# Patient Record
Sex: FEMALE | Race: WHITE | Employment: OTHER | URBAN - METROPOLITAN AREA
[De-identification: names, ages, dates, MRNs, and addresses within clinical notes are randomized per-mention and may not be internally consistent; named-entity substitution may affect disease eponyms.]

---

## 2017-02-15 RX ORDER — AMLODIPINE BESYLATE 5 MG/1
5 TABLET ORAL DAILY
Qty: 30 TABLET | Refills: 6 | Status: SHIPPED | OUTPATIENT
Start: 2017-02-15 | End: 2019-07-08

## 2017-03-14 ENCOUNTER — OFFICE VISIT (OUTPATIENT)
Dept: CARDIOLOGY CLINIC | Age: 77
End: 2017-03-14

## 2017-03-14 VITALS
BODY MASS INDEX: 40.3 KG/M2 | SYSTOLIC BLOOD PRESSURE: 134 MMHG | OXYGEN SATURATION: 96 % | DIASTOLIC BLOOD PRESSURE: 84 MMHG | HEART RATE: 73 BPM | HEIGHT: 62 IN | WEIGHT: 219 LBS

## 2017-03-14 DIAGNOSIS — E78.5 HYPERLIPIDEMIA, UNSPECIFIED HYPERLIPIDEMIA TYPE: ICD-10-CM

## 2017-03-14 DIAGNOSIS — I10 ESSENTIAL HYPERTENSION: ICD-10-CM

## 2017-03-14 PROCEDURE — 99214 OFFICE O/P EST MOD 30 MIN: CPT | Performed by: INTERNAL MEDICINE

## 2017-03-14 PROCEDURE — 3017F COLORECTAL CA SCREEN DOC REV: CPT | Performed by: INTERNAL MEDICINE

## 2017-03-14 PROCEDURE — 1036F TOBACCO NON-USER: CPT | Performed by: INTERNAL MEDICINE

## 2017-03-14 PROCEDURE — G8484 FLU IMMUNIZE NO ADMIN: HCPCS | Performed by: INTERNAL MEDICINE

## 2017-03-14 PROCEDURE — G8419 CALC BMI OUT NRM PARAM NOF/U: HCPCS | Performed by: INTERNAL MEDICINE

## 2017-03-14 PROCEDURE — 1123F ACP DISCUSS/DSCN MKR DOCD: CPT | Performed by: INTERNAL MEDICINE

## 2017-03-14 PROCEDURE — G8427 DOCREV CUR MEDS BY ELIG CLIN: HCPCS | Performed by: INTERNAL MEDICINE

## 2017-03-14 PROCEDURE — G8400 PT W/DXA NO RESULTS DOC: HCPCS | Performed by: INTERNAL MEDICINE

## 2017-03-14 PROCEDURE — 3014F SCREEN MAMMO DOC REV: CPT | Performed by: INTERNAL MEDICINE

## 2017-03-14 PROCEDURE — 1090F PRES/ABSN URINE INCON ASSESS: CPT | Performed by: INTERNAL MEDICINE

## 2017-03-14 PROCEDURE — 4040F PNEUMOC VAC/ADMIN/RCVD: CPT | Performed by: INTERNAL MEDICINE

## 2017-03-14 RX ORDER — ATORVASTATIN CALCIUM 10 MG/1
10 TABLET, FILM COATED ORAL DAILY
Qty: 30 TABLET | Refills: 6 | Status: SHIPPED | OUTPATIENT
Start: 2017-03-14 | End: 2019-11-20 | Stop reason: SDUPTHER

## 2017-04-03 ENCOUNTER — HOSPITAL ENCOUNTER (OUTPATIENT)
Dept: GENERAL RADIOLOGY | Age: 77
Discharge: HOME | End: 2017-04-03
Attending: INTERNAL MEDICINE | Admitting: INTERNAL MEDICINE

## 2017-04-03 LAB
CHOLESTEROL: 170 MG/DL
HDLC SERPL-MCNC: 69 MG/DL
LDL CHOLESTEROL CALCULATED: 76 MG/DL
TRIGL SERPL-MCNC: 127 MG/DL

## 2017-06-28 RX ORDER — METOPROLOL SUCCINATE 25 MG/1
25 TABLET, EXTENDED RELEASE ORAL DAILY
Qty: 90 TABLET | Refills: 3 | Status: SHIPPED | OUTPATIENT
Start: 2017-06-28 | End: 2019-07-08

## 2017-10-24 ENCOUNTER — OFFICE VISIT (OUTPATIENT)
Dept: CARDIOLOGY CLINIC | Age: 77
End: 2017-10-24

## 2017-10-24 VITALS
HEIGHT: 64 IN | BODY MASS INDEX: 36.95 KG/M2 | SYSTOLIC BLOOD PRESSURE: 152 MMHG | WEIGHT: 216.4 LBS | DIASTOLIC BLOOD PRESSURE: 96 MMHG | HEART RATE: 70 BPM

## 2017-10-24 DIAGNOSIS — Z82.49 FH: CAD (CORONARY ARTERY DISEASE): ICD-10-CM

## 2017-10-24 DIAGNOSIS — E78.49 OTHER HYPERLIPIDEMIA: ICD-10-CM

## 2017-10-24 DIAGNOSIS — I10 ESSENTIAL HYPERTENSION: Primary | ICD-10-CM

## 2017-10-24 PROCEDURE — 1090F PRES/ABSN URINE INCON ASSESS: CPT | Performed by: INTERNAL MEDICINE

## 2017-10-24 PROCEDURE — G8400 PT W/DXA NO RESULTS DOC: HCPCS | Performed by: INTERNAL MEDICINE

## 2017-10-24 PROCEDURE — G8484 FLU IMMUNIZE NO ADMIN: HCPCS | Performed by: INTERNAL MEDICINE

## 2017-10-24 PROCEDURE — 99213 OFFICE O/P EST LOW 20 MIN: CPT | Performed by: INTERNAL MEDICINE

## 2017-10-24 PROCEDURE — 1036F TOBACCO NON-USER: CPT | Performed by: INTERNAL MEDICINE

## 2017-10-24 PROCEDURE — 4040F PNEUMOC VAC/ADMIN/RCVD: CPT | Performed by: INTERNAL MEDICINE

## 2017-10-24 PROCEDURE — 1123F ACP DISCUSS/DSCN MKR DOCD: CPT | Performed by: INTERNAL MEDICINE

## 2017-10-24 PROCEDURE — 3017F COLORECTAL CA SCREEN DOC REV: CPT | Performed by: INTERNAL MEDICINE

## 2017-10-24 PROCEDURE — G8417 CALC BMI ABV UP PARAM F/U: HCPCS | Performed by: INTERNAL MEDICINE

## 2017-10-24 PROCEDURE — G8427 DOCREV CUR MEDS BY ELIG CLIN: HCPCS | Performed by: INTERNAL MEDICINE

## 2017-10-24 RX ORDER — HYDROCHLOROTHIAZIDE 25 MG/1
25 TABLET ORAL DAILY
Qty: 30 TABLET | Refills: 3 | Status: SHIPPED | OUTPATIENT
Start: 2017-10-24 | End: 2019-07-08

## 2017-10-24 NOTE — PROGRESS NOTES
Patient's medical history is documented as below. Patient confirms taking her medication as prescribed. Labs and recent testing results have been reviewed. She is here for 6 month follow-up  Patient has hypertension hyperlipidemia and strong family history of coronary artery disease. She has problems taking Norvasc and that has been discontinued. Her blood pressure is elevated today. Start the patient on hydrochlorothiazide 25 mg daily. Monitor blood pressure. Check labs in 2 weeks including complete metabolic panel and lipid panel. ROS    Constitutional:  Denies fever, chills, weight loss or weakness. HENT:  Denies sore throat or ear pain. Respiratory:  Denies cough or shortness of breath. Cardiovascular:  Denies chest pain, palpitations or swelling. GI:  Denies abdominal pain, nausea, vomiting, or diarrhea. Musculoskeletal:  Denies back pain. Skin:  Denies rash. Neurologic:  Denies headache, focal weakness or sensory changes. Endocrine:  Denies polyuria or polydipsia. Lymphatic:  Denies swollen glands. All other systems in a complete (14 organ system) ROS, except for pertinent positives and/or negatives as noted in the HPI (or elsewhere in my note), have been reviewed and are negative. PAST MEDICAL HISTORY    Past Medical History:   Diagnosis Date    Asthma     Bronchitis, asthmatic     Chest pain     Atypical    Fibromyalgia     H/O cardiac catheterization 08/02/1991    Patent coronary aterties with preserved left ventricular funciton.  H/O cardiovascular stress test     10/12 Normal EF70%,11/17/09 (Dominick) Normal pattern of perfusion in all regions. Post stress left venticle is normal in size. Normal Study. Normal perfusion in the distribution of all coronaries. Normal LV size and funciton. Rest EF is 70%. Global left ventricular systolic function is normal. Exercise capacity 7 METS.  Exercise capacity is normal. 6/98, 6/99, 8/07,     H/O complete electrocardiogram     9/1/05 NSR, occ. PVC. 3/14/05, 7/26/02, 7/2/97, 6/5/98, 3/16/98, 2/2/90    H/O echocardiogram     11/17/09 2D, M-mode, Doppler and color flow Doppler. Left ventricle is normal size. Normal left ventricular wall thickness. Left ventricular systolic function is normal.  EF =>55%. Transmitral spectral Doppler flow pattern is suggestive of impaired LV relaxation. Left ventricular wass motion is normal.  6/98, 5/19/05,    Hyperlipidemia     Hypertension     Macular degeneration        MEDICATIONS    Current Outpatient Rx   Medication Sig Dispense Refill    hydrochlorothiazide (HYDRODIURIL) 25 MG tablet Take 1 tablet by mouth daily 30 tablet 3    metoprolol succinate (TOPROL XL) 25 MG extended release tablet Take 1 tablet by mouth daily 90 tablet 3    atorvastatin (LIPITOR) 10 MG tablet Take 1 tablet by mouth daily Pt. Is taking every 3 days 30 tablet 6    ibuprofen (ADVIL;MOTRIN) 200 MG tablet Take 200 mg by mouth as needed for Pain      aspirin 81 MG tablet Take 81 mg by mouth as needed for Pain (Every other day)       Multiple Vitamins-Minerals (ICAPS) CAPS Take  by mouth.  Lutein 6 MG TABS Take  by mouth daily.  Calcium Carbonate-Vitamin D (CALTRATE 600+D PO) Take  by mouth daily.         amLODIPine (NORVASC) 5 MG tablet Take 1 tablet by mouth daily 30 tablet 6       ALLERGIES    Allergies   Allergen Reactions    Latex     Cozaar [Losartan] Swelling    Amlodipine Besylate Swelling     Muscle pains, swelling, cough    Codeine     Contrast [Iodides]     Iodine     Levaquin [Levofloxacin]     Losartan Potassium     Macrodantin [Nitrofurantoin Macrocrystal]     Nutritional Supplements     Penicillins     Wasp Venom     Zocor [Simvastatin - High Dose]        FAMILY HISTORY    Family History   Problem Relation Age of Onset    Cancer Mother     Coronary Art Dis Mother     Other Mother      CABG    Coronary Art Dis Father     Dementia Father     Other Father      CHF, CABG    Stroke Father     Cancer Brother        SURGICAL HISTORY    Past Surgical History:   Procedure Laterality Date    BLADDER REMOVAL      1973, 67    BREAST BIOPSY      1971, 72, 76, 76, 78 and 1997, benign    CARPAL TUNNEL RELEASE      Right    CATARACT REMOVAL  03/03/2017    Left Eye    FOOT SURGERY  8/09    Left    HYSTERECTOMY      KNEE SURGERY  12/10    Left    OTHER SURGICAL HISTORY      Bone Spurs 1990, 1993       PHYSICAL EXAM    Vital Signs:  BP (!) 152/96   Pulse 70   Ht 5' 4\" (1.626 m)   Wt 216 lb 6.4 oz (98.2 kg)   BMI 37.14 kg/m²   Constitutional:  Well developed, well nourished, no acute distress, non-toxic appearance. HENT:  Normocephalic, atraumatic, bilateral external ears normal, oropharynx moist, no oral exudates, Nose normal. Neck- normal range of motion, no tenderness, supple, no stridor. Eyes:  PERRL, EOMI, conjunctiva normal, no discharge. Respiratory:  Lungs are clear to auscultation  Cardiovascular:  Regular rhythm no murmur rub or gallop  GI:  Bowel sounds normal, Soft, no tenderness, no masses, no pulsatile masses. Integument:  Warm, dry, no erythema, no rash. Back:  No tenderness, no CVA tenderness. Musculoskeletal:  Intact distal pulses, no edema, no tenderness, no cyanosis, no clubbing. Good range of motion in all major joints. No tenderness to palpation or major deformities noted. Back- No tenderness. Neurologic:  Alert & oriented x 3, normal motor function, normal sensory function, no focal deficits noted. Psychiatric:  Affect normal, judgment normal, mood normal.     AssessmenT    1. Essential hypertension Not controlled   2. Other hyperlipidemia    3.  FH: CAD (coronary artery disease)        Plan  Start hydrochlorothiazide 25 mg daily  Monitor blood pressure  Check complete metabolic panel and lipid panel in 2 weeks  Office visit in 6 months or as needed    Electronically signed by: Natacha , 10/24/2017 10:39 AM

## 2017-11-15 ENCOUNTER — HOSPITAL ENCOUNTER (OUTPATIENT)
Dept: GENERAL RADIOLOGY | Age: 77
Discharge: HOME | End: 2017-11-15
Attending: INTERNAL MEDICINE | Admitting: INTERNAL MEDICINE

## 2017-11-15 LAB
ALBUMIN SERPL-MCNC: 4.1 GM/DL (ref 3.4–5)
ALP BLD-CCNC: 86 IU/L (ref 40–128)
ALT SERPL-CCNC: 20 U/L (ref 10–40)
ANION GAP SERPL CALCULATED.3IONS-SCNC: 10 MMOL/L (ref 4–16)
AST SERPL-CCNC: 17 IU/L (ref 15–37)
BILIRUB SERPL-MCNC: 0.4 MG/DL (ref 0–1)
BUN BLDV-MCNC: 20 MG/DL (ref 6–23)
CALCIUM SERPL-MCNC: 8.9 MG/DL (ref 8.3–10.6)
CHLORIDE BLD-SCNC: 103 MMOL/L (ref 99–110)
CHOLESTEROL: 171 MG/DL
CO2: 29 MMOL/L (ref 21–32)
CREAT SERPL-MCNC: 0.6 MG/DL (ref 0.6–1.1)
GFR AFRICAN AMERICAN: >60 ML/MIN/1.73M2
GFR NON-AFRICAN AMERICAN: >60 ML/MIN/1.73M2
GLUCOSE FASTING: 89 MG/DL (ref 70–99)
HCT VFR BLD CALC: 45.4 % (ref 37–47)
HDLC SERPL-MCNC: 66 MG/DL
HEMOGLOBIN: 14.6 GM/DL (ref 12.5–16)
LDL CHOLESTEROL DIRECT: 93 MG/DL
MCH RBC QN AUTO: 29.7 PG (ref 27–31)
MCHC RBC AUTO-ENTMCNC: 32.2 % (ref 32–36)
MCV RBC AUTO: 92.3 FL (ref 78–100)
PDW BLD-RTO: 13.6 % (ref 11.7–14.9)
PLATELET # BLD: 202 K/CU MM (ref 140–440)
PMV BLD AUTO: 12.7 FL (ref 7.5–11.1)
POTASSIUM SERPL-SCNC: 4.7 MMOL/L (ref 3.5–5.1)
RBC # BLD: 4.92 M/CU MM (ref 4.2–5.4)
SODIUM BLD-SCNC: 142 MMOL/L (ref 135–145)
TOTAL PROTEIN: 6.7 GM/DL (ref 6.4–8.2)
TRIGL SERPL-MCNC: 116 MG/DL
WBC # BLD: 7.1 K/CU MM (ref 4–10.5)

## 2017-12-18 ENCOUNTER — HOSPITAL ENCOUNTER (OUTPATIENT)
Dept: GENERAL RADIOLOGY | Age: 77
Discharge: HOME | End: 2017-12-18
Attending: OBSTETRICS & GYNECOLOGY | Admitting: OBSTETRICS & GYNECOLOGY

## 2017-12-19 LAB — CA 125: 16

## 2019-05-09 ENCOUNTER — HOSPITAL ENCOUNTER (OUTPATIENT)
Age: 79
Discharge: HOME | End: 2019-05-09
Payer: MEDICARE

## 2019-05-09 ENCOUNTER — HOSPITAL ENCOUNTER (OUTPATIENT)
Dept: GENERAL RADIOLOGY | Age: 79
Discharge: HOME | End: 2019-05-09
Payer: MEDICARE

## 2019-05-09 DIAGNOSIS — R05.9 COUGH: ICD-10-CM

## 2019-05-09 LAB
ALBUMIN SERPL-MCNC: 4.2 G/DL
ALP BLD-CCNC: 86 U/L
ALT SERPL-CCNC: 19 U/L
ANION GAP SERPL CALCULATED.3IONS-SCNC: NORMAL MMOL/L
AST SERPL-CCNC: 16 U/L
BASOPHILS ABSOLUTE: 0 /ΜL
BASOPHILS RELATIVE PERCENT: 0.3 %
BILIRUB SERPL-MCNC: 0.5 MG/DL (ref 0.1–1.4)
BUN BLDV-MCNC: 21 MG/DL
CALCIUM SERPL-MCNC: 9.9 MG/DL
CHLORIDE BLD-SCNC: 104 MMOL/L
CHOLESTEROL, TOTAL: 164 MG/DL
CHOLESTEROL/HDL RATIO: NORMAL
CO2: 27 MMOL/L
CREAT SERPL-MCNC: 0.8 MG/DL
EOSINOPHILS ABSOLUTE: 0.2 /ΜL
EOSINOPHILS RELATIVE PERCENT: 3.1 %
GFR CALCULATED: NORMAL
GLUCOSE BLD-MCNC: 108 MG/DL
HCT VFR BLD CALC: 47.1 % (ref 36–46)
HDLC SERPL-MCNC: 60 MG/DL (ref 35–70)
HEMOGLOBIN: 15.4 G/DL (ref 12–16)
LDL CHOLESTEROL CALCULATED: 84 MG/DL (ref 0–160)
LYMPHOCYTES ABSOLUTE: 1.2 /ΜL
LYMPHOCYTES RELATIVE PERCENT: 20.5 %
MCH RBC QN AUTO: 29.8 PG
MCHC RBC AUTO-ENTMCNC: 32.6 G/DL
MCV RBC AUTO: 91.4 FL
MONOCYTES ABSOLUTE: 0.5 /ΜL
MONOCYTES RELATIVE PERCENT: 9.1 %
NEUTROPHILS ABSOLUTE: 3.8 /ΜL
NEUTROPHILS RELATIVE PERCENT: 67 %
PLATELET # BLD: 191 K/ΜL
PMV BLD AUTO: ABNORMAL FL
POTASSIUM SERPL-SCNC: 5.1 MMOL/L
RBC # BLD: 5.15 10^6/ΜL
SODIUM BLD-SCNC: 143 MMOL/L
TOTAL PROTEIN: 6.6
TRIGL SERPL-MCNC: 100 MG/DL
TSH SERPL DL<=0.05 MIU/L-ACNC: 1.76 UIU/ML
VLDLC SERPL CALC-MCNC: 20 MG/DL
WBC # BLD: 5.7 10^3/ML

## 2019-05-09 PROCEDURE — 71046 X-RAY EXAM CHEST 2 VIEWS: CPT

## 2019-07-08 ENCOUNTER — OFFICE VISIT (OUTPATIENT)
Dept: CARDIOLOGY CLINIC | Age: 79
End: 2019-07-08
Payer: MEDICARE

## 2019-07-08 VITALS
DIASTOLIC BLOOD PRESSURE: 82 MMHG | HEART RATE: 88 BPM | WEIGHT: 218.8 LBS | BODY MASS INDEX: 37.36 KG/M2 | HEIGHT: 64 IN | SYSTOLIC BLOOD PRESSURE: 138 MMHG

## 2019-07-08 DIAGNOSIS — R06.02 SOB (SHORTNESS OF BREATH): Primary | ICD-10-CM

## 2019-07-08 PROCEDURE — 1036F TOBACCO NON-USER: CPT | Performed by: INTERNAL MEDICINE

## 2019-07-08 PROCEDURE — G8419 CALC BMI OUT NRM PARAM NOF/U: HCPCS | Performed by: INTERNAL MEDICINE

## 2019-07-08 PROCEDURE — 1123F ACP DISCUSS/DSCN MKR DOCD: CPT | Performed by: INTERNAL MEDICINE

## 2019-07-08 PROCEDURE — 99214 OFFICE O/P EST MOD 30 MIN: CPT | Performed by: INTERNAL MEDICINE

## 2019-07-08 PROCEDURE — 93000 ELECTROCARDIOGRAM COMPLETE: CPT | Performed by: INTERNAL MEDICINE

## 2019-07-08 PROCEDURE — G8400 PT W/DXA NO RESULTS DOC: HCPCS | Performed by: INTERNAL MEDICINE

## 2019-07-08 PROCEDURE — 1090F PRES/ABSN URINE INCON ASSESS: CPT | Performed by: INTERNAL MEDICINE

## 2019-07-08 PROCEDURE — G8427 DOCREV CUR MEDS BY ELIG CLIN: HCPCS | Performed by: INTERNAL MEDICINE

## 2019-07-08 PROCEDURE — 4040F PNEUMOC VAC/ADMIN/RCVD: CPT | Performed by: INTERNAL MEDICINE

## 2019-07-08 RX ORDER — TRIAMTERENE AND HYDROCHLOROTHIAZIDE 37.5; 25 MG/1; MG/1
1 TABLET ORAL DAILY
COMMUNITY
End: 2021-03-29

## 2019-07-08 RX ORDER — NEBIVOLOL 5 MG/1
5 TABLET ORAL 2 TIMES DAILY
COMMUNITY
End: 2019-07-08

## 2019-07-08 RX ORDER — LABETALOL 100 MG/1
100 TABLET, FILM COATED ORAL 2 TIMES DAILY
Qty: 60 TABLET | Refills: 3 | Status: SHIPPED | OUTPATIENT
Start: 2019-07-08 | End: 2019-09-09 | Stop reason: SDUPTHER

## 2019-07-08 NOTE — PROGRESS NOTES
Ursula Benjamin MD        OFFICE  FOLLOWUP NOTE    Chief complaints: patient is here for management of shortness of breath, HTN, DYSLPIDEMIA    Subjective: + shortness of breath, no dizziness, no palpitations    HPI Ralph Garcia is a 70 y. o.year old who  has a past medical history of Asthma, Bronchitis, asthmatic, Chest pain, Fibromyalgia, H/O cardiac catheterization, H/O cardiovascular stress test, H/O complete electrocardiogram, H/O echocardiogram, Hyperlipidemia, Hypertension, and Macular degeneration. and presents for management of shortness of breath, HTN, DYSLPIDEMIA which are well controlled  She has condo on [address] and climbing upto 3rd floor makes her shortness of breath, she thinks her blood pressure is labile also, her home blood pressure ranges from 117-149/77-89    Current Outpatient Medications   Medication Sig Dispense Refill    nebivolol (BYSTOLIC) 5 MG tablet Take 5 mg by mouth 2 times daily      triamterene-hydrochlorothiazide (MAXZIDE-25) 37.5-25 MG per tablet Take 1 tablet by mouth daily Indications: 1 every 3 days      atorvastatin (LIPITOR) 10 MG tablet Take 1 tablet by mouth daily Pt. Is taking every 3 days 30 tablet 6    ibuprofen (ADVIL;MOTRIN) 200 MG tablet Take 200 mg by mouth as needed for Pain      Multiple Vitamins-Minerals (ICAPS) CAPS Take  by mouth.  Calcium Carbonate-Vitamin D (CALTRATE 600+D PO) Take  by mouth daily. No current facility-administered medications for this visit. Allergies: Latex; Cozaar [losartan]; Amlodipine besylate; Codeine; Contrast [iodides]; Iodine; Levaquin [levofloxacin]; Losartan potassium; Macrodantin [nitrofurantoin macrocrystal]; Nutritional supplements; Penicillins;  Wasp venom; and Zocor [simvastatin - high dose]  Past Medical History:   Diagnosis Date    Asthma     Bronchitis, asthmatic     Chest pain     Atypical    Fibromyalgia     H/O cardiac catheterization 08/02/1991    Patent coronary aterties with preserved left ventricular funciton.  H/O cardiovascular stress test     10/12 Normal EF70%,11/17/09 (Dominick) Normal pattern of perfusion in all regions. Post stress left venticle is normal in size. Normal Study. Normal perfusion in the distribution of all coronaries. Normal LV size and funciton. Rest EF is 70%. Global left ventricular systolic function is normal. Exercise capacity 7 METS. Exercise capacity is normal. 6/98, 6/99, 8/07,     H/O complete electrocardiogram     9/1/05 NSR, occ. PVC. 3/14/05, 7/26/02, 7/2/97, 6/5/98, 3/16/98, 2/2/90    H/O echocardiogram     11/17/09 2D, M-mode, Doppler and color flow Doppler. Left ventricle is normal size. Normal left ventricular wall thickness. Left ventricular systolic function is normal.  EF =>55%. Transmitral spectral Doppler flow pattern is suggestive of impaired LV relaxation. Left ventricular wass motion is normal.  6/98, 5/19/05,    Hyperlipidemia     Hypertension     Macular degeneration      Past Surgical History:   Procedure Laterality Date    BLADDER REMOVAL      1973, 1974    BREAST BIOPSY      1971, 72, 76, 76, 78 and 1997, benign    CARPAL TUNNEL RELEASE      Right    CATARACT REMOVAL  03/03/2017    Left Eye    FOOT SURGERY  8/09    Left    HYSTERECTOMY      KNEE SURGERY  12/10    Left    OTHER SURGICAL HISTORY      Bone Spurs 1990, 1993     Family History   Problem Relation Age of Onset    Cancer Mother     Coronary Art Dis Mother     Other Mother         CABG    Coronary Art Dis Father     Dementia Father     Other Father         CHF, CABG    Stroke Father     Cancer Brother      Social History     Tobacco Use    Smoking status: Never Smoker    Smokeless tobacco: Never Used   Substance Use Topics    Alcohol use:  Yes     Alcohol/week: 0.6 oz     Types: 1 Glasses of wine per week     Comment: Rarely alcohol, 1 decaf coffee daily      [unfilled]  Review of Systems:   · Constitutional: No Fever or Weight Loss   · Eyes: No Decreased Vision  · ENT: No Headaches, Hearing Loss or Vertigo  · Cardiovascular: No chest pain,+ dyspnea on exertion, palpitations or loss of consciousness  · Respiratory: No cough or wheezing    · Gastrointestinal: No abdominal pain, appetite loss, blood in stools, constipation, diarrhea or heartburn  · Genitourinary: No dysuria, trouble voiding, or hematuria  · Musculoskeletal:  No gait disturbance, weakness or joint complaints  · Integumentary: No rash or pruritis  · Neurological: No TIA or stroke symptoms  · Psychiatric: No anxiety or depression  · Endocrine: No malaise, fatigue or temperature intolerance  · Hematologic/Lymphatic: No bleeding problems, blood clots or swollen lymph nodes  · Allergic/Immunologic: No nasal congestion or hives  All systems negative except as marked. Objective:  /82   Pulse 88   Ht 5' 4\" (1.626 m)   Wt 218 lb 12.8 oz (99.2 kg)   BMI 37.56 kg/m²   Wt Readings from Last 3 Encounters:   07/08/19 218 lb 12.8 oz (99.2 kg)   10/24/17 216 lb 6.4 oz (98.2 kg)   05/09/17 217 lb (98.4 kg)     Body mass index is 37.56 kg/m². GENERAL - Alert, oriented, pleasant, in no apparent distress,normal grooming  HEENT - pupils are reactive to light and accomodation, cornea intact, conjunctive normal color, ears are normal in exam,throat exam in normal, teeth, gum and palate are normal, oral mucosa is normal without any notation of pallor or cyanosis  Neck - Supple. No jugular venous distention noted. No carotid bruits, no apical -carotid delay  Respiratory:  Normal breath sounds, No respiratory distress, No wheezing, No chest tenderness. ,no use of accessory muscles, diaphragm movement is normal  Cardiovascular: (PMI) apex non displaced,no lifts no thrills, no s3,no s4, Normal heart rate, Normal rhythm, No murmurs, No rubs, No gallops.  Carotid arteries pulse and amplitude are normal no bruit, no abdominal bruit noted ( normal abdominal aorta ausculation), femoral arteries pulse and amplitude are

## 2019-07-11 ENCOUNTER — PROCEDURE VISIT (OUTPATIENT)
Dept: CARDIOLOGY CLINIC | Age: 79
End: 2019-07-11
Payer: MEDICARE

## 2019-07-11 DIAGNOSIS — M79.89 LEG SWELLING: Primary | ICD-10-CM

## 2019-07-11 DIAGNOSIS — R06.02 SOB (SHORTNESS OF BREATH): ICD-10-CM

## 2019-07-11 DIAGNOSIS — R06.02 SOB (SHORTNESS OF BREATH): Primary | ICD-10-CM

## 2019-07-11 LAB
LV EF: 48 %
LV EF: 72 %
LVEF MODALITY: NORMAL
LVEF MODALITY: NORMAL

## 2019-07-11 PROCEDURE — 93018 CV STRESS TEST I&R ONLY: CPT | Performed by: INTERNAL MEDICINE

## 2019-07-11 PROCEDURE — 93017 CV STRESS TEST TRACING ONLY: CPT | Performed by: INTERNAL MEDICINE

## 2019-07-11 PROCEDURE — 93016 CV STRESS TEST SUPVJ ONLY: CPT | Performed by: INTERNAL MEDICINE

## 2019-07-11 PROCEDURE — 78452 HT MUSCLE IMAGE SPECT MULT: CPT | Performed by: INTERNAL MEDICINE

## 2019-07-11 PROCEDURE — 93970 EXTREMITY STUDY: CPT | Performed by: INTERNAL MEDICINE

## 2019-07-11 PROCEDURE — 93306 TTE W/DOPPLER COMPLETE: CPT | Performed by: INTERNAL MEDICINE

## 2019-07-11 PROCEDURE — A9500 TC99M SESTAMIBI: HCPCS | Performed by: INTERNAL MEDICINE

## 2019-07-15 ENCOUNTER — TELEPHONE (OUTPATIENT)
Dept: CARDIOLOGY CLINIC | Age: 79
End: 2019-07-15

## 2019-07-15 NOTE — TELEPHONE ENCOUNTER
Notified pt of testing , she said Dr. Lowell Gibbons already told her to go to [facility] Pharmacy for Compression socks. Summary        No evidence of DVT or SVT in the bilateral common femoral vein, femoral    vein, popliteal vein, greater saphenous vein or small saphenous vein.    Significant reflux noted of the Right CFV.    No significant reflux noted in the veins of the left lower extremity.        Recommendations        compression socks and leg elevation                  Summary   Left ventricular systolic function is mildly depressed with an ejection   fraction of 45-50%.    Mild septal wall asymmetrical left ventricular hypertrophy.   The left atrium is mildly dilated.   Sclerotic, but non-stenotic aortic valve.   Doppler evaluation reveals moderate aortic and mitral regurgitation and mild   tricuspid regurgitation.   Right ventricular systolic pressure of 43 mmHg consistent with mild   pulmonary hypertension.   No evidence of pericardial effusion.       Summary    Supervising physician Dr. Mercy Mo .    Normal LV function.   Petr Huh is normal isotope uptake following exercise and at rest. There is no    evidence of exercise induced ischemia.    This is a normal study

## 2019-08-19 ENCOUNTER — OFFICE VISIT (OUTPATIENT)
Dept: CARDIOLOGY CLINIC | Age: 79
End: 2019-08-19
Payer: MEDICARE

## 2019-08-19 VITALS
SYSTOLIC BLOOD PRESSURE: 130 MMHG | BODY MASS INDEX: 37.36 KG/M2 | HEART RATE: 76 BPM | DIASTOLIC BLOOD PRESSURE: 82 MMHG | WEIGHT: 218.8 LBS | HEIGHT: 64 IN

## 2019-08-19 DIAGNOSIS — M79.89 LEG SWELLING: Primary | ICD-10-CM

## 2019-08-19 PROCEDURE — G8427 DOCREV CUR MEDS BY ELIG CLIN: HCPCS | Performed by: INTERNAL MEDICINE

## 2019-08-19 PROCEDURE — G8417 CALC BMI ABV UP PARAM F/U: HCPCS | Performed by: INTERNAL MEDICINE

## 2019-08-19 PROCEDURE — 1036F TOBACCO NON-USER: CPT | Performed by: INTERNAL MEDICINE

## 2019-08-19 PROCEDURE — 1090F PRES/ABSN URINE INCON ASSESS: CPT | Performed by: INTERNAL MEDICINE

## 2019-08-19 PROCEDURE — 4040F PNEUMOC VAC/ADMIN/RCVD: CPT | Performed by: INTERNAL MEDICINE

## 2019-08-19 PROCEDURE — G8400 PT W/DXA NO RESULTS DOC: HCPCS | Performed by: INTERNAL MEDICINE

## 2019-08-19 PROCEDURE — 1123F ACP DISCUSS/DSCN MKR DOCD: CPT | Performed by: INTERNAL MEDICINE

## 2019-08-19 PROCEDURE — 99214 OFFICE O/P EST MOD 30 MIN: CPT | Performed by: INTERNAL MEDICINE

## 2019-08-19 NOTE — PROGRESS NOTES
respiratory distress, No wheezing, No chest tenderness. ,no use of accessory muscles, diaphragm movement is normal  Cardiovascular: (PMI) apex non displaced,no lifts no thrills, no s3,no s4, Normal heart rate, Normal rhythm, No murmurs, No rubs, No gallops. Carotid arteries pulse and amplitude are normal no bruit, no abdominal bruit noted ( normal abdominal aorta ausculation), femoral arteries pulse and amplitude are normal no bruit, pedal pulses are normal  Femoral pulses have normal amplitude, no bruits   Extremities + significant edema, no varicose veins    Abdomen - No masses, tenderness, or organomegaly, no hepato-splenomegally, no bruits  Musculoskeletal - No significant edema, no kyphosis or scoliosis, no deformity in any extremity noted, muscle strength and tone are normal  Skin: no ulcer,no scar,no stasis dermatitis   Neurologic - alert oriented times 3,Cranial nerves II through XII are grossly intact. There were no gross focal neurologic abnormalities. All sensory and motor nerves examined and were normal  Psychiatric: normal mood and affect    No results found for: CKTOTAL, CKMB, CKMBINDEX, TROPONINI  BNP:  No results found for: BNP  PT/INR:  No results found for: PTINR  No results found for: LABA1C  Lab Results   Component Value Date    CHOL 171 11/15/2017    TRIG 116 11/15/2017    HDL 66 11/15/2017    LDLCALC 76 04/03/2017    LDLDIRECT 93 11/15/2017     Lab Results   Component Value Date    ALT 20 11/15/2017    AST 17 11/15/2017     TSH:  No results found for: TSH    Impression:  Abad Kaye is a 70 y. o.year old who  has a past medical history of Asthma, Bronchitis, asthmatic, Chest pain, Fibromyalgia, H/O cardiac catheterization, H/O cardiovascular stress test, H/O cardiovascular stress test, H/O complete electrocardiogram, H/O Doppler lower venous ultrasound, H/O echocardiogram, H/O echocardiogram, Hyperlipidemia, Hypertension, and Macular degeneration. and presents with     Plan:  1.  Shortness of breath:resolved since her blood pressure is controlled, on labetalol, has nicole stress test and echo   2. HTN: stable, home blood pressure reading noted, continue  labetalol   3. Leg swelling\" venous doppler showed severe rt CFV reflux, will recommend CT abdomen pelvis to check if there is any venous compression in the pelvis  1. Dyslipidemia:continue statins  2. Health maintenance: exerise and diet  All labs, medications and tests reviewed, continue all other medications of all above medical condition listed as is.     [unfilled]

## 2019-09-10 RX ORDER — LABETALOL 100 MG/1
100 TABLET, FILM COATED ORAL 2 TIMES DAILY
Qty: 180 TABLET | Refills: 3 | Status: SHIPPED | OUTPATIENT
Start: 2019-09-10 | End: 2020-05-22 | Stop reason: SDUPTHER

## 2019-11-20 ENCOUNTER — OFFICE VISIT (OUTPATIENT)
Dept: CARDIOLOGY CLINIC | Age: 79
End: 2019-11-20
Payer: MEDICARE

## 2019-11-20 VITALS
SYSTOLIC BLOOD PRESSURE: 136 MMHG | DIASTOLIC BLOOD PRESSURE: 84 MMHG | HEIGHT: 64 IN | WEIGHT: 216 LBS | HEART RATE: 82 BPM | BODY MASS INDEX: 36.88 KG/M2

## 2019-11-20 DIAGNOSIS — E78.2 MIXED HYPERLIPIDEMIA: Primary | ICD-10-CM

## 2019-11-20 PROCEDURE — G8417 CALC BMI ABV UP PARAM F/U: HCPCS | Performed by: INTERNAL MEDICINE

## 2019-11-20 PROCEDURE — G8400 PT W/DXA NO RESULTS DOC: HCPCS | Performed by: INTERNAL MEDICINE

## 2019-11-20 PROCEDURE — G8484 FLU IMMUNIZE NO ADMIN: HCPCS | Performed by: INTERNAL MEDICINE

## 2019-11-20 PROCEDURE — 99214 OFFICE O/P EST MOD 30 MIN: CPT | Performed by: INTERNAL MEDICINE

## 2019-11-20 PROCEDURE — 1123F ACP DISCUSS/DSCN MKR DOCD: CPT | Performed by: INTERNAL MEDICINE

## 2019-11-20 PROCEDURE — 1036F TOBACCO NON-USER: CPT | Performed by: INTERNAL MEDICINE

## 2019-11-20 PROCEDURE — 4040F PNEUMOC VAC/ADMIN/RCVD: CPT | Performed by: INTERNAL MEDICINE

## 2019-11-20 PROCEDURE — G8427 DOCREV CUR MEDS BY ELIG CLIN: HCPCS | Performed by: INTERNAL MEDICINE

## 2019-11-20 PROCEDURE — 1090F PRES/ABSN URINE INCON ASSESS: CPT | Performed by: INTERNAL MEDICINE

## 2019-11-20 RX ORDER — ATORVASTATIN CALCIUM 10 MG/1
10 TABLET, FILM COATED ORAL DAILY
Qty: 90 TABLET | Refills: 1 | Status: SHIPPED | OUTPATIENT
Start: 2019-11-20 | End: 2020-05-22 | Stop reason: SDUPTHER

## 2020-01-31 LAB
ALBUMIN SERPL-MCNC: NORMAL G/DL
ALP BLD-CCNC: 84 U/L
ALT SERPL-CCNC: 31 U/L
ANION GAP SERPL CALCULATED.3IONS-SCNC: 7 MMOL/L
AST SERPL-CCNC: 14 U/L
BASOPHILS ABSOLUTE: ABNORMAL
BASOPHILS RELATIVE PERCENT: 1.2 %
BILIRUB SERPL-MCNC: 0.4 MG/DL (ref 0.1–1.4)
BUN BLDV-MCNC: 21 MG/DL
CALCIUM SERPL-MCNC: 9.2 MG/DL
CHLORIDE BLD-SCNC: 107 MMOL/L
CO2: 27 MMOL/L
CREAT SERPL-MCNC: 0.83 MG/DL
EOSINOPHILS ABSOLUTE: ABNORMAL
EOSINOPHILS RELATIVE PERCENT: 1.6 %
GFR CALCULATED: 60
GLUCOSE BLD-MCNC: 84 MG/DL
HCT VFR BLD CALC: 47.4 % (ref 36–46)
HEMOGLOBIN: 15.6 G/DL (ref 12–16)
INR BLD: 0.91
LYMPHOCYTES ABSOLUTE: ABNORMAL
LYMPHOCYTES RELATIVE PERCENT: 21.4 %
MCH RBC QN AUTO: 30.2 PG
MCHC RBC AUTO-ENTMCNC: 33 G/DL
MCV RBC AUTO: 91.5 FL
MONOCYTES ABSOLUTE: ABNORMAL
MONOCYTES RELATIVE PERCENT: 8.6 %
NEUTROPHILS ABSOLUTE: ABNORMAL
NEUTROPHILS RELATIVE PERCENT: 67.2 %
PLATELET # BLD: 200 K/ΜL
PMV BLD AUTO: ABNORMAL FL
POTASSIUM SERPL-SCNC: 4.5 MMOL/L
PROTIME: 10.6 SECONDS
RBC # BLD: 5.17 10^6/ΜL
SODIUM BLD-SCNC: 141 MMOL/L
T4 FREE: 1.19
TOTAL PROTEIN: NORMAL
TROPONIN: 0.01
TSH SERPL DL<=0.05 MIU/L-ACNC: 1.88 UIU/ML
WBC # BLD: 7.3 10^3/ML

## 2020-02-11 ENCOUNTER — OFFICE VISIT (OUTPATIENT)
Dept: CARDIOLOGY CLINIC | Age: 80
End: 2020-02-11
Payer: MEDICARE

## 2020-02-11 ENCOUNTER — HOSPITAL ENCOUNTER (OUTPATIENT)
Age: 80
Discharge: HOME | End: 2020-02-11
Payer: MEDICARE

## 2020-02-11 VITALS
DIASTOLIC BLOOD PRESSURE: 88 MMHG | HEIGHT: 64 IN | HEART RATE: 84 BPM | SYSTOLIC BLOOD PRESSURE: 138 MMHG | WEIGHT: 211.4 LBS | BODY MASS INDEX: 36.09 KG/M2

## 2020-02-11 PROBLEM — I48.91 A-FIB (HCC): Status: ACTIVE | Noted: 2020-02-11

## 2020-02-11 LAB — TSH HIGH SENSITIVITY: 2.39 UIU/ML (ref 0.27–4.2)

## 2020-02-11 PROCEDURE — 1090F PRES/ABSN URINE INCON ASSESS: CPT | Performed by: INTERNAL MEDICINE

## 2020-02-11 PROCEDURE — 36415 COLL VENOUS BLD VENIPUNCTURE: CPT

## 2020-02-11 PROCEDURE — 1036F TOBACCO NON-USER: CPT | Performed by: INTERNAL MEDICINE

## 2020-02-11 PROCEDURE — 4040F PNEUMOC VAC/ADMIN/RCVD: CPT | Performed by: INTERNAL MEDICINE

## 2020-02-11 PROCEDURE — G8427 DOCREV CUR MEDS BY ELIG CLIN: HCPCS | Performed by: INTERNAL MEDICINE

## 2020-02-11 PROCEDURE — 1123F ACP DISCUSS/DSCN MKR DOCD: CPT | Performed by: INTERNAL MEDICINE

## 2020-02-11 PROCEDURE — G8400 PT W/DXA NO RESULTS DOC: HCPCS | Performed by: INTERNAL MEDICINE

## 2020-02-11 PROCEDURE — 93000 ELECTROCARDIOGRAM COMPLETE: CPT | Performed by: INTERNAL MEDICINE

## 2020-02-11 PROCEDURE — 84443 ASSAY THYROID STIM HORMONE: CPT

## 2020-02-11 PROCEDURE — G8417 CALC BMI ABV UP PARAM F/U: HCPCS | Performed by: INTERNAL MEDICINE

## 2020-02-11 PROCEDURE — 99214 OFFICE O/P EST MOD 30 MIN: CPT | Performed by: INTERNAL MEDICINE

## 2020-02-11 PROCEDURE — G8484 FLU IMMUNIZE NO ADMIN: HCPCS | Performed by: INTERNAL MEDICINE

## 2020-02-11 RX ORDER — ASPIRIN 325 MG
325 TABLET, DELAYED RELEASE (ENTERIC COATED) ORAL EVERY 6 HOURS PRN
Qty: 30 TABLET | Refills: 3 | Status: SHIPPED | OUTPATIENT
Start: 2020-02-11 | End: 2020-02-27

## 2020-02-11 NOTE — PROGRESS NOTES
Metoprolol; Nutritional supplements; Penicillins; Wasp venom; and Zocor [simvastatin - high dose]    Patient History:  Past Medical History:   Diagnosis Date    Asthma     Bronchitis, asthmatic     Chest pain     Atypical    Fibromyalgia     H/O cardiac catheterization 08/02/1991    Patent coronary aterties with preserved left ventricular funciton.  H/O cardiovascular stress test     10/12 Normal EF70%,11/17/09 (Dominick) Normal pattern of perfusion in all regions. Post stress left venticle is normal in size. Normal Study. Normal perfusion in the distribution of all coronaries. Normal LV size and funciton. Rest EF is 70%. Global left ventricular systolic function is normal. Exercise capacity 7 METS. Exercise capacity is normal. 6/98, 6/99, 8/07,     H/O cardiovascular stress test 07/11/2019    Normal study.  H/O complete electrocardiogram     9/1/05 NSR, occ. PVC. 3/14/05, 7/26/02, 7/2/97, 6/5/98, 3/16/98, 2/2/90    H/O Doppler lower venous ultrasound 07/11/2019     Significant reflux noted of the Right CFV. No DVT.  H/O echocardiogram     11/17/09 2D, M-mode, Doppler and color flow Doppler. Left ventricle is normal size. Normal left ventricular wall thickness. Left ventricular systolic function is normal.  EF =>55%. Transmitral spectral Doppler flow pattern is suggestive of impaired LV relaxation. Left ventricular wass motion is normal.  6/98, 5/19/05,    H/O echocardiogram 07/11/2019    EF 45-50%, Mild AR, TR, MR & PHTN.     Hyperlipidemia     Hypertension     Macular degeneration      Past Surgical History:   Procedure Laterality Date    BLADDER REMOVAL      1973, 1974    BREAST BIOPSY      1971, 72, 76, 76, 78 and 1997, benign    CARPAL TUNNEL RELEASE      Right    CATARACT REMOVAL  03/03/2017    Left Eye    FOOT SURGERY  8/09    Left    HYSTERECTOMY      KNEE SURGERY  12/10    Left    OTHER SURGICAL HISTORY      Bone Spurs 1990, 1993     Family History   Problem Relation Age of Onset    Conjunctiva normal, No discharge. Respiratory:  Normal breath sounds, No respiratory distress, No wheezing, No chest tenderness. ,no use of accessory muscles,   Cardiovascular: (PMI) apex non displaced,no lifts no thrills,S1 and S2 audible, No added heart sounds, No signs of ankle edema, or volume overload, No evidence of JVD  GI:  Bowel sounds normal, Soft, No tenderness, No masses, No gross visceromegaly   :  No costovertebral angle tenderness   Musculoskeletal:  No edema, no tenderness, no deformities. Back- no tenderness  Integument:  Well hydrated, no rash   Lymphatic:  No lymphadenopathy noted   Neurologic:  Alert & oriented x 3, CN 2-12 normal, normal motor function, normal sensory function, no focal deficits noted   Psychiatric:  Speech and behavior appropriate       Medical decision making and Data review:  DATA:    Lab Results   Component Value Date    CHOL 164 05/09/2019    TRIG 100 05/09/2019    HDL 60 05/09/2019    LDLCALC 84 05/09/2019    LDLDIRECT 93 11/15/2017     Lab Results   Component Value Date    ALT 19 05/09/2019    AST 16 05/09/2019     Stress test  7/11/19  Summary    Supervising physician Dr. Rajani Beck .    Normal LV function.   Kristin Beto is normal isotope uptake following exercise and at rest. There is no    evidence of exercise induced ischemia.    This is a normal study.         Echo 7/11/19  Summary   Left ventricular systolic function is mildly depressed with an ejection   fraction of 45-50%. Mild septal wall asymmetrical left ventricular hypertrophy. The left atrium is mildly dilated. Sclerotic, but non-stenotic aortic valve. Doppler evaluation reveals moderate aortic and mitral regurgitation and mild   tricuspid regurgitation. Right ventricular systolic pressure of 43 mmHg consistent with mild   pulmonary hypertension. No evidence of pericardial effusion.              All labs, medications and tests reviewed by myself including data and history from outside source , patient and available family . Assessment & Plan:    1. Atrial fibrillation, unspecified type ([address]    2. FH: CAD (coronary artery disease)    3. Essential hypertension    4. Other hyperlipidemia    5. Paroxysmal atrial fibrillation (HCC)         No problem-specific Assessment & Plan notes found for this encounter. Atrial fibrillation:  He is back in sinus rhythm today she is intolerant of multiple medications she has 15 allergy lists for now we will hold off on anticoagulation but she should be evaluated by gastroenterology due to concerns for GI bleeding she may have hemorrhoids though  Will refer to gastroenterology. Will hold off on adding antiarrhythmics right now she should be on aspirin full dose 33 to 5 mg daily once we are sure that she does not have GI bleeding we can consider anticoagulation or even refer her for watchman's device    HTN (hypertension)  Blood pressure very well controlled at home. At home it is 135 / 60's . Tolerating meds well. She has noted some left leg swelling after she goes to [employer]. No swelling now. She is not short of breath . EF is 40 to 45% stress showed no infarct or ischemia     Hyperlipidemia  She takes lipitor every three days     Dyslipidemia :  Lorena Meléndez had lab work recently,  Lipid profile was reviewed with patient. Counseled extensively and medication compliance urged. We discussed that for the  prevention of ASCVD our  goal is aggressive risk modification. Patient is encouraged to exercise even a brisk walk for 30 minutes  at least 3 to 4 times a week   Various goals were discussed and questions answered. Continue current medications. Appropriate prescriptions are addressed and refills ordered. Questions answered and patient verbalizes understanding. Call for any problems, questions, or concerns. Continue all other medications of all above medical condition listed as is. Return in about 3 months (around 5/11/2020).

## 2020-02-11 NOTE — LETTER
CLINICAL STAFF DOCUMENTATION    Jefjackie Preston  1940  [id number]    Have you had any Chest Pain - No    Have you had any Shortness of Breath - Yes on exertion     Have you had any dizziness - No    Have you had any palpitations - No    Do you have any edema -   Check Venous \"LEG PROBLEM Questionnaire\"    Do you have a surgery or procedure scheduled in the near future - No  If Yes- DO EKG  BE SURE TO GIVE THIS INFORMATION to [facility]    Ask patient if they [facility] if [facility] are not already [facility]    Check to see if we have an E-MAIL on file for the patient    Check medication list thoroughly!!!  BE SURE TO ASK PATIENT IF THEY NEED MEDICATION REFILLS

## 2020-02-27 ENCOUNTER — NURSE ONLY (OUTPATIENT)
Dept: CARDIOLOGY CLINIC | Age: 80
End: 2020-02-27
Payer: MEDICARE

## 2020-02-27 ENCOUNTER — NURSE ONLY (OUTPATIENT)
Dept: CARDIOLOGY CLINIC | Age: 80
End: 2020-02-27

## 2020-02-27 ENCOUNTER — TELEPHONE (OUTPATIENT)
Dept: CARDIOLOGY CLINIC | Age: 80
End: 2020-02-27

## 2020-02-27 VITALS
WEIGHT: 214.8 LBS | HEIGHT: 64 IN | BODY MASS INDEX: 36.67 KG/M2 | HEART RATE: 103 BPM | SYSTOLIC BLOOD PRESSURE: 124 MMHG | DIASTOLIC BLOOD PRESSURE: 86 MMHG

## 2020-02-27 PROCEDURE — 93225 XTRNL ECG REC<48 HRS REC: CPT | Performed by: INTERNAL MEDICINE

## 2020-02-27 NOTE — PROGRESS NOTES
48 hour holter monitor applied 2/27/2020 @ 12:30pm  for Atrial Fibrillation & Tachycardia Serial # [id number] . Instructed patient on monitor and proper use. Instructed on diary. When to remove and bring it back. Must leave the holter monitor on  without removing for the duration of time ordered. Answered all questions the patient had. Instructed patient to call [facility] at [phone] with any questions or concerns with the monitor.

## 2020-02-27 NOTE — PROGRESS NOTES
Per katie NP Pt is here for a EKG only for Afib. Katie would like to put pt on 48 hour Holter monitor to see what to do in near future depending on results. Follow up with Merle in a week. Pt is understanding 7 verbalizes.

## 2020-02-27 NOTE — LETTER
CLINICAL STAFF DOCUMENTATION    [facility]  1940  [id number]    Have you had any Chest Pain - No    Have you had any Shortness of Breath - No    Have you had any dizziness - No    Have you had any palpitations - No    Do you have any edema - No    Check Venous \"LEG PROBLEM Questionnaire\"    Do you have a surgery or procedure scheduled in the near future - No  If Yes- DO EKG  If Yes - Who is the surgery with?    Phone number of physician   Fax number of physician   Type of surgery   BE SURE TO GIVE THIS INFORMATION to Diana Maldonado    Ask patient if they want to sign up for MyChart if they are not already signed up    Check to see if we have an E-MAIL on file for the patient    Check medication list thoroughly!!!  BE SURE TO ASK PATIENT IF THEY NEED MEDICATION REFILLS

## 2020-02-27 NOTE — PROGRESS NOTES
[id number] Score for Atrial Fibrillation Stroke Risk   Risk   Factors  Component Value   C CHF No 0   H HTN Yes 1   A2 Age >= 76 Yes,  (81 y.o.) 2   D DM No 0   S2 Prior Stroke/TIA No 0   V Vascular Disease No 0   A Age 74-69 No,  (81 y.o.) 0   Sc Sex female 1    [id number]  Score  4   Score last updated 9/89/49 56:34 AM    Click here for a link to the UpToDate guideline \"Atrial Fibrillation: Anticoagulation therapy to prevent embolization    Disclaimer: Risk Score calculation is dependent on accuracy of patient problem list and past encounter diagnosis.

## 2020-02-27 NOTE — TELEPHONE ENCOUNTER
Dr Barbara Loera office called patient was   In there office with afib per [facility] nurse visit for ekg

## 2020-03-05 ENCOUNTER — OFFICE VISIT (OUTPATIENT)
Dept: CARDIOLOGY CLINIC | Age: 80
End: 2020-03-05
Payer: MEDICARE

## 2020-03-05 VITALS
WEIGHT: 214.8 LBS | HEIGHT: 64 IN | BODY MASS INDEX: 36.67 KG/M2 | HEART RATE: 60 BPM | DIASTOLIC BLOOD PRESSURE: 80 MMHG | SYSTOLIC BLOOD PRESSURE: 130 MMHG

## 2020-03-05 PROCEDURE — 1036F TOBACCO NON-USER: CPT | Performed by: INTERNAL MEDICINE

## 2020-03-05 PROCEDURE — 1123F ACP DISCUSS/DSCN MKR DOCD: CPT | Performed by: INTERNAL MEDICINE

## 2020-03-05 PROCEDURE — G8417 CALC BMI ABV UP PARAM F/U: HCPCS | Performed by: INTERNAL MEDICINE

## 2020-03-05 PROCEDURE — 1090F PRES/ABSN URINE INCON ASSESS: CPT | Performed by: INTERNAL MEDICINE

## 2020-03-05 PROCEDURE — G8400 PT W/DXA NO RESULTS DOC: HCPCS | Performed by: INTERNAL MEDICINE

## 2020-03-05 PROCEDURE — G8427 DOCREV CUR MEDS BY ELIG CLIN: HCPCS | Performed by: INTERNAL MEDICINE

## 2020-03-05 PROCEDURE — 99214 OFFICE O/P EST MOD 30 MIN: CPT | Performed by: INTERNAL MEDICINE

## 2020-03-05 PROCEDURE — 93227 XTRNL ECG REC<48 HR R&I: CPT | Performed by: INTERNAL MEDICINE

## 2020-03-05 PROCEDURE — 4040F PNEUMOC VAC/ADMIN/RCVD: CPT | Performed by: INTERNAL MEDICINE

## 2020-03-05 PROCEDURE — G8484 FLU IMMUNIZE NO ADMIN: HCPCS | Performed by: INTERNAL MEDICINE

## 2020-03-05 NOTE — LETTER
[facility]  [address], [address]  [facility], [address]  Phone: [phone]    Fax [phone]                  Denise Whitehead MD, Sterling Cantu MD, Rosa Andino MD, MD Minal Díaz MD Houston Gilding, MD Donald Erichsen, APRN-CNP   Kera Hernandez, APRN-CNP  Maribell Calvillo, APRN-CNP    Carlos Das, APRN-CNP    Cardiac Risk Assessment   3/6/2020        Surgery Date: 4/16/20  Surgery: Colonoscopy  Anesthesia Type: Propofol  Fax Number: [phone]    To: Dr. Todd Olmos  From : Be Bee MD, [facility]    Patient Name: Madan Christianson     YOB: 1940  MRN: [id number]    Madan Christianson was evaluated from a cardiac standpoint for her surgery or procedure and based on her history, diagnosis, recent cardiac testing, she is considered a low risk candidate for any winnie-operative cardiac complications. Antiplatelet/anticoagulant therapy can be held prior to the surgery or procedure at the discretion of the surgeon to be resumed as soon as possible if held. Please call with any further questions.     Respectfully,          Dillon Martinez MD, MA/christis

## 2020-03-05 NOTE — PROGRESS NOTES
Allergies: Latex; Amlodipine; Gentamicin; Cozaar [losartan]; Amlodipine besylate; Codeine; Contrast [iodides]; Iodine; Levaquin [levofloxacin]; Losartan potassium; Macrodantin [nitrofurantoin macrocrystal]; Metoprolol; Nutritional supplements; Penicillins; Wasp venom; and Zocor [simvastatin - high dose]  Past Medical History:   Diagnosis Date    Asthma     Atrial fibrillation ([facility] [address] 2020    Bronchitis, asthmatic     Chest pain     Atypical    Fibromyalgia     H/O cardiac catheterization 08/02/1991    Patent coronary aterties with preserved left ventricular funciton.  H/O cardiovascular stress test     10/12 Normal EF70%,11/17/09 (Dominick) Normal pattern of perfusion in all regions. Post stress left venticle is normal in size. Normal Study. Normal perfusion in the distribution of all coronaries. Normal LV size and funciton. Rest EF is 70%. Global left ventricular systolic function is normal. Exercise capacity 7 METS. Exercise capacity is normal. 6/98, 6/99, 8/07,     H/O cardiovascular stress test 07/11/2019    Normal study.  H/O complete electrocardiogram     9/1/05 NSR, occ. PVC. 3/14/05, 7/26/02, 7/2/97, 6/5/98, 3/16/98, 2/2/90    H/O Doppler lower venous ultrasound 07/11/2019     Significant reflux noted of the Right CFV. No DVT.  H/O echocardiogram     11/17/09 2D, M-mode, Doppler and color flow Doppler. Left ventricle is normal size. Normal left ventricular wall thickness. Left ventricular systolic function is normal.  EF =>55%. Transmitral spectral Doppler flow pattern is suggestive of impaired LV relaxation. Left ventricular wass motion is normal.  6/98, 5/19/05,    H/O echocardiogram 07/11/2019    EF 45-50%, Mild AR, TR, MR & PHTN.     Hyperlipidemia     Hypertension     Macular degeneration      Past Surgical History:   Procedure Laterality Date    BLADDER REMOVAL      1973, 1974    BREAST BIOPSY      1971, 72, 76, 76, 80 and 1997, benign    CARPAL TUNNEL RELEASE      Right    CATARACT REMOVAL  03/03/2017    Left Eye    FOOT SURGERY  8/09    Left    HYSTERECTOMY      KNEE SURGERY  12/10    Left    OTHER SURGICAL HISTORY      Bone Spurs 1990, 1993     Family History   Problem Relation Age of Onset    Cancer Mother     Coronary Art Dis Mother     Other Mother         CABG    Coronary Art Dis Father     Dementia Father     Other Father         CHF, CABG    Stroke Father     Cancer Brother      Social History     Tobacco Use    Smoking status: Never Smoker    Smokeless tobacco: Never Used   Substance Use Topics    Alcohol use: Yes     Alcohol/week: 1.0 standard drinks     Types: 1 Glasses of wine per week     Comment: Rarely alcohol, 1 decaf coffee daily      [unfilled]  Review of Systems:   · Constitutional: No Fever or Weight Loss   · Eyes: No Decreased Vision  · ENT: No Headaches, Hearing Loss or Vertigo  · Cardiovascular: No chest pain, dyspnea on exertion, palpitations or loss of consciousness  · Respiratory: No cough or wheezing    · Gastrointestinal: No abdominal pain, appetite loss, blood in stools, constipation, diarrhea or heartburn  · Genitourinary: No dysuria, trouble voiding, or hematuria  · Musculoskeletal:  No gait disturbance, weakness or joint complaints  · Integumentary: No rash or pruritis  · Neurological: No TIA or stroke symptoms  · Psychiatric: No anxiety or depression  · Endocrine: No malaise, fatigue or temperature intolerance  · Hematologic/Lymphatic: No bleeding problems, blood clots or swollen lymph nodes  · Allergic/Immunologic: No nasal congestion or hives  All systems negative except as marked. Objective:  /80   Pulse 60   Ht 5' 4\" (1.626 m)   Wt 214 lb 12.8 oz (97.4 kg)   BMI 36.87 kg/m²   Wt Readings from Last 3 Encounters:   03/05/20 214 lb 12.8 oz (97.4 kg)   02/27/20 214 lb 12.8 oz (97.4 kg)   02/11/20 211 lb 6.4 oz (95.9 kg)     Body mass index is 36.87 kg/m².   GENERAL - Alert, oriented, pleasant, in no apparent distress,normal grooming  HEENT - pupils are reactive to light and accomodation, cornea intact, conjunctive normal color, ears are normal in exam,throat exam in normal, teeth, gum and palate are normal, oral mucosa is normal without any notation of pallor or cyanosis  Neck - Supple. No jugular venous distention noted. No carotid bruits, no apical -carotid delay  Respiratory:  Normal breath sounds, No respiratory distress, No wheezing, No chest tenderness. ,no use of accessory muscles, diaphragm movement is normal  Cardiovascular: (PMI) apex non displaced,no lifts no thrills, no s3,no s4, Normal heart rate, Normal rhythm, No murmurs, No rubs, No gallops. Carotid arteries pulse and amplitude are normal no bruit, no abdominal bruit noted ( normal abdominal aorta ausculation), femoral arteries pulse and amplitude are normal no bruit, pedal pulses are normal  Femoral pulses have normal amplitude, no bruits   Extremities - No cyanosis, clubbing, or significant edema, no varicose veins    Abdomen - No masses, tenderness, or organomegaly, no hepato-splenomegally, no bruits  Musculoskeletal - No significant edema, no kyphosis or scoliosis, no deformity in any extremity noted, muscle strength and tone are normal  Skin: no ulcer,no scar,no stasis dermatitis   Neurologic - alert oriented times 3,Cranial nerves II through XII are grossly intact. There were no gross focal neurologic abnormalities.  All sensory and motor nerves examined and were normal  Psychiatric: normal mood and affect    Lab Results   Component Value Date    TROPONINI 0.015 01/31/2020     BNP:  No results found for: BNP  PT/INR:  No results found for: PTINR  No results found for: LABA1C  Lab Results   Component Value Date    CHOL 164 05/09/2019    TRIG 100 05/09/2019    HDL 60 05/09/2019    LDLCALC 84 05/09/2019    LDLDIRECT 93 11/15/2017     Lab Results   Component Value Date    ALT 31 01/31/2020    AST 14 01/31/2020     TSH:    Lab Results   Component Value Date    TSH 1.88

## 2020-03-05 NOTE — LETTER
CLINICAL STAFF DOCUMENTATION    Mouna Brittany  1940  [id number]    Have you had any Chest Pain - No    Have you had any Shortness of Breath - Yes  If Yes - When on exertion    Have you had any dizziness - No      Have you had any palpitations - No      Do you have any edema -  No  Do you have a surgery or procedure scheduled in the near future - No      Ask patient if they want to sign up for MyChart if they are not already signed up    Check to see if we have an E-MAIL on file for the patient    Check medication list thoroughly!!!  BE SURE TO ASK PATIENT IF THEY NEED MEDICATION REFILLS***

## 2020-03-11 ENCOUNTER — TELEPHONE (OUTPATIENT)
Dept: CARDIOLOGY CLINIC | Age: 80
End: 2020-03-11

## 2020-04-17 ENCOUNTER — INITIAL CONSULT (OUTPATIENT)
Dept: CARDIOLOGY CLINIC | Age: 80
End: 2020-04-17
Payer: MEDICARE

## 2020-04-17 VITALS
SYSTOLIC BLOOD PRESSURE: 130 MMHG | BODY MASS INDEX: 36.09 KG/M2 | RESPIRATION RATE: 14 BRPM | HEART RATE: 93 BPM | WEIGHT: 211.4 LBS | DIASTOLIC BLOOD PRESSURE: 86 MMHG | OXYGEN SATURATION: 98 % | HEIGHT: 64 IN

## 2020-04-17 PROCEDURE — G8427 DOCREV CUR MEDS BY ELIG CLIN: HCPCS | Performed by: INTERNAL MEDICINE

## 2020-04-17 PROCEDURE — 99204 OFFICE O/P NEW MOD 45 MIN: CPT | Performed by: INTERNAL MEDICINE

## 2020-04-17 PROCEDURE — 1123F ACP DISCUSS/DSCN MKR DOCD: CPT | Performed by: INTERNAL MEDICINE

## 2020-04-17 PROCEDURE — 4040F PNEUMOC VAC/ADMIN/RCVD: CPT | Performed by: INTERNAL MEDICINE

## 2020-04-17 PROCEDURE — G8400 PT W/DXA NO RESULTS DOC: HCPCS | Performed by: INTERNAL MEDICINE

## 2020-04-17 PROCEDURE — 1090F PRES/ABSN URINE INCON ASSESS: CPT | Performed by: INTERNAL MEDICINE

## 2020-04-17 PROCEDURE — 93000 ELECTROCARDIOGRAM COMPLETE: CPT | Performed by: INTERNAL MEDICINE

## 2020-04-17 RX ORDER — CLOPIDOGREL BISULFATE 75 MG/1
75 TABLET ORAL DAILY
Qty: 30 TABLET | Refills: 3 | Status: SHIPPED | OUTPATIENT
Start: 2020-04-17 | End: 2020-05-12 | Stop reason: ALTCHOICE

## 2020-04-17 NOTE — PROGRESS NOTES
Electrophysiology Consult Note      Reason for consultation:  Atrial fibrillation    Chief complaint : Tiredness and no energy    Referring physician: Dr. French [facility]      Primary care physician: Mauricio Ingram MD      History of Present Illness:     Chief Complaint   Patient presents with    Hypertension     Pt here for A-fib. Pt states she gets very anxious and tired all of the time, Pt states she doesn't have energy. Pt denies Chest Pain, Palpitations. SOB, Dizziness, Edema.  Atrial Fibrillation     Patient reports she initially started noted having A. fib while she was in [facility] last year. She was having palpitations at that time and she was also feeling very tired and weak so she went to the hospital there and she was started on Xarelto and heart rate medication. Patient reported that she had bleeding couple of days and she had to go to the hospital again and she was stopped on xarelto and she was told that she may not be a candidate for it. She did not have any work-up for bleeding. She has awaiting GI work-up. Her main complaint is she does not have any energy at all  Feels tired and weak all the time. Palpitations have subsided with medication    She was getting GI work-up when she was noted to be in atrial fibrillation and she was told that she needs cardiac assessment prior to that. Claudette Darner saw the patient for cardiac assessment and referred here for atrial fibrillation management. Pastmedical history:   Past Medical History:   Diagnosis Date    Asthma     Atrial fibrillation ([address] 2020    Bronchitis, asthmatic     Chest pain     Atypical    Fibromyalgia     H/O cardiac catheterization 08/02/1991    Patent coronary aterties with preserved left ventricular funciton.  H/O cardiovascular stress test     10/12 Normal EF70%,11/17/09 (Dominick) Normal pattern of perfusion in all regions. Post stress left venticle is normal in size. CBC:   Lab Results   Component Value Date    WBC 7.3 01/31/2020    HGB 15.6 01/31/2020    HCT 47.4 01/31/2020     01/31/2020     Lipids:  Lab Results   Component Value Date    CHOL 164 05/09/2019    TRIG 100 05/09/2019    HDL 60 05/09/2019    LDLCALC 84 05/09/2019    LDLDIRECT 93 11/15/2017     PT/INR:   Lab Results   Component Value Date    INR 0.91 01/31/2020        BMP:    Lab Results   Component Value Date     01/31/2020    K 4.5 01/31/2020     01/31/2020    CO2 27 01/31/2020    BUN 21 01/31/2020     CMP:   Lab Results   Component Value Date    AST 14 01/31/2020    PROT 6.7 11/15/2017    BILITOT 0.4 01/31/2020    ALKPHOS 84 01/31/2020     TSH:    Lab Results   Component Value Date    TSH 1.88 01/31/2020       EKGINTERPRETATION - EKG Interpretation:  Fine atrial fibrillation with controlled rate      IMPRESSION / RECOMMENDATIONS:     Persistent atrial fibrillation  Cardiac assessment prior to GI work up  HLD  HTN  Obesity BMI 36        Afib noted while she was in [employer]  Patient was on xarelto and she bled  She went to hospital and Willma Clear was stopped  Patient now in persistent atrial fibrillation  CHADS-VAS - 4 atleast  Detail conversation about pathophysiology of atrial fibrillation discussed with patient    I am worried that she will be high risk for stroke if she is not on anticoagulation  At least till she gets her endoscopy and colonoscopy done we should probably consider patient being on aspirin and Plavix to decrease the risk of stroke  As aspirin and Plavix together is a class II a recommendation for people not be on anticoagulation  Rate is reasonably controlled at this time so continue with present medications    Once patient is able to take anticoagulation I would recommend to have cardioversion with antiarrhythmic  discussed in detail with patient about the risk of being in atrial fibrillation    From my standpoint she can proceed with endoscopy and colonoscopy    Will follow her after GI work up. Thanks again for allowing me to participate in care of this patient. Please call me if you have any questions. With best regards. Mindy Kathleen MD, 4/17/2020 9:49 AM        I spent 45-50 minutes face-to-face with the patient today with more than 50% time for counseling regarding atrial fibrillation, stroke risk and bleeding risk. Please note this report has been partially produced using speech recognition software and may contain errors related to that system including errors in grammar, punctuation, and spelling, as well as words and phrases that may be inappropriate. If there are any questions or concerns please feel free to contact the dictating provider for clarification.

## 2020-05-05 ENCOUNTER — TELEPHONE (OUTPATIENT)
Dept: CARDIOLOGY CLINIC | Age: 80
End: 2020-05-05

## 2020-05-08 ENCOUNTER — TELEPHONE (OUTPATIENT)
Dept: CARDIOLOGY CLINIC | Age: 80
End: 2020-05-08

## 2020-05-08 NOTE — TELEPHONE ENCOUNTER
Patient was instructed to call back after   She had her colonoscopy done   And we would set her up for cardioversion  Second call please call back

## 2020-05-10 ASSESSMENT — ENCOUNTER SYMPTOMS
COUGH: 0
VOMITING: 0
WHEEZING: 0
EYE PAIN: 0
NAUSEA: 0
CONSTIPATION: 0
PHOTOPHOBIA: 0
BACK PAIN: 0
CHEST TIGHTNESS: 0
DIARRHEA: 0
SHORTNESS OF BREATH: 0
BLOOD IN STOOL: 0
ABDOMINAL PAIN: 0
COLOR CHANGE: 0

## 2020-05-11 NOTE — TELEPHONE ENCOUNTER
Spoke with patient advised her that we are waiting on Dr. Phuong Arndt office to call us back. Dr Asha Pérez wants to start patient on Eliquis 5 mg BID and stop Plavix but we need the ok from them first. As soon as they call I will call her back to advise and get her scheduled for procedure. Patient is to be scheduled for Ancelmo/Cardioversion with Sotalol or Tikosyn. Left message for [facility] at Dr Phuong Arndt office. Waiting on call back.

## 2020-05-12 ENCOUNTER — TELEPHONE (OUTPATIENT)
Dept: CARDIOLOGY CLINIC | Age: 80
End: 2020-05-12

## 2020-05-12 NOTE — TELEPHONE ENCOUNTER
Tried to call Dr. Nate Hoyos office again to ask about Eliquis. No answer left message asking Jonatan to please return my call.

## 2020-05-12 NOTE — TELEPHONE ENCOUNTER
[address] from Dr. Esteban Dos Santos office, patient is cleared to start taking Eliquis 5 mg BID.

## 2020-05-12 NOTE — LETTER
called an IV. But you may get medicines to take by mouth. You may feel a quick sting or pinch when the IV starts. The procedure usually takes about 30 to 60 minutes for procedure. Transesophageal Echocardiogram  What is a transesophageal echocardiogram?  A transesophageal echocardiogram is a test to help your doctor look at the inside of your heart. A small device called a transducer directs sound waves toward your heart. The sound waves make a picture of the heart's valves and chambers. Your doctor may do this test to look for certain types of heart disease. Or it may be done to see how disease is affecting your heart. You will be given medicine to make you sleepy and comfortable during the test. The doctor puts a small, flexible tube into your throat and guides it to the esophagus. This is the tube that connects your mouth to your stomach. The doctor will ask you to swallow as the tube goes down. The transducer is at the tip of the tube. It gets close to your heart to make clear pictures. The doctor will look at the ultrasound pictures on a screen. You will not be able to eat or drink until the numbness from the throat spray wears off. Your throat may be sore for a few days after the test.  Follow-up care is a key part of your treatment and safety. Be sure to make and go to all appointments, and call your doctor if you are having problems. It's also a good idea to know your test results and keep a list of the medicines you take. [address]/CARDIOLOGY        Patient Name: Shannan Tbubs   : 1940   MRN# [id number]    Transesophageal Echocardiogram with Cardioversion With Sotalol Loading    Day of Procedure Cath Lab Holding Area Preop Orders:    ? YOU HAVE MY PERMISSION TO TALK TO THE PATIENT-PLEASE    ? Anesthesia    ? KHARI with Anesthesia    ? IV peripheral saline lock  (preferred left arm).     ? STAT LABS ON ARRIVAL: BMP, MAG, PHOS, CBC, PT INR, PTT

## 2020-05-12 NOTE — TELEPHONE ENCOUNTER
Omaira Rinaldi called and spoke with Pharmacist at [address] [facility] will cost patient $60 a month, she wants to go with Sotalol.

## 2020-05-22 ENCOUNTER — VIRTUAL VISIT (OUTPATIENT)
Dept: CARDIOLOGY CLINIC | Age: 80
End: 2020-05-22

## 2020-05-22 VITALS
DIASTOLIC BLOOD PRESSURE: 80 MMHG | BODY MASS INDEX: 36.02 KG/M2 | HEART RATE: 103 BPM | SYSTOLIC BLOOD PRESSURE: 131 MMHG | WEIGHT: 211 LBS | HEIGHT: 64 IN

## 2020-05-22 RX ORDER — LABETALOL 100 MG/1
100 TABLET, FILM COATED ORAL 2 TIMES DAILY
Qty: 180 TABLET | Refills: 3 | Status: ON HOLD | OUTPATIENT
Start: 2020-05-22 | End: 2020-06-08 | Stop reason: HOSPADM

## 2020-05-22 RX ORDER — ATORVASTATIN CALCIUM 10 MG/1
10 TABLET, FILM COATED ORAL DAILY
Qty: 90 TABLET | Refills: 1 | Status: SHIPPED | OUTPATIENT
Start: 2020-05-22 | End: 2021-01-04 | Stop reason: SDUPTHER

## 2020-05-22 NOTE — PROGRESS NOTES
Shelly Abarca is a 70 y.o. female evaluated via telephone on 5/22/2020. Consent:  She and/or health care decision maker is aware that that she may receive a bill for this telephone service, depending on her insurance coverage, and has provided verbal consent to proceed: Yes      Documentation:  I communicated with the patient   Details of this discussion including any medical advice provided:       I affirm this is a Patient Initiated Episode with a Patient who has not had a related appointment within my department in the past 7 days or scheduled within the next 24 hours. Patient identification was verified at the start of the visit: Yes    Total Time: 13    Note: not billable if this call serves to triage the patient into an appointment for the relevant concern      Pasquale Isaacs MD    TELEHEALTH EVALUATION -- Audio (During EVHRQ-35 public health emergency)      Chief complaints: patient is here for management of shortness of breath, HTN, DYSLPIDEMIA, leg swelling, afib, H/O GI Bleeding  Subjective: patient feels better, no chest pain, no shortness of breath, no dizziness, no palpitations    HPI Angie Hopkins is a 70 y. o.year old who  has a past medical history of Asthma, Atrial fibrillation ([address] Bronchitis, asthmatic, Chest pain, Fibromyalgia, H/O cardiac catheterization, H/O cardiovascular stress test, H/O cardiovascular stress test, H/O complete electrocardiogram, H/O Doppler lower venous ultrasound, H/O echocardiogram, H/O echocardiogram, Hyperlipidemia, Hypertension, and Macular degeneration. and presents for management of shortness of breath, HTN, DYSLPIDEMIA, leg swelling, afibwhich are well controlled, she had colonocopsy and had 2 polypectomy and found diverticulosis, she also had cystoscopy, she will bladder and kidney ultrasound.  She started on eliquis and will get dc cardioversion on June 8th as per EP      Current Outpatient Medications   Medication Sig Dispense Refill    apixaban (ELIQUIS) 5 MG TABS tablet Take 1 tablet by mouth 2 times daily 60 tablet 1    dilTIAZem (CARDIZEM) 30 MG tablet Take 30 mg by mouth 2 times daily       Acetaminophen (TYLENOL EXTRA STRENGTH PO) Take by mouth 4 times daily as needed      Carboxymethylcellulose Sodium (EYE DROPS OP) Apply to eye AREDS for eyes      atorvastatin (LIPITOR) 10 MG tablet Take 1 tablet by mouth daily 90 tablet 1    labetalol (NORMODYNE) 100 MG tablet Take 1 tablet by mouth 2 times daily 180 tablet 3    Probiotic Product (PROBIOTIC DAILY PO) Take by mouth      triamterene-hydrochlorothiazide (MAXZIDE-25) 37.5-25 MG per tablet Take 1 tablet by mouth daily Indications: 1 every 3 days      Calcium Carbonate-Vitamin D (CALTRATE 600+D PO) Take  by mouth daily. No current facility-administered medications for this visit. Allergies: Latex; Amlodipine; Gentamicin; Cozaar [losartan]; Amlodipine besylate; Codeine; Contrast [iodides]; Iodine; Levaquin [levofloxacin]; Losartan potassium; Macrodantin [nitrofurantoin macrocrystal]; Metoprolol; Nutritional supplements; Penicillins; Wasp venom; and Zocor [simvastatin - high dose]  Past Medical History:   Diagnosis Date    Asthma     Atrial fibrillation ([address] 2020    Bronchitis, asthmatic     Chest pain     Atypical    Fibromyalgia     H/O cardiac catheterization 08/02/1991    Patent coronary aterties with preserved left ventricular funciton.  H/O cardiovascular stress test     10/12 Normal EF70%,11/17/09 (Dominick) Normal pattern of perfusion in all regions. Post stress left venticle is normal in size. Normal Study. Normal perfusion in the distribution of all coronaries. Normal LV size and funciton. Rest EF is 70%. Global left ventricular systolic function is normal. Exercise capacity 7 METS. Exercise capacity is normal. 6/98, 6/99, 8/07,     H/O cardiovascular stress test 07/11/2019    Normal study.     H/O complete electrocardiogram     9/1/05 KIDNEY ULTRASOUND  4. Shortness of breath:resolved since her blood pressure is controlled, on labetalol, has nicole stress test and echo   5. HTN: stable, home blood pressure reading noted, continue  labetalol   6. Leg swelling\" venous doppler showed severe rt CFV reflux, she is better with compression socks, will continue it  7. Dyslipidemia:continue statinsHealth maintenance: exerise and dietAll labs, medications and tests reviewed, continue all other medications of all above medical condition listed as is. Moni Lerma is a 70 y.o. female being evaluated by a Virtual Visit (audio visit) encounter to address concerns as mentioned above. A caregiver was present when appropriate. Due to this being a TeleHealth encounter (During [facility] public health emergency), evaluation of the following organ systems was limited: Vitals/Constitutional/EENT/Resp/CV/GI//MS/Neuro/Skin/Heme-Lymph-Imm. Pursuant to the emergency declaration under the [address] [place] [address] and the [employer] Act, this Virtual Visit was conducted with patient's (and/or legal guardian's) consent, to reduce the patient's risk of exposure to COVID-19 and provide necessary medical care. The patient (and/or legal guardian) has also been advised to contact this office for worsening conditions or problems, and seek emergency medical treatment and/or call 911 if deemed necessary. Services were provided through a audio synchronous discussion virtually to substitute for in-person clinic visit. Patient and provider were located at their individual homes. 1.   I confirm that this visit was completed in a telehealth setting ,using synchronous audiovisual technology for real time patient interaction . The patient identity with name and date of birth was confirmed .  This evaluation of patient was done by telehealth in the setting of COVID-19 Public health emergency , which precluded assurance of safe in person visit at the time of service. The patient consented to and accepts potential risks associated with telemedical evaluation and care was taken to assess glenna presence of any medical issues that would be more  appropriate for expedited in -person care. Pursuant to the emergency declaration under the [address] [place] [address] waiver authority and the [employer] and [employer] Act, this Virtual  Visit was conducted, with patient's consent, to reduce the patient's risk of exposure to COVID-19 and provide continuity of care for an established patient. Services were provided through a video synchronous discussion virtually to substitute for in-person clinic visit. 2. I Affirm this is a Patient Initiated Episode with an Established Patient who has not had a related appointment within my department in the past 7 days or scheduled within the next 24 hours. --Aline Shelton MD on 5/22/2020 at 10:05 AM    An electronic signature was used to authenticate this note.

## 2020-05-27 ENCOUNTER — TELEPHONE (OUTPATIENT)
Dept: CARDIOLOGY CLINIC | Age: 80
End: 2020-05-27

## 2020-05-27 NOTE — TELEPHONE ENCOUNTER
Patient here in office and educated on Ancelmo/Cardioversion with Sotalol Loading, schedule for 6/8/20 @ 1100, with arrival @ 0900, @ [facility]; risk explained; and consents signed. Also copy of orders given for COVID-19 Test due 6/3/20 at [facility]. Instruction given to patient to :  NPO after midnight the night before procedure; call hospital at [phone] to pre-register. May take rest of morning meds of procedure. Patient voiced understanding. Copies of consent & info scanned in chart. Patient complain of symptoms wants to proceed with cardiac procedure. If not patient he may need to come to Emergency Department and be admitted through ED. His concern is COVID too if comes from ED    Below Questions were asks. The patient was counseled at length about the risks of griffin Covid-19 in the winnie-operative and post-operative states including the recovery window of their procedure. The patient was made aware that griffin Covid-19 after a surgical procedure may worsen their prognosis for recovering from the virus   and lend to a higher morbidity and or mortality risk. The patient was given the options of postponing their procedure. All of the risks, benefits, and alternatives were discussed. The patient does wish to proceed with the procedure.     Patient understands the risk and wants to proceed with procedure  We are scheduling he procedure as this being a time sensitive procedure as described by the physician based on patient complaints

## 2020-06-03 ENCOUNTER — HOSPITAL ENCOUNTER (OUTPATIENT)
Age: 80
Setting detail: SPECIMEN
Discharge: HOME | End: 2020-06-03
Payer: MEDICARE

## 2020-06-03 PROCEDURE — U0002 COVID-19 LAB TEST NON-CDC: HCPCS

## 2020-06-05 ENCOUNTER — ANESTHESIA EVENT (OUTPATIENT)
Dept: CARDIAC CATH/INVASIVE PROCEDURES | Age: 80
End: 2020-06-05
Payer: MEDICARE

## 2020-06-05 LAB
SARS-COV-2: NOT DETECTED
SOURCE: NORMAL

## 2020-06-05 NOTE — ANESTHESIA PRE PROCEDURE
Department of Anesthesiology  Preprocedure Note       Name:  Duc Marcos   Age:  70 y.o.  :  1940                                          MRN:  [id number]         Date:  2020      Surgeon: * Surgery not found *    Procedure:     Medications prior to admission:   Prior to Admission medications    Medication Sig Start Date End Date Taking? Authorizing Provider   dilTIAZem (CARDIZEM) 30 MG tablet Take 1 tablet by mouth 2 times daily 20   Scooby Garnica MD   atorvastatin (LIPITOR) 10 MG tablet Take 1 tablet by mouth daily 20   Scooby Garnica MD   labetalol (NORMODYNE) 100 MG tablet Take 1 tablet by mouth 2 times daily 20   Scooby Garnica MD   apixaban (ELIQUIS) 5 MG TABS tablet Take 1 tablet by mouth 2 times daily 20  Rakan Haro MD   Acetaminophen (TYLENOL EXTRA STRENGTH PO) Take by mouth 4 times daily as needed    Historical Provider, MD   Carboxymethylcellulose Sodium (EYE DROPS OP) Apply to eye AREDS for eyes    Historical Provider, MD   Probiotic Product (PROBIOTIC DAILY PO) Take by mouth    Historical Provider, MD   triamterene-hydrochlorothiazide (MAXZIDE-25) 37.5-25 MG per tablet Take 1 tablet by mouth daily Indications: 1 every 3 days    Historical Provider, MD   Calcium Carbonate-Vitamin D (CALTRATE 600+D PO) Take  by mouth daily.       Historical Provider, MD       Current medications:    Current Outpatient Medications   Medication Sig Dispense Refill    dilTIAZem (CARDIZEM) 30 MG tablet Take 1 tablet by mouth 2 times daily 180 tablet 3    atorvastatin (LIPITOR) 10 MG tablet Take 1 tablet by mouth daily 90 tablet 1    labetalol (NORMODYNE) 100 MG tablet Take 1 tablet by mouth 2 times daily 180 tablet 3    apixaban (ELIQUIS) 5 MG TABS tablet Take 1 tablet by mouth 2 times daily 60 tablet 1    Acetaminophen (TYLENOL EXTRA STRENGTH PO) Take by mouth 4 times daily as needed      Carboxymethylcellulose Sodium (EYE DROPS OP) Apply to eye AREDS for eyes      Probiotic Product (PROBIOTIC DAILY PO) Take by mouth      triamterene-hydrochlorothiazide (MAXZIDE-25) 37.5-25 MG per tablet Take 1 tablet by mouth daily Indications: 1 every 3 days      Calcium Carbonate-Vitamin D (CALTRATE 600+D PO) Take  by mouth daily. No current facility-administered medications for this encounter. Allergies: Allergies   Allergen Reactions    Latex     Amlodipine Nausea Only    Gentamicin     Cozaar [Losartan] Swelling    Amlodipine Besylate Swelling     Muscle pains, swelling, cough    Codeine     Contrast [Iodides]     Iodine     Levaquin [Levofloxacin]     Losartan Potassium     Macrodantin [Nitrofurantoin Macrocrystal]     Metoprolol     Nutritional Supplements     Penicillins     Wasp Venom     Zocor [Simvastatin - High Dose]        Problem List:    Patient Active Problem List   Diagnosis Code    Hyperlipidemia E78.5    Chest pain R07.9    HTN (hypertension) I5    FH: CAD (coronary artery disease) Z82.49    Obesity E66.9    Fibrocystic disease of both breasts N60.12, N60.11    A-fib ([facility] [address] I48.91       Past Medical History:        Diagnosis Date    Asthma     Atrial fibrillation ([facility] [address] 2020    Bronchitis, asthmatic     Chest pain     Atypical    Fibromyalgia     H/O cardiac catheterization 08/02/1991    Patent coronary aterties with preserved left ventricular funciton.  H/O cardiovascular stress test     10/12 Normal EF70%,11/17/09 (Dominick) Normal pattern of perfusion in all regions. Post stress left venticle is normal in size. Normal Study. Normal perfusion in the distribution of all coronaries. Normal LV size and funciton. Rest EF is 70%. Global left ventricular systolic function is normal. Exercise capacity 7 METS. Exercise capacity is normal. 6/98, 6/99, 8/07,     H/O cardiovascular stress test 07/11/2019    Normal study.  H/O complete electrocardiogram     9/1/05 NSR, occ.  PVC. 3/14/05, 7/26/02, 7/2/97,

## 2020-06-08 ENCOUNTER — ANESTHESIA (OUTPATIENT)
Dept: CARDIAC CATH/INVASIVE PROCEDURES | Age: 80
End: 2020-06-08
Payer: MEDICARE

## 2020-06-08 ENCOUNTER — HOSPITAL ENCOUNTER (OUTPATIENT)
Dept: CARDIAC CATH/INVASIVE PROCEDURES | Age: 80
Discharge: HOME | End: 2020-06-08
Attending: INTERNAL MEDICINE | Admitting: INTERNAL MEDICINE
Payer: MEDICARE

## 2020-06-08 VITALS
HEART RATE: 86 BPM | DIASTOLIC BLOOD PRESSURE: 74 MMHG | SYSTOLIC BLOOD PRESSURE: 140 MMHG | WEIGHT: 211 LBS | OXYGEN SATURATION: 96 % | TEMPERATURE: 98 F | RESPIRATION RATE: 14 BRPM | HEIGHT: 64 IN | BODY MASS INDEX: 36.02 KG/M2

## 2020-06-08 VITALS
DIASTOLIC BLOOD PRESSURE: 67 MMHG | RESPIRATION RATE: 20 BRPM | OXYGEN SATURATION: 94 % | SYSTOLIC BLOOD PRESSURE: 137 MMHG

## 2020-06-08 PROBLEM — I48.19 ATRIAL FIBRILLATION, PERSISTENT (HCC): Status: ACTIVE | Noted: 2020-02-11

## 2020-06-08 LAB
ANION GAP SERPL CALCULATED.3IONS-SCNC: 11 MMOL/L (ref 4–16)
APTT: 32.6 SECONDS (ref 25.1–37.1)
BUN BLDV-MCNC: 18 MG/DL (ref 6–23)
CALCIUM SERPL-MCNC: 8.7 MG/DL (ref 8.3–10.6)
CHLORIDE BLD-SCNC: 104 MMOL/L (ref 99–110)
CO2: 25 MMOL/L (ref 21–32)
CREAT SERPL-MCNC: 0.8 MG/DL (ref 0.6–1.1)
EKG ATRIAL RATE: 101 BPM
EKG ATRIAL RATE: 89 BPM
EKG DIAGNOSIS: NORMAL
EKG P AXIS: 54 DEGREES
EKG P AXIS: 58 DEGREES
EKG P-R INTERVAL: 182 MS
EKG P-R INTERVAL: 210 MS
EKG Q-T INTERVAL: 360 MS
EKG Q-T INTERVAL: 368 MS
EKG Q-T INTERVAL: 378 MS
EKG QRS DURATION: 72 MS
EKG QRS DURATION: 74 MS
EKG QRS DURATION: 78 MS
EKG QTC CALCULATION (BAZETT): 445 MS
EKG QTC CALCULATION (BAZETT): 459 MS
EKG QTC CALCULATION (BAZETT): 477 MS
EKG R AXIS: 13 DEGREES
EKG R AXIS: 34 DEGREES
EKG R AXIS: 43 DEGREES
EKG T AXIS: 10 DEGREES
EKG T AXIS: 22 DEGREES
EKG T AXIS: 27 DEGREES
EKG VENTRICULAR RATE: 101 BPM
EKG VENTRICULAR RATE: 89 BPM
EKG VENTRICULAR RATE: 92 BPM
GFR AFRICAN AMERICAN: >60 ML/MIN/1.73M2
GFR NON-AFRICAN AMERICAN: >60 ML/MIN/1.73M2
GLUCOSE BLD-MCNC: 109 MG/DL (ref 70–99)
HCT VFR BLD CALC: 44.8 % (ref 37–47)
HEMOGLOBIN: 14.6 GM/DL (ref 12.5–16)
INR BLD: 1.2 INDEX
MAGNESIUM: 2.1 MG/DL (ref 1.8–2.4)
MCH RBC QN AUTO: 30.5 PG (ref 27–31)
MCHC RBC AUTO-ENTMCNC: 32.6 % (ref 32–36)
MCV RBC AUTO: 93.7 FL (ref 78–100)
PDW BLD-RTO: 13.9 % (ref 11.7–14.9)
PHOSPHORUS: 3.4 MG/DL (ref 2.5–4.9)
PLATELET # BLD: 199 K/CU MM (ref 140–440)
PMV BLD AUTO: 11.5 FL (ref 7.5–11.1)
POTASSIUM SERPL-SCNC: 4.6 MMOL/L (ref 3.5–5.1)
PROTHROMBIN TIME: 14.6 SECONDS (ref 11.7–14.5)
RBC # BLD: 4.78 M/CU MM (ref 4.2–5.4)
SODIUM BLD-SCNC: 140 MMOL/L (ref 135–145)
WBC # BLD: 7.2 K/CU MM (ref 4–10.5)

## 2020-06-08 PROCEDURE — 85027 COMPLETE CBC AUTOMATED: CPT

## 2020-06-08 PROCEDURE — 92960 CARDIOVERSION ELECTRIC EXT: CPT | Performed by: INTERNAL MEDICINE

## 2020-06-08 PROCEDURE — 85610 PROTHROMBIN TIME: CPT

## 2020-06-08 PROCEDURE — 93325 DOPPLER ECHO COLOR FLOW MAPG: CPT | Performed by: INTERNAL MEDICINE

## 2020-06-08 PROCEDURE — 93005 ELECTROCARDIOGRAM TRACING: CPT | Performed by: INTERNAL MEDICINE

## 2020-06-08 PROCEDURE — 93010 ELECTROCARDIOGRAM REPORT: CPT | Performed by: INTERNAL MEDICINE

## 2020-06-08 PROCEDURE — 2580000003 HC RX 258: Performed by: NURSE ANESTHETIST, CERTIFIED REGISTERED

## 2020-06-08 PROCEDURE — 2500000003 HC RX 250 WO HCPCS: Performed by: NURSE ANESTHETIST, CERTIFIED REGISTERED

## 2020-06-08 PROCEDURE — 3700000001 HC ADD 15 MINUTES (ANESTHESIA)

## 2020-06-08 PROCEDURE — 6360000002 HC RX W HCPCS

## 2020-06-08 PROCEDURE — 85730 THROMBOPLASTIN TIME PARTIAL: CPT

## 2020-06-08 PROCEDURE — 93312 ECHO TRANSESOPHAGEAL: CPT

## 2020-06-08 PROCEDURE — 83735 ASSAY OF MAGNESIUM: CPT

## 2020-06-08 PROCEDURE — 6360000002 HC RX W HCPCS: Performed by: NURSE ANESTHETIST, CERTIFIED REGISTERED

## 2020-06-08 PROCEDURE — 93312 ECHO TRANSESOPHAGEAL: CPT | Performed by: INTERNAL MEDICINE

## 2020-06-08 PROCEDURE — 92960 CARDIOVERSION ELECTRIC EXT: CPT

## 2020-06-08 PROCEDURE — 3700000000 HC ANESTHESIA ATTENDED CARE

## 2020-06-08 PROCEDURE — 6370000000 HC RX 637 (ALT 250 FOR IP): Performed by: INTERNAL MEDICINE

## 2020-06-08 PROCEDURE — 2709999900 HC NON-CHARGEABLE SUPPLY

## 2020-06-08 PROCEDURE — 84100 ASSAY OF PHOSPHORUS: CPT

## 2020-06-08 PROCEDURE — 80048 BASIC METABOLIC PNL TOTAL CA: CPT

## 2020-06-08 RX ORDER — SODIUM CHLORIDE 9 MG/ML
INJECTION, SOLUTION INTRAVENOUS CONTINUOUS PRN
Status: DISCONTINUED | OUTPATIENT
Start: 2020-06-08 | End: 2020-06-08 | Stop reason: SDUPTHER

## 2020-06-08 RX ORDER — DILTIAZEM HYDROCHLORIDE 360 MG/1
360 CAPSULE, EXTENDED RELEASE ORAL DAILY
Qty: 60 CAPSULE | Refills: 2 | Status: SHIPPED | OUTPATIENT
Start: 2020-06-08 | End: 2020-12-02 | Stop reason: ALTCHOICE

## 2020-06-08 RX ORDER — LIDOCAINE HYDROCHLORIDE 20 MG/ML
INJECTION, SOLUTION INFILTRATION; PERINEURAL PRN
Status: DISCONTINUED | OUTPATIENT
Start: 2020-06-08 | End: 2020-06-08 | Stop reason: SDUPTHER

## 2020-06-08 RX ORDER — PROPOFOL 10 MG/ML
INJECTION, EMULSION INTRAVENOUS PRN
Status: DISCONTINUED | OUTPATIENT
Start: 2020-06-08 | End: 2020-06-08 | Stop reason: SDUPTHER

## 2020-06-08 RX ORDER — DILTIAZEM HYDROCHLORIDE 180 MG/1
360 CAPSULE, COATED, EXTENDED RELEASE ORAL ONCE
Status: COMPLETED | OUTPATIENT
Start: 2020-06-08 | End: 2020-06-08

## 2020-06-08 RX ORDER — AMIODARONE HYDROCHLORIDE 200 MG/1
200 TABLET ORAL DAILY
Status: DISCONTINUED | OUTPATIENT
Start: 2020-06-08 | End: 2020-06-08

## 2020-06-08 RX ADMIN — PROPOFOL 60 MG: 10 INJECTION, EMULSION INTRAVENOUS at 13:27

## 2020-06-08 RX ADMIN — DILTIAZEM HYDROCHLORIDE 360 MG: 180 CAPSULE, COATED, EXTENDED RELEASE ORAL at 15:10

## 2020-06-08 RX ADMIN — PROPOFOL 40 MG: 10 INJECTION, EMULSION INTRAVENOUS at 13:29

## 2020-06-08 RX ADMIN — PROPOFOL 20 MG: 10 INJECTION, EMULSION INTRAVENOUS at 13:31

## 2020-06-08 RX ADMIN — PROPOFOL 20 MG: 10 INJECTION, EMULSION INTRAVENOUS at 13:28

## 2020-06-08 RX ADMIN — SODIUM CHLORIDE: 900 INJECTION INTRAVENOUS at 13:20

## 2020-06-08 RX ADMIN — LIDOCAINE HYDROCHLORIDE 100 MG: 20 INJECTION, SOLUTION INFILTRATION; PERINEURAL at 13:27

## 2020-06-08 ASSESSMENT — PULMONARY FUNCTION TESTS
PIF_VALUE: 0
PIF_VALUE: 1
PIF_VALUE: 0
PIF_VALUE: 1
PIF_VALUE: 0
PIF_VALUE: 0
PIF_VALUE: 1
PIF_VALUE: 0
PIF_VALUE: 1

## 2020-06-08 ASSESSMENT — LIFESTYLE VARIABLES: SMOKING_STATUS: 0

## 2020-06-08 NOTE — PROGRESS NOTES
Discharge instructions reviewed. Questions answered. Belongings gathered and checked with admission list. PIV removed. Site WNL. Patient discharged to home with .

## 2020-06-11 ENCOUNTER — NURSE ONLY (OUTPATIENT)
Dept: CARDIOLOGY CLINIC | Age: 80
End: 2020-06-11
Payer: MEDICARE

## 2020-06-11 ENCOUNTER — TELEPHONE (OUTPATIENT)
Dept: CARDIOLOGY CLINIC | Age: 80
End: 2020-06-11

## 2020-06-11 VITALS
WEIGHT: 216.2 LBS | HEART RATE: 77 BPM | BODY MASS INDEX: 36.91 KG/M2 | SYSTOLIC BLOOD PRESSURE: 130 MMHG | HEIGHT: 64 IN | DIASTOLIC BLOOD PRESSURE: 88 MMHG

## 2020-06-11 PROCEDURE — 93000 ELECTROCARDIOGRAM COMPLETE: CPT | Performed by: NURSE PRACTITIONER

## 2020-06-11 PROCEDURE — 93228 REMOTE 30 DAY ECG REV/REPORT: CPT | Performed by: INTERNAL MEDICINE

## 2020-06-11 PROCEDURE — 99213 OFFICE O/P EST LOW 20 MIN: CPT | Performed by: NURSE PRACTITIONER

## 2020-06-11 ASSESSMENT — ENCOUNTER SYMPTOMS
PHOTOPHOBIA: 0
NAUSEA: 0
SHORTNESS OF BREATH: 1
ABDOMINAL PAIN: 0
CHEST TIGHTNESS: 0
COUGH: 0
EYE PAIN: 0
BLOOD IN STOOL: 0
WHEEZING: 0
VOMITING: 0
COLOR CHANGE: 0
BACK PAIN: 0
DIARRHEA: 0
CONSTIPATION: 0

## 2020-06-11 NOTE — PROGRESS NOTES
[id number] Score for Atrial Fibrillation Stroke Risk   Risk   Factors  Component Value   C CHF No 0   H HTN Yes 1   A2 Age >= 76 Yes,  (81 y.o.) 2   D DM No 0   S2 Prior Stroke/TIA No 0   V Vascular Disease No 0   A Age 74-69 No,  (81 y.o.) 0   Sc Sex female 1    [id number]  Score  4   Score last updated 6/11/20 10:06 AM EDT

## 2020-06-11 NOTE — PROGRESS NOTES
the patient for cardiac assessment and referred here for atrial fibrillation management. Pastmedical history:   Past Medical History:   Diagnosis Date    Asthma     Atrial fibrillation ([facility] [address] 2020    Bronchitis, asthmatic     Chest pain     Atypical    Fibromyalgia     H/O cardiac catheterization 08/02/1991    Patent coronary aterties with preserved left ventricular funciton.  H/O cardiovascular stress test     10/12 Normal EF70%,11/17/09 (Dominick) Normal pattern of perfusion in all regions. Post stress left venticle is normal in size. Normal Study. Normal perfusion in the distribution of all coronaries. Normal LV size and funciton. Rest EF is 70%. Global left ventricular systolic function is normal. Exercise capacity 7 METS. Exercise capacity is normal. 6/98, 6/99, 8/07,     H/O cardiovascular stress test 07/11/2019    Normal study.  H/O complete electrocardiogram     9/1/05 NSR, occ. PVC. 3/14/05, 7/26/02, 7/2/97, 6/5/98, 3/16/98, 2/2/90    H/O Doppler lower venous ultrasound 07/11/2019     Significant reflux noted of the Right CFV. No DVT.  H/O echocardiogram     11/17/09 2D, M-mode, Doppler and color flow Doppler. Left ventricle is normal size. Normal left ventricular wall thickness. Left ventricular systolic function is normal.  EF =>55%. Transmitral spectral Doppler flow pattern is suggestive of impaired LV relaxation. Left ventricular wass motion is normal.  6/98, 5/19/05,    H/O echocardiogram 07/11/2019    EF 45-50%, Mild AR, TR, MR & PHTN.     Hyperlipidemia     Hypertension     Macular degeneration        Surgical history :   Past Surgical History:   Procedure Laterality Date    BLADDER REMOVAL      1973, 1974    BREAST BIOPSY      1971, 72, 76, 76, 78 and 1997, benign    CARDIOVERSION  06/08/2020    KHARI / Cardioversion    CARPAL TUNNEL RELEASE      Right    CATARACT REMOVAL  03/03/2017    Left Eye    FOOT SURGERY  8/09    Left    HYSTERECTOMY      KNEE SURGERY  12/10    Left  OTHER SURGICAL HISTORY      Bone Spurs 1990, 1993       Family history:   Family History   Problem Relation Age of Onset    Cancer Mother     Coronary Art Dis Mother     Other Mother         CABG    Coronary Art Dis Father     Dementia Father     Other Father         CHF, CABG    Stroke Father     Cancer Brother        Social history :  reports that she has never smoked. She has never used smokeless tobacco. She reports current alcohol use of about 1.0 standard drinks of alcohol per week. Allergies   Allergen Reactions    Latex Hives    Amlodipine Nausea Only    Gentamicin     Cozaar [Losartan] Swelling    Amlodipine Besylate Swelling     Muscle pains, swelling, cough    Codeine      Migraines     Contrast [Iodides]     Iodine     Levaquin [Levofloxacin]      Tender Pain     Losartan Potassium     Macrodantin [Nitrofurantoin Macrocrystal]     Metoprolol      Weakness     Nutritional Supplements     Penicillins Hives     Throat Closure     Wasp Venom     Xarelto [Rivaroxaban] Other (See Comments)     States becomes hypertensive; heavy rectal bleeding    Zocor [Simvastatin - High Dose]      Migraines, Muscle Pain        Current Outpatient Medications on File Prior to Visit   Medication Sig Dispense Refill    dilTIAZem (CARDIZEM CD) 360 MG extended release capsule Take 1 capsule by mouth daily 60 capsule 2    atorvastatin (LIPITOR) 10 MG tablet Take 1 tablet by mouth daily 90 tablet 1    apixaban (ELIQUIS) 5 MG TABS tablet Take 1 tablet by mouth 2 times daily 60 tablet 1    Acetaminophen (TYLENOL EXTRA STRENGTH PO) Take by mouth 4 times daily as needed      Probiotic Product (PROBIOTIC DAILY PO) Take by mouth      triamterene-hydrochlorothiazide (MAXZIDE-25) 37.5-25 MG per tablet Take 1 tablet by mouth daily Indications: 1 every 3 days      Calcium Carbonate-Vitamin D (CALTRATE 600+D PO) Take  by mouth daily.          No current facility-administered medications on file prior to

## 2020-06-26 ENCOUNTER — VIRTUAL VISIT (OUTPATIENT)
Dept: CARDIOLOGY CLINIC | Age: 80
End: 2020-06-26
Payer: MEDICARE

## 2020-06-26 PROCEDURE — 99442 PR PHYS/QHP TELEPHONE EVALUATION 11-20 MIN: CPT | Performed by: INTERNAL MEDICINE

## 2020-06-26 ASSESSMENT — ENCOUNTER SYMPTOMS
CHEST TIGHTNESS: 0
COLOR CHANGE: 0
PHOTOPHOBIA: 0
BLOOD IN STOOL: 0
EYE PAIN: 0
BACK PAIN: 0
CONSTIPATION: 0
COUGH: 0
NAUSEA: 0
ABDOMINAL PAIN: 0
VOMITING: 0
WHEEZING: 0
DIARRHEA: 0

## 2020-06-26 NOTE — PROGRESS NOTES
Electrophysiology FU Note        Due to the current efforts to prevent transmission of COVID-19 and also the need to preserve PPE for other caregivers, a face-to-face encounter with the patient was not performed. We performed telephone visit which was initiated by the patient. Physical examination was not performed as this is telephone encounter      Jaclyn Germain is a 70 y.o. female evaluated via telephone on 6/26/2020. Consent:  She and/or health care decision maker is aware that that she may receive a bill for this telephone service, depending on her insurance coverage, and has provided verbal consent to proceed: Yes        Reason for consultation:  Atrial fibrillation    Chief complaint : Tiredness and no energy    Referring physician: Dr. Mark Leon      Primary care physician: Fina Hernandez MD      History of Present Illness: This visit (6/26/2020)      Chief Complaint   Patient presents with    Atrial Fibrillation     Patient was wearing a monitor. States the battery was burning her. She tried to change the battery, but the back up was not working. She spoke to the monitor company and they are sending two new batteries so the patient can resume the monitor. Patient denies chest pain. Patient denies palpitations like before but has at times. Patient is not currently experiencing shortness of breath, but has been. Shortness of breath usually with moderate exertion or with palpitations. Patient has this symptom for some time now. Patient though better since cardioversion but still present. No associated chest pain. Patient reports taking medications as presribed. tiredness is better than before but still present. She feels she is slowly getting better. Patient denies dizziness and swelling. Patient drinks wine, 3 glasses a week at most. Patinet denies caffeine.            Previous visit:    Chief Complaint   Patient presents with    Hypertension Pt here for A-fib. Pt states she gets very anxious and tired all of the time, Pt states she doesn't have energy. Pt denies Chest Pain, Palpitations. SOB, Dizziness, Edema.  Atrial Fibrillation     Patient reports she initially started noted having A. fib while she was in [facility] last year. She was having palpitations at that time and she was also feeling very tired and weak so she went to the hospital there and she was started on Xarelto and heart rate medication. Patient reported that she had bleeding couple of days and she had to go to the hospital again and she was stopped on xarelto and she was told that she may not be a candidate for it. She did not have any work-up for bleeding. She has awaiting GI work-up. Her main complaint is she does not have any energy at all  Feels tired and weak all the time. Palpitations have subsided with medication    She was getting GI work-up when she was noted to be in atrial fibrillation and she was told that she needs cardiac assessment prior to that. Ritu Acsota saw the patient for cardiac assessment and referred here for atrial fibrillation management. Pastmedical history:   Past Medical History:   Diagnosis Date    Asthma     Atrial fibrillation ([facility] [address] 2020    Bronchitis, asthmatic     Chest pain     Atypical    Fibromyalgia     H/O cardiac catheterization 08/02/1991    Patent coronary aterties with preserved left ventricular funciton.  H/O cardiovascular stress test     10/12 Normal EF70%,11/17/09 (Dominick) Normal pattern of perfusion in all regions. Post stress left venticle is normal in size. Normal Study. Normal perfusion in the distribution of all coronaries. Normal LV size and funciton. Rest EF is 70%. Global left ventricular systolic function is normal. Exercise capacity 7 METS. Exercise capacity is normal. 6/98, 6/99, 8/07,     H/O cardiovascular stress test 07/11/2019    Normal study.  H/O complete electrocardiogram     9/1/05 NSR, occ. Metoprolol      Weakness     Nutritional Supplements     Penicillins Hives     Throat Closure     Wasp Venom     Xarelto [Rivaroxaban] Other (See Comments)     States becomes hypertensive; heavy rectal bleeding    Zocor [Simvastatin - High Dose]      Migraines, Muscle Pain        Current Outpatient Medications on File Prior to Visit   Medication Sig Dispense Refill    dilTIAZem (CARDIZEM CD) 360 MG extended release capsule Take 1 capsule by mouth daily 60 capsule 2    atorvastatin (LIPITOR) 10 MG tablet Take 1 tablet by mouth daily (Patient taking differently: Take 10 mg by mouth daily Patient states hurts muscles only take q 3 days) 90 tablet 1    apixaban (ELIQUIS) 5 MG TABS tablet Take 1 tablet by mouth 2 times daily 60 tablet 1    Acetaminophen (TYLENOL EXTRA STRENGTH PO) Take by mouth 4 times daily as needed      Probiotic Product (PROBIOTIC DAILY PO) Take by mouth      triamterene-hydrochlorothiazide (MAXZIDE-25) 37.5-25 MG per tablet Take 1 tablet by mouth daily Indications: 1 every 3 days      Calcium Carbonate-Vitamin D (CALTRATE 600+D PO) Take  by mouth daily. No current facility-administered medications on file prior to visit. Review of Systems:   Review of Systems   Constitutional: Positive for fatigue. Negative for activity change, chills and fever. HENT: Negative for congestion, ear pain and tinnitus. Eyes: Negative for photophobia, pain and visual disturbance. Respiratory: Positive for shortness of breath. Negative for cough, chest tightness and wheezing. Cardiovascular: Positive for palpitations. Negative for chest pain and leg swelling. Gastrointestinal: Negative for abdominal pain, blood in stool, constipation, diarrhea, nausea and vomiting. Endocrine: Negative for cold intolerance and heat intolerance. Genitourinary: Negative for dysuria, flank pain and hematuria. Musculoskeletal: Positive for arthralgias.  Negative for back pain, myalgias and neck stiffness. Skin: Negative for color change and rash. Allergic/Immunologic: Negative for food allergies. Neurological: Negative for dizziness, light-headedness, numbness and headaches. Hematological: Does not bruise/bleed easily. Psychiatric/Behavioral: Negative for agitation, behavioral problems and confusion. Examination:      Patient-Reported Vitals 6/26/2020   Patient-Reported Weight 211   Patient-Reported Height 5' 4\"   Patient-Reported Systolic 799   Patient-Reported Diastolic 76   Patient-Reported Pulse 91   Patient-Reported Temperature -        Physical examination was not performed as this is patient initiated telephone visit. Previous clinic examination as mentioned below    There were no vitals filed for this visit. There is no height or weight on file to calculate BMI. Physical Exam  Constitutional:       Appearance: She is well-developed. HENT:      Head: Normocephalic and atraumatic. Nose: No congestion. Mouth/Throat:      Mouth: Mucous membranes are moist.   Eyes:      Conjunctiva/sclera: Conjunctivae normal.      Pupils: Pupils are equal, round, and reactive to light. Neck:      Musculoskeletal: Normal range of motion. Thyroid: No thyromegaly. Vascular: No JVD. Cardiovascular:      Rate and Rhythm: Normal rate. Rhythm irregular. Heart sounds: Murmur (grade 2/6 systolic and diastolic murmur) present. No friction rub. Pulmonary:      Effort: Pulmonary effort is normal. No respiratory distress. Breath sounds: Normal breath sounds. No stridor. No wheezing. Abdominal:      General: Bowel sounds are normal. There is no distension. Palpations: Abdomen is soft. Tenderness: There is no abdominal tenderness. Musculoskeletal: Normal range of motion. General: No tenderness. Skin:     General: Skin is warm and dry. Findings: No erythema. Neurological:      General: No focal deficit present.       Mental Status: She is alert and oriented to person, place, and time. Cranial Nerves: No cranial nerve deficit. Psychiatric:         Behavior: Behavior normal.               CBC:   Lab Results   Component Value Date    WBC 7.2 06/08/2020    HGB 14.6 06/08/2020    HCT 44.8 06/08/2020     06/08/2020     Lipids:  Lab Results   Component Value Date    CHOL 164 05/09/2019    TRIG 100 05/09/2019    HDL 60 05/09/2019    LDLCALC 84 05/09/2019    LDLDIRECT 93 11/15/2017     PT/INR:   Lab Results   Component Value Date    INR 1.20 06/08/2020        BMP:    Lab Results   Component Value Date     06/08/2020    K 4.6 06/08/2020     06/08/2020    CO2 25 06/08/2020    BUN 18 06/08/2020     CMP:   Lab Results   Component Value Date    AST 14 01/31/2020    PROT 6.7 11/15/2017    BILITOT 0.4 01/31/2020    ALKPHOS 84 01/31/2020     TSH:    Lab Results   Component Value Date    TSH 1.88 01/31/2020       EKGINTERPRETATION - EKG Interpretation:  Fine atrial fibrillation with controlled rate      IMPRESSION / RECOMMENDATIONS:     Persistent atrial fibrillation sp cardioversion  HLD  HTN  Obesity BMI 36    Patient here for follow-up post cardioversion  Qtc was not amenable for antiarrhythmic post cardioversion  On event monitor patient is having PAF episodes  Discussed about ablation  And wants to proceed with ablation  Discussed risk with the procedure in detail and success rate of the ablation      Documentation:  I communicated with the patient and/or health care decision maker about atrial fibrillation management. Details of this discussion including any medical advice provided: atrial fibrillation management      I affirm this is a Patient Initiated Episode with a Patient who has not had a related appointment within my department in the past 7 days or scheduled within the next 24 hours.     Patient identification was verified at the start of the visit: Yes    Total Time: minutes: 11-20 minutes    Note: not billable if this call serves to triage the patient into an appointment for the relevant concern      Mahendra Lopes          Thanks again for allowing me to participate in care of this patient. Please call me if you have any questions. With best regards. Collin Vu MD, 6/26/2020 8:47 AM         Please note this report has been partially produced using speech recognition software and may contain errors related to that system including errors in grammar, punctuation, and spelling, as well as words and phrases that may be inappropriate. If there are any questions or concerns please feel free to contact the dictating provider for clarification.

## 2020-07-20 ASSESSMENT — ENCOUNTER SYMPTOMS: SHORTNESS OF BREATH: 1

## 2020-07-21 ENCOUNTER — TELEPHONE (OUTPATIENT)
Dept: CARDIOLOGY CLINIC | Age: 80
End: 2020-07-21

## 2020-07-24 ENCOUNTER — HOSPITAL ENCOUNTER (OUTPATIENT)
Age: 80
Discharge: HOME | End: 2020-07-24
Payer: MEDICARE

## 2020-07-24 ENCOUNTER — TELEPHONE (OUTPATIENT)
Dept: CARDIOLOGY CLINIC | Age: 80
End: 2020-07-24

## 2020-07-24 ENCOUNTER — HOSPITAL ENCOUNTER (OUTPATIENT)
Dept: LAB | Age: 80
Discharge: HOME | End: 2020-07-24
Payer: MEDICARE

## 2020-07-24 ENCOUNTER — HOSPITAL ENCOUNTER (OUTPATIENT)
Dept: GENERAL RADIOLOGY | Age: 80
Discharge: HOME | End: 2020-07-24
Payer: MEDICARE

## 2020-07-24 LAB
ANION GAP SERPL CALCULATED.3IONS-SCNC: 11 MMOL/L (ref 4–16)
APTT: 31.9 SECONDS (ref 25.1–37.1)
BUN BLDV-MCNC: 18 MG/DL (ref 6–23)
CALCIUM SERPL-MCNC: 9.3 MG/DL (ref 8.3–10.6)
CHLORIDE BLD-SCNC: 102 MMOL/L (ref 99–110)
CO2: 28 MMOL/L (ref 21–32)
CREAT SERPL-MCNC: 0.9 MG/DL (ref 0.6–1.1)
GFR AFRICAN AMERICAN: >60 ML/MIN/1.73M2
GFR NON-AFRICAN AMERICAN: >60 ML/MIN/1.73M2
GLUCOSE BLD-MCNC: 85 MG/DL (ref 70–99)
HCT VFR BLD CALC: 49.4 % (ref 37–47)
HEMOGLOBIN: 16 GM/DL (ref 12.5–16)
INR BLD: 1.1 INDEX
MAGNESIUM: 2.4 MG/DL (ref 1.8–2.4)
MCH RBC QN AUTO: 30.8 PG (ref 27–31)
MCHC RBC AUTO-ENTMCNC: 32.4 % (ref 32–36)
MCV RBC AUTO: 95.2 FL (ref 78–100)
PDW BLD-RTO: 13.5 % (ref 11.7–14.9)
PHOSPHORUS: 3.4 MG/DL (ref 2.5–4.9)
PLATELET # BLD: 242 K/CU MM (ref 140–440)
PMV BLD AUTO: 12 FL (ref 7.5–11.1)
POTASSIUM SERPL-SCNC: 4.4 MMOL/L (ref 3.5–5.1)
PROTHROMBIN TIME: 13.3 SECONDS (ref 11.7–14.5)
RBC # BLD: 5.19 M/CU MM (ref 4.2–5.4)
SODIUM BLD-SCNC: 141 MMOL/L (ref 135–145)
WBC # BLD: 8.3 K/CU MM (ref 4–10.5)

## 2020-07-24 PROCEDURE — 83735 ASSAY OF MAGNESIUM: CPT

## 2020-07-24 PROCEDURE — U0002 COVID-19 LAB TEST NON-CDC: HCPCS

## 2020-07-24 PROCEDURE — 71046 X-RAY EXAM CHEST 2 VIEWS: CPT

## 2020-07-24 PROCEDURE — 85027 COMPLETE CBC AUTOMATED: CPT

## 2020-07-24 PROCEDURE — 85610 PROTHROMBIN TIME: CPT

## 2020-07-24 PROCEDURE — 84100 ASSAY OF PHOSPHORUS: CPT

## 2020-07-24 PROCEDURE — 36415 COLL VENOUS BLD VENIPUNCTURE: CPT

## 2020-07-24 PROCEDURE — 80048 BASIC METABOLIC PNL TOTAL CA: CPT

## 2020-07-24 PROCEDURE — 85730 THROMBOPLASTIN TIME PARTIAL: CPT

## 2020-07-24 NOTE — TELEPHONE ENCOUNTER
Called patient to inform her of new COVID test for today at 420. Spoke to patients  and explain no protocol for COVID test. He voiced understanding and will have her get all pre testing done today as well.

## 2020-07-26 LAB
SARS-COV-2: NOT DETECTED
SOURCE: NORMAL

## 2020-07-29 ENCOUNTER — ANESTHESIA EVENT (OUTPATIENT)
Dept: CARDIAC CATH/INVASIVE PROCEDURES | Age: 80
End: 2020-07-29
Payer: MEDICARE

## 2020-07-29 NOTE — ANESTHESIA PRE PROCEDURE
electrocardiogram     9/1/05 NSR, occ. PVC. 3/14/05, 7/26/02, 7/2/97, 6/5/98, 3/16/98, 2/2/90    H/O Doppler lower venous ultrasound 07/11/2019     Significant reflux noted of the Right CFV. No DVT.  H/O echocardiogram     11/17/09 2D, M-mode, Doppler and color flow Doppler. Left ventricle is normal size. Normal left ventricular wall thickness. Left ventricular systolic function is normal.  EF =>55%. Transmitral spectral Doppler flow pattern is suggestive of impaired LV relaxation. Left ventricular wass motion is normal.  6/98, 5/19/05,    H/O echocardiogram 07/11/2019    EF 45-50%, Mild AR, TR, MR & PHTN.  History of cardiac monitoring 06/12/2020    30 day monitor: [address], [address] HR 84, Max  bpm, A-fib with intermittent mild RVR, Dizziness, SOB, tired & weakness correlated w/A-fib. Scheduled for A fib ablation 7/30    Hyperlipidemia     Hypertension     Macular degeneration        Past Surgical History:        Procedure Laterality Date    BLADDER REMOVAL      1973, 1974    BREAST BIOPSY      1971, 72, 76, 76, 78 and 1997, benign    CARDIOVERSION  06/08/2020    KHARI / Cardioversion    CARPAL TUNNEL RELEASE      Right    CATARACT REMOVAL  03/03/2017    Left Eye    FOOT SURGERY  8/09    Left    HYSTERECTOMY      KNEE SURGERY  12/10    Left    OTHER SURGICAL HISTORY      Bone Spurs 1990, 1993       Social History:    Social History     Tobacco Use    Smoking status: Never Smoker    Smokeless tobacco: Never Used   Substance Use Topics    Alcohol use: Yes     Alcohol/week: 1.0 standard drinks     Types: 1 Glasses of wine per week     Comment: Rarely alcohol, 1 decaf coffee daily                                Counseling given: Not Answered      Vital Signs (Current): There were no vitals filed for this visit.                                            BP Readings from Last 3 Encounters:   06/11/20 130/88   06/08/20 137/67   06/08/20 (!) 140/74       NPO Status: Neuro/Psych:   (+) neuromuscular disease:,             GI/Hepatic/Renal:             Endo/Other:                     Abdominal:   (+) obese,         Vascular:                                      Anesthesia Plan      general     ASA 3       Induction: intravenous. arterial line  MIPS: Postoperative opioids intended. Anesthetic plan and risks discussed with patient. Use of blood products discussed with patient whom consented to blood products. Plan discussed with CRNA.     Attending anesthesiologist reviewed and agrees with Pre Eval content            LIZ Neely - OCTAVIO   7/29/2020

## 2020-07-30 ENCOUNTER — ANESTHESIA (OUTPATIENT)
Dept: CARDIAC CATH/INVASIVE PROCEDURES | Age: 80
End: 2020-07-30
Payer: MEDICARE

## 2020-07-30 ENCOUNTER — HOSPITAL ENCOUNTER (OUTPATIENT)
Dept: CARDIAC CATH/INVASIVE PROCEDURES | Age: 80
Discharge: HOME | End: 2020-07-31
Attending: INTERNAL MEDICINE | Admitting: INTERNAL MEDICINE
Payer: MEDICARE

## 2020-07-30 ENCOUNTER — APPOINTMENT (OUTPATIENT)
Dept: GENERAL RADIOLOGY | Age: 80
End: 2020-07-30
Attending: INTERNAL MEDICINE
Payer: MEDICARE

## 2020-07-30 VITALS
RESPIRATION RATE: 6 BRPM | TEMPERATURE: 97.4 F | OXYGEN SATURATION: 97 % | DIASTOLIC BLOOD PRESSURE: 60 MMHG | SYSTOLIC BLOOD PRESSURE: 116 MMHG

## 2020-07-30 PROBLEM — Z86.79 S/P ABLATION OF ATRIAL FIBRILLATION: Status: ACTIVE | Noted: 2020-07-30

## 2020-07-30 PROBLEM — Z98.890 S/P ABLATION OF ATRIAL FIBRILLATION: Status: ACTIVE | Noted: 2020-07-30

## 2020-07-30 LAB
ABO/RH: NORMAL
ACTIVATED CLOTTING TIME, LOW RANGE: 142 SEC
ACTIVATED CLOTTING TIME, LOW RANGE: 150 SEC
ACTIVATED CLOTTING TIME, LOW RANGE: 264 SEC
ACTIVATED CLOTTING TIME, LOW RANGE: 300 SEC
ACTIVATED CLOTTING TIME, LOW RANGE: 333 SEC
ACTIVATED CLOTTING TIME, LOW RANGE: 388 SEC
ACTIVATED CLOTTING TIME, LOW RANGE: 393 SEC
ACTIVATED CLOTTING TIME, LOW RANGE: 397 SEC
ANTIBODY SCREEN: NEGATIVE
BASE EXCESS MIXED: 2.1 (ref 0–2.3)
BASE EXCESS: ABNORMAL (ref 0–2.4)
CO2: 29 MMOL/L (ref 21–32)
GLUCOSE BLD-MCNC: 125 MG/DL (ref 70–99)
HCO3 ARTERIAL: 27.3 MMOL/L (ref 18–23)
HCT VFR BLD CALC: 44 % (ref 37–47)
HEMOGLOBIN: 15 GM/DL (ref 12.5–16)
O2 SATURATION: 100 % (ref 96–97)
PCO2 ARTERIAL: 43.6 MMHG (ref 32–45)
PH BLOOD: 7.41 (ref 7.34–7.45)
PO2 ARTERIAL: 494.2 MMHG (ref 75–100)
POC CALCIUM: 1.14 MMOL/L (ref 1.12–1.32)
POC CHLORIDE: 109 MMOL/L (ref 98–109)
POC CREATININE: 0.3 MG/DL (ref 0.6–1.1)
POTASSIUM SERPL-SCNC: 3.8 MMOL/L (ref 3.5–4.5)
SODIUM BLD-SCNC: 145 MMOL/L (ref 138–146)
SOURCE, BLOOD GAS: ABNORMAL

## 2020-07-30 PROCEDURE — 93312 ECHO TRANSESOPHAGEAL: CPT

## 2020-07-30 PROCEDURE — 93623 PRGRMD STIMJ&PACG IV RX NFS: CPT | Performed by: INTERNAL MEDICINE

## 2020-07-30 PROCEDURE — 2580000003 HC RX 258: Performed by: NURSE ANESTHETIST, CERTIFIED REGISTERED

## 2020-07-30 PROCEDURE — 93655 ICAR CATH ABLTJ DSCRT ARRHYT: CPT | Performed by: INTERNAL MEDICINE

## 2020-07-30 PROCEDURE — 2720000010 HC SURG SUPPLY STERILE

## 2020-07-30 PROCEDURE — 93657 TX L/R ATRIAL FIB ADDL: CPT

## 2020-07-30 PROCEDURE — 71045 X-RAY EXAM CHEST 1 VIEW: CPT

## 2020-07-30 PROCEDURE — 6370000000 HC RX 637 (ALT 250 FOR IP): Performed by: INTERNAL MEDICINE

## 2020-07-30 PROCEDURE — C1733 CATH, EP, OTHR THAN COOL-TIP: HCPCS

## 2020-07-30 PROCEDURE — C1894 INTRO/SHEATH, NON-LASER: HCPCS

## 2020-07-30 PROCEDURE — 93662 INTRACARDIAC ECG (ICE): CPT | Performed by: INTERNAL MEDICINE

## 2020-07-30 PROCEDURE — 93656 COMPRE EP EVAL ABLTJ ATR FIB: CPT

## 2020-07-30 PROCEDURE — 93325 DOPPLER ECHO COLOR FLOW MAPG: CPT | Performed by: INTERNAL MEDICINE

## 2020-07-30 PROCEDURE — 2500000003 HC RX 250 WO HCPCS

## 2020-07-30 PROCEDURE — 93613 INTRACARDIAC EPHYS 3D MAPG: CPT | Performed by: INTERNAL MEDICINE

## 2020-07-30 PROCEDURE — 2709999900 HC NON-CHARGEABLE SUPPLY

## 2020-07-30 PROCEDURE — 6360000002 HC RX W HCPCS

## 2020-07-30 PROCEDURE — 2580000003 HC RX 258

## 2020-07-30 PROCEDURE — 93657 TX L/R ATRIAL FIB ADDL: CPT | Performed by: INTERNAL MEDICINE

## 2020-07-30 PROCEDURE — 85347 COAGULATION TIME ACTIVATED: CPT

## 2020-07-30 PROCEDURE — 93656 COMPRE EP EVAL ABLTJ ATR FIB: CPT | Performed by: INTERNAL MEDICINE

## 2020-07-30 PROCEDURE — 93312 ECHO TRANSESOPHAGEAL: CPT | Performed by: INTERNAL MEDICINE

## 2020-07-30 PROCEDURE — 2500000003 HC RX 250 WO HCPCS: Performed by: NURSE ANESTHETIST, CERTIFIED REGISTERED

## 2020-07-30 PROCEDURE — 6360000002 HC RX W HCPCS: Performed by: NURSE ANESTHETIST, CERTIFIED REGISTERED

## 2020-07-30 PROCEDURE — 93613 INTRACARDIAC EPHYS 3D MAPG: CPT

## 2020-07-30 PROCEDURE — 86850 RBC ANTIBODY SCREEN: CPT

## 2020-07-30 PROCEDURE — C1753 CATH, INTRAVAS ULTRASOUND: HCPCS

## 2020-07-30 PROCEDURE — 2500000003 HC RX 250 WO HCPCS: Performed by: INTERNAL MEDICINE

## 2020-07-30 PROCEDURE — 86900 BLOOD TYPING SEROLOGIC ABO: CPT

## 2020-07-30 PROCEDURE — 2700000000 HC OXYGEN THERAPY PER DAY

## 2020-07-30 PROCEDURE — 2580000003 HC RX 258: Performed by: INTERNAL MEDICINE

## 2020-07-30 PROCEDURE — 7100000000 HC PACU RECOVERY - FIRST 15 MIN

## 2020-07-30 PROCEDURE — 93655 ICAR CATH ABLTJ DSCRT ARRHYT: CPT

## 2020-07-30 PROCEDURE — 93662 INTRACARDIAC ECG (ICE): CPT

## 2020-07-30 PROCEDURE — 3700000000 HC ANESTHESIA ATTENDED CARE

## 2020-07-30 PROCEDURE — 93005 ELECTROCARDIOGRAM TRACING: CPT | Performed by: INTERNAL MEDICINE

## 2020-07-30 PROCEDURE — 94761 N-INVAS EAR/PLS OXIMETRY MLT: CPT

## 2020-07-30 PROCEDURE — 3700000001 HC ADD 15 MINUTES (ANESTHESIA)

## 2020-07-30 PROCEDURE — 86901 BLOOD TYPING SEROLOGIC RH(D): CPT

## 2020-07-30 PROCEDURE — 7100000001 HC PACU RECOVERY - ADDTL 15 MIN

## 2020-07-30 PROCEDURE — 93308 TTE F-UP OR LMTD: CPT

## 2020-07-30 PROCEDURE — C1732 CATH, EP, DIAG/ABL, 3D/VECT: HCPCS

## 2020-07-30 RX ORDER — PROTAMINE SULFATE 10 MG/ML
INJECTION, SOLUTION INTRAVENOUS PRN
Status: DISCONTINUED | OUTPATIENT
Start: 2020-07-30 | End: 2020-07-30 | Stop reason: SDUPTHER

## 2020-07-30 RX ORDER — EPHEDRINE SULFATE 50 MG/ML
INJECTION INTRAVENOUS PRN
Status: DISCONTINUED | OUTPATIENT
Start: 2020-07-30 | End: 2020-07-30 | Stop reason: SDUPTHER

## 2020-07-30 RX ORDER — ONDANSETRON 2 MG/ML
4 INJECTION INTRAMUSCULAR; INTRAVENOUS
Status: DISCONTINUED | OUTPATIENT
Start: 2020-07-30 | End: 2020-07-30

## 2020-07-30 RX ORDER — SODIUM CHLORIDE 9 MG/ML
INJECTION, SOLUTION INTRAVENOUS CONTINUOUS PRN
Status: DISCONTINUED | OUTPATIENT
Start: 2020-07-30 | End: 2020-07-30 | Stop reason: SDUPTHER

## 2020-07-30 RX ORDER — DILTIAZEM HYDROCHLORIDE 180 MG/1
360 CAPSULE, COATED, EXTENDED RELEASE ORAL DAILY
Status: DISCONTINUED | OUTPATIENT
Start: 2020-07-30 | End: 2020-07-31 | Stop reason: HOSPADM

## 2020-07-30 RX ORDER — FUROSEMIDE 10 MG/ML
INJECTION INTRAMUSCULAR; INTRAVENOUS PRN
Status: DISCONTINUED | OUTPATIENT
Start: 2020-07-30 | End: 2020-07-30 | Stop reason: SDUPTHER

## 2020-07-30 RX ORDER — LABETALOL HYDROCHLORIDE 5 MG/ML
5 INJECTION, SOLUTION INTRAVENOUS EVERY 10 MIN PRN
Status: DISCONTINUED | OUTPATIENT
Start: 2020-07-30 | End: 2020-07-30

## 2020-07-30 RX ORDER — SODIUM CHLORIDE 0.9 % (FLUSH) 0.9 %
10 SYRINGE (ML) INJECTION EVERY 12 HOURS SCHEDULED
Status: DISCONTINUED | OUTPATIENT
Start: 2020-07-30 | End: 2020-07-31 | Stop reason: HOSPADM

## 2020-07-30 RX ORDER — DEXAMETHASONE SODIUM PHOSPHATE 4 MG/ML
INJECTION, SOLUTION INTRA-ARTICULAR; INTRALESIONAL; INTRAMUSCULAR; INTRAVENOUS; SOFT TISSUE PRN
Status: DISCONTINUED | OUTPATIENT
Start: 2020-07-30 | End: 2020-07-30 | Stop reason: SDUPTHER

## 2020-07-30 RX ORDER — MAGNESIUM SULFATE IN WATER 40 MG/ML
2 INJECTION, SOLUTION INTRAVENOUS PRN
Status: DISCONTINUED | OUTPATIENT
Start: 2020-07-30 | End: 2020-07-31 | Stop reason: HOSPADM

## 2020-07-30 RX ORDER — ACETAMINOPHEN 325 MG/1
650 TABLET ORAL EVERY 6 HOURS PRN
Status: DISCONTINUED | OUTPATIENT
Start: 2020-07-30 | End: 2020-07-31 | Stop reason: HOSPADM

## 2020-07-30 RX ORDER — FENTANYL CITRATE 50 UG/ML
25 INJECTION, SOLUTION INTRAMUSCULAR; INTRAVENOUS EVERY 5 MIN PRN
Status: DISCONTINUED | OUTPATIENT
Start: 2020-07-30 | End: 2020-07-30

## 2020-07-30 RX ORDER — FENTANYL CITRATE 50 UG/ML
50 INJECTION, SOLUTION INTRAMUSCULAR; INTRAVENOUS EVERY 5 MIN PRN
Status: DISCONTINUED | OUTPATIENT
Start: 2020-07-30 | End: 2020-07-30

## 2020-07-30 RX ORDER — ROCURONIUM BROMIDE 10 MG/ML
INJECTION, SOLUTION INTRAVENOUS PRN
Status: DISCONTINUED | OUTPATIENT
Start: 2020-07-30 | End: 2020-07-30 | Stop reason: SDUPTHER

## 2020-07-30 RX ORDER — PANTOPRAZOLE SODIUM 40 MG/1
40 TABLET, DELAYED RELEASE ORAL
Status: DISCONTINUED | OUTPATIENT
Start: 2020-07-30 | End: 2020-07-31 | Stop reason: HOSPADM

## 2020-07-30 RX ORDER — HYDRALAZINE HYDROCHLORIDE 20 MG/ML
5 INJECTION INTRAMUSCULAR; INTRAVENOUS EVERY 10 MIN PRN
Status: DISCONTINUED | OUTPATIENT
Start: 2020-07-30 | End: 2020-07-30

## 2020-07-30 RX ORDER — FENTANYL CITRATE 50 UG/ML
INJECTION, SOLUTION INTRAMUSCULAR; INTRAVENOUS PRN
Status: DISCONTINUED | OUTPATIENT
Start: 2020-07-30 | End: 2020-07-30 | Stop reason: SDUPTHER

## 2020-07-30 RX ORDER — ATORVASTATIN CALCIUM 10 MG/1
10 TABLET, FILM COATED ORAL NIGHTLY
Status: DISCONTINUED | OUTPATIENT
Start: 2020-07-30 | End: 2020-07-31 | Stop reason: HOSPADM

## 2020-07-30 RX ORDER — LIDOCAINE HYDROCHLORIDE 20 MG/ML
INJECTION, SOLUTION EPIDURAL; INFILTRATION; INTRACAUDAL; PERINEURAL PRN
Status: DISCONTINUED | OUTPATIENT
Start: 2020-07-30 | End: 2020-07-30 | Stop reason: SDUPTHER

## 2020-07-30 RX ORDER — TRIAMTERENE AND HYDROCHLOROTHIAZIDE 37.5; 25 MG/1; MG/1
1 TABLET ORAL DAILY
Status: DISCONTINUED | OUTPATIENT
Start: 2020-07-30 | End: 2020-07-31 | Stop reason: HOSPADM

## 2020-07-30 RX ORDER — SODIUM CHLORIDE 0.9 % (FLUSH) 0.9 %
10 SYRINGE (ML) INJECTION PRN
Status: DISCONTINUED | OUTPATIENT
Start: 2020-07-30 | End: 2020-07-31 | Stop reason: HOSPADM

## 2020-07-30 RX ORDER — ONDANSETRON 2 MG/ML
INJECTION INTRAMUSCULAR; INTRAVENOUS PRN
Status: DISCONTINUED | OUTPATIENT
Start: 2020-07-30 | End: 2020-07-30 | Stop reason: SDUPTHER

## 2020-07-30 RX ORDER — PROPOFOL 10 MG/ML
INJECTION, EMULSION INTRAVENOUS PRN
Status: DISCONTINUED | OUTPATIENT
Start: 2020-07-30 | End: 2020-07-30 | Stop reason: SDUPTHER

## 2020-07-30 RX ADMIN — LIDOCAINE HYDROCHLORIDE 80 MG: 20 INJECTION, SOLUTION EPIDURAL; INFILTRATION; INTRACAUDAL; PERINEURAL at 07:40

## 2020-07-30 RX ADMIN — APIXABAN 5 MG: 5 TABLET, FILM COATED ORAL at 18:40

## 2020-07-30 RX ADMIN — DILTIAZEM HYDROCHLORIDE 360 MG: 180 CAPSULE, COATED, EXTENDED RELEASE ORAL at 18:35

## 2020-07-30 RX ADMIN — SUGAMMADEX 200 MG: 100 INJECTION, SOLUTION INTRAVENOUS at 11:19

## 2020-07-30 RX ADMIN — PROTAMINE SULFATE 30 MG: 10 INJECTION, SOLUTION INTRAVENOUS at 11:12

## 2020-07-30 RX ADMIN — PHENYLEPHRINE HYDROCHLORIDE 20 MCG/MIN: 10 INJECTION INTRAVENOUS at 08:08

## 2020-07-30 RX ADMIN — PANTOPRAZOLE SODIUM 40 MG: 40 TABLET, DELAYED RELEASE ORAL at 18:35

## 2020-07-30 RX ADMIN — SODIUM CHLORIDE, PRESERVATIVE FREE 10 ML: 5 INJECTION INTRAVENOUS at 21:36

## 2020-07-30 RX ADMIN — TRIAMTERENE AND HYDROCHLOROTHIAZIDE 1 TABLET: 37.5; 25 TABLET ORAL at 18:35

## 2020-07-30 RX ADMIN — SILVER SULFADIAZINE: 10 CREAM TOPICAL at 21:36

## 2020-07-30 RX ADMIN — SILVER SULFADIAZINE: 10 CREAM TOPICAL at 13:16

## 2020-07-30 RX ADMIN — ATORVASTATIN CALCIUM 10 MG: 10 TABLET, FILM COATED ORAL at 21:36

## 2020-07-30 RX ADMIN — ONDANSETRON 4 MG: 2 INJECTION INTRAMUSCULAR; INTRAVENOUS at 11:14

## 2020-07-30 RX ADMIN — PROPOFOL 150 MG: 10 INJECTION, EMULSION INTRAVENOUS at 07:40

## 2020-07-30 RX ADMIN — FUROSEMIDE 10 MG: 10 INJECTION, SOLUTION INTRAVENOUS at 11:17

## 2020-07-30 RX ADMIN — SODIUM CHLORIDE: 900 INJECTION INTRAVENOUS at 07:21

## 2020-07-30 RX ADMIN — ROCURONIUM BROMIDE 50 MG: 10 INJECTION INTRAVENOUS at 07:41

## 2020-07-30 RX ADMIN — DEXAMETHASONE SODIUM PHOSPHATE 4 MG: 4 INJECTION, SOLUTION INTRAMUSCULAR; INTRAVENOUS at 08:08

## 2020-07-30 RX ADMIN — FENTANYL CITRATE 100 MCG: 50 INJECTION INTRAMUSCULAR; INTRAVENOUS at 08:45

## 2020-07-30 RX ADMIN — FENTANYL CITRATE 100 MCG: 50 INJECTION INTRAMUSCULAR; INTRAVENOUS at 07:21

## 2020-07-30 RX ADMIN — EPHEDRINE SULFATE 10 MG: 50 INJECTION, SOLUTION INTRAVENOUS at 08:35

## 2020-07-30 RX ADMIN — ROCURONIUM BROMIDE 10 MG: 10 INJECTION INTRAVENOUS at 09:03

## 2020-07-30 ASSESSMENT — PULMONARY FUNCTION TESTS
PIF_VALUE: 18
PIF_VALUE: 20
PIF_VALUE: 20
PIF_VALUE: 19
PIF_VALUE: 24
PIF_VALUE: 19
PIF_VALUE: 20
PIF_VALUE: 18
PIF_VALUE: 21
PIF_VALUE: 20
PIF_VALUE: 20
PIF_VALUE: 18
PIF_VALUE: 0
PIF_VALUE: 20
PIF_VALUE: 20
PIF_VALUE: 18
PIF_VALUE: 0
PIF_VALUE: 19
PIF_VALUE: 20
PIF_VALUE: 0
PIF_VALUE: 19
PIF_VALUE: 1
PIF_VALUE: 20
PIF_VALUE: 2
PIF_VALUE: 20
PIF_VALUE: 19
PIF_VALUE: 20
PIF_VALUE: 23
PIF_VALUE: 19
PIF_VALUE: 19
PIF_VALUE: 0
PIF_VALUE: 21
PIF_VALUE: 21
PIF_VALUE: 20
PIF_VALUE: 19
PIF_VALUE: 2
PIF_VALUE: 18
PIF_VALUE: 20
PIF_VALUE: 19
PIF_VALUE: 20
PIF_VALUE: 20
PIF_VALUE: 19
PIF_VALUE: 20
PIF_VALUE: 20
PIF_VALUE: 0
PIF_VALUE: 20
PIF_VALUE: 19
PIF_VALUE: 19
PIF_VALUE: 1
PIF_VALUE: 20
PIF_VALUE: 18
PIF_VALUE: 19
PIF_VALUE: 20
PIF_VALUE: 18
PIF_VALUE: 0
PIF_VALUE: 1
PIF_VALUE: 20
PIF_VALUE: 19
PIF_VALUE: 20
PIF_VALUE: 19
PIF_VALUE: 19
PIF_VALUE: 0
PIF_VALUE: 20
PIF_VALUE: 19
PIF_VALUE: 1
PIF_VALUE: 20
PIF_VALUE: 19
PIF_VALUE: 21
PIF_VALUE: 19
PIF_VALUE: 20
PIF_VALUE: 19
PIF_VALUE: 0
PIF_VALUE: 20
PIF_VALUE: 19
PIF_VALUE: 20
PIF_VALUE: 19
PIF_VALUE: 0
PIF_VALUE: 37
PIF_VALUE: 20
PIF_VALUE: 0
PIF_VALUE: 19
PIF_VALUE: 1
PIF_VALUE: 20
PIF_VALUE: 20
PIF_VALUE: 19
PIF_VALUE: 20
PIF_VALUE: 20
PIF_VALUE: 19
PIF_VALUE: 20
PIF_VALUE: 0
PIF_VALUE: 19
PIF_VALUE: 20
PIF_VALUE: 21
PIF_VALUE: 20
PIF_VALUE: 21
PIF_VALUE: 19
PIF_VALUE: 21
PIF_VALUE: 20
PIF_VALUE: 0
PIF_VALUE: 21
PIF_VALUE: 20
PIF_VALUE: 18
PIF_VALUE: 20
PIF_VALUE: 19
PIF_VALUE: 20
PIF_VALUE: 21
PIF_VALUE: 21
PIF_VALUE: 20
PIF_VALUE: 21
PIF_VALUE: 20
PIF_VALUE: 19
PIF_VALUE: 19
PIF_VALUE: 20
PIF_VALUE: 19
PIF_VALUE: 20
PIF_VALUE: 20
PIF_VALUE: 19
PIF_VALUE: 1
PIF_VALUE: 20
PIF_VALUE: 0
PIF_VALUE: 19
PIF_VALUE: 0
PIF_VALUE: 20
PIF_VALUE: 0
PIF_VALUE: 19
PIF_VALUE: 21
PIF_VALUE: 1
PIF_VALUE: 20
PIF_VALUE: 21
PIF_VALUE: 18
PIF_VALUE: 19
PIF_VALUE: 19
PIF_VALUE: 20
PIF_VALUE: 19
PIF_VALUE: 19
PIF_VALUE: 20
PIF_VALUE: 20
PIF_VALUE: 18
PIF_VALUE: 21
PIF_VALUE: 20
PIF_VALUE: 19
PIF_VALUE: 1
PIF_VALUE: 20
PIF_VALUE: 19
PIF_VALUE: 1
PIF_VALUE: 22
PIF_VALUE: 19
PIF_VALUE: 20
PIF_VALUE: 20
PIF_VALUE: 19
PIF_VALUE: 20
PIF_VALUE: 0
PIF_VALUE: 20
PIF_VALUE: 21
PIF_VALUE: 19
PIF_VALUE: 20
PIF_VALUE: 19
PIF_VALUE: 0
PIF_VALUE: 20
PIF_VALUE: 0
PIF_VALUE: 19
PIF_VALUE: 20
PIF_VALUE: 21
PIF_VALUE: 1
PIF_VALUE: 20
PIF_VALUE: 18
PIF_VALUE: 20
PIF_VALUE: 20
PIF_VALUE: 19
PIF_VALUE: 20
PIF_VALUE: 19
PIF_VALUE: 20
PIF_VALUE: 19
PIF_VALUE: 20
PIF_VALUE: 19
PIF_VALUE: 19
PIF_VALUE: 20
PIF_VALUE: 19
PIF_VALUE: 0
PIF_VALUE: 20

## 2020-07-30 ASSESSMENT — ENCOUNTER SYMPTOMS
VOMITING: 0
SHORTNESS OF BREATH: 1
NAUSEA: 0
CONSTIPATION: 0
ABDOMINAL PAIN: 0
PHOTOPHOBIA: 0
DIARRHEA: 0
BACK PAIN: 0
CHEST TIGHTNESS: 0
COLOR CHANGE: 0
BLOOD IN STOOL: 0
EYE PAIN: 0
WHEEZING: 0
COUGH: 0

## 2020-07-30 ASSESSMENT — PAIN SCALES - GENERAL
PAINLEVEL_OUTOF10: 0
PAINLEVEL_OUTOF10: 0

## 2020-07-30 NOTE — H&P
Patient reported that she had bleeding couple of days and she had to go to the hospital again and she was stopped on xarelto and she was told that she may not be a candidate for it. She did not have any work-up for bleeding. She has awaiting GI work-up. Her main complaint is she does not have any energy at all  Feels tired and weak all the time. Palpitations have subsided with medication    She was getting GI work-up when she was noted to be in atrial fibrillation and she was told that she needs cardiac assessment prior to that. Erwin Mishra saw the patient for cardiac assessment and referred here for atrial fibrillation management. Pastmedical history:   Past Medical History:   Diagnosis Date    Asthma     Atrial fibrillation ([facility] 2020    Bronchitis, asthmatic     Chest pain     Atypical    Fibromyalgia     H/O cardiac catheterization 08/02/1991    Patent coronary aterties with preserved left ventricular funciton.  H/O cardiovascular stress test     10/12 Normal EF70%,11/17/09 ([place]) Normal pattern of perfusion in all regions. Post stress left venticle is normal in size. Normal Study. Normal perfusion in the distribution of all coronaries. Normal LV size and funciton. Rest EF is 70%. Global left ventricular systolic function is normal. Exercise capacity 7 METS. Exercise capacity is normal. 6/98, 6/99, 8/07,     H/O cardiovascular stress test 07/11/2019    Normal study.  H/O complete electrocardiogram     9/1/05 NSR, occ. PVC. 3/14/05, 7/26/02, 7/2/97, 6/5/98, 3/16/98, 2/2/90    H/O Doppler lower venous ultrasound 07/11/2019     Significant reflux noted of the Right CFV. No DVT.  H/O echocardiogram     11/17/09 2D, M-mode, Doppler and color flow Doppler. Left ventricle is normal size. Normal left ventricular wall thickness. Left ventricular systolic function is normal.  EF =>55%. Transmitral spectral Doppler flow pattern is suggestive of impaired LV relaxation.  Left ventricular wass motion is normal.  6/98, 5/19/05,    H/O echocardiogram 07/11/2019    EF 45-50%, Mild AR, TR, MR & PHTN.  History of cardiac monitoring 06/12/2020    30 day monitor: [address], [address] HR 84, Max  bpm, A-fib with intermittent mild RVR, Dizziness, SOB, tired & weakness correlated w/A-fib. Scheduled for A fib ablation 7/30    Hyperlipidemia     Hypertension     Macular degeneration        Surgical history :   Past Surgical History:   Procedure Laterality Date    BLADDER REMOVAL      1973, [place], 72, 76, 76, 78 and 1997, benign    CARDIOVERSION  06/08/2020    KHARI / Cardioversion    CARPAL TUNNEL RELEASE      Right    CATARACT REMOVAL  03/03/2017    Left Eye    FOOT SURGERY  8/09    Left    HYSTERECTOMY      KNEE SURGERY  12/10    Left    OTHER SURGICAL HISTORY      Bone Spurs 1990, 1993       Family history:   Family History   Problem Relation Age of Onset    Cancer Mother     Coronary Art Dis Mother     Other Mother         CABG    Coronary Art Dis Father     Dementia Father     Other Father         CHF, CABG    Stroke Father     Cancer Brother        Social history :  reports that she has never smoked. She has never used smokeless tobacco. She reports current alcohol use of about 1.0 standard drinks of alcohol per week. She reports previous drug use.     Allergies   Allergen Reactions    Latex Hives    Amlodipine Nausea Only    Gentamicin     Cozaar [Losartan] Swelling    Amlodipine Besylate Swelling     Muscle pains, swelling, cough    Codeine      Migraines     Contrast [Iodides]     Iodine     Levaquin [Levofloxacin]      Tender Pain     Losartan Potassium     Macrodantin [Nitrofurantoin Macrocrystal]     Metoprolol      Weakness     Nutritional Supplements     Penicillins Hives     Throat Closure     Wasp Venom     Xarelto [Rivaroxaban] Other (See Comments)     States becomes hypertensive; heavy rectal bleeding    Zocor [Simvastatin - High Dose] Migraines, Muscle Pain        No current facility-administered medications on file prior to encounter. Current Outpatient Medications on File Prior to Encounter   Medication Sig Dispense Refill    apixaban (ELIQUIS) 5 MG TABS tablet Take 1 tablet by mouth 2 times daily 60 tablet 1    dilTIAZem (CARDIZEM CD) 360 MG extended release capsule Take 1 capsule by mouth daily 60 capsule 2    atorvastatin (LIPITOR) 10 MG tablet Take 1 tablet by mouth daily (Patient taking differently: Take 10 mg by mouth daily Patient states hurts muscles only take q 3 days) 90 tablet 1    Acetaminophen (TYLENOL EXTRA STRENGTH PO) Take by mouth 4 times daily as needed      Probiotic Product (PROBIOTIC DAILY PO) Take by mouth      triamterene-hydrochlorothiazide (MAXZIDE-25) 37.5-25 MG per tablet Take 1 tablet by mouth daily Indications: 1 every 3 days      Calcium Carbonate-Vitamin D (CALTRATE 600+D PO) Take  by mouth daily. Review of Systems:   Review of Systems   Constitutional: Positive for fatigue. Negative for activity change, chills and fever. HENT: Negative for congestion, ear pain and tinnitus. Eyes: Negative for photophobia, pain and visual disturbance. Respiratory: Positive for shortness of breath. Negative for cough, chest tightness and wheezing. Cardiovascular: Positive for palpitations. Negative for chest pain and leg swelling. Gastrointestinal: Negative for abdominal pain, blood in stool, constipation, diarrhea, nausea and vomiting. Endocrine: Negative for cold intolerance and heat intolerance. Genitourinary: Negative for dysuria, flank pain and hematuria. Musculoskeletal: Positive for arthralgias. Negative for back pain, myalgias and neck stiffness. Skin: Negative for color change and rash. Allergic/Immunologic: Negative for food allergies. Neurological: Negative for dizziness, light-headedness, numbness and headaches. Hematological: Does not bruise/bleed easily. Component Value Date    INR 1.10 07/24/2020        BMP:    Lab Results   Component Value Date     07/24/2020    K 4.4 07/24/2020     07/24/2020    CO2 28 07/24/2020    BUN 18 07/24/2020     CMP:   Lab Results   Component Value Date    AST 14 01/31/2020    PROT 6.7 11/15/2017    BILITOT 0.4 01/31/2020    ALKPHOS 84 01/31/2020     TSH:    Lab Results   Component Value Date    TSH 1.88 01/31/2020       EKGINTERPRETATION - EKG Interpretation:  Fine atrial fibrillation with controlled rate      IMPRESSION / RECOMMENDATIONS:     Persistent atrial fibrillation sp cardioversion  HLD  HTN  Obesity BMI 36    Patient here for follow-up post cardioversion  Qtc was not amenable for antiarrhythmic post cardioversion  On event monitor patient is having PAF episodes  Discussed about ablation  And wants to proceed with ablation  Discussed risk with the procedure in detail and success rate of the ablation  Patient agreeable with the plan    Thanks again for allowing me to participate in care of this patient. Please call me if you have any questions. With best regards. Lauri Mcardle, MD, 7/30/2020 7:37 AM         Please note this report has been partially produced using speech recognition software and may contain errors related to that system including errors in grammar, punctuation, and spelling, as well as words and phrases that may be inappropriate. If there are any questions or concerns please feel free to contact the dictating provider for clarification.

## 2020-07-30 NOTE — OP NOTE
PROCEDURES PERFORMED:    1. Comprehensive electrophysiologic evaluation including transseptal catheterization, insertion and repositioning of multiple electrode catheters with induction or attempted induction of an arrhythmia including left or right atrial pacing/recording when necessary, right ventricular pacing/recording when necessary, and His bundle recording when necessary with intracardiac catheter ablation of atrial fibrillation by pulmonary vein isolation    2. Intracardiac electrophysiologic three-dimensional mapping    3. Transseptal puncture through an intact septum with measurement of RA and LA pressures. 4. Intracardiac echocardiography. 5. Programmed stimulation and pacing after adenosine injections for assessment of PV isolation    6. Additional ablations :   A) Posterior wall isolation    7. Additional SVT ablations  A) Cavotricuspid isthmus Ablation (SVT ablation)      ATTENDING: Nabor Hackett MD.    REFERRING PHYSICIAN:      COMPLICATIONS: None. ESTIMATED BLOOD LOSS: 30 mL. ANESTHESIA: General with intubation    MEDICATIONS USED FOR INDUCTION/TESTING: adenosine    PREOPERATIVE DIAGNOSIS: Persistent Atrial fibrillation    POSTOPERATIVE DIAGNOSIS:  Atrial fibrillation sp Pulmonary vein isolation, Posterior wall isolation with  Posterior floor line and Roof line ablation, Cavotricuspid isthmus ablation. Now in sinus rhythm    INDICATIONS FOR PROCEDURE: Patient with symptomatic persistent atrial fibrillation despite medical therapy here for atrial fibrillation ablation      DETAILS OF PROCEDURE:   The patient was brought to the electrophysiology laboratory in stable condition. The patient was in a fasting, nonsedated state. The risks, benefits and alternatives of the procedure were discussed with the patient and his family.  The risks including, but not limited to, the risks of vascular injury, bleeding, infection, radiation exposure, injury to cardiac and surrounding structures electroanatomical mapping: Using the [employer] CARTO 3 three-dimensional electroanatomical map, a shell map of the left atrium and the pulmonary veins was created by dragging the pentaray and ablation catheter in different areas of the left atrium and in the pulmonary veins. The map was used for aiding the navigation process and to reduce fluoroscopy use. It was also used to guide ablation lesions and to jade those lesions. Trans-septal puncture: Following initial rhythm determination, which demonstrated that the patient was in sinus rhythm, an 0.032 inch J-tipped guidewire was advanced through the 8-[place] sheath in the right femoral vein under fluoroscopic guidance. An SRO sheath and dilator were advanced over the guidewire into the superior vena cava under fluoroscopic guidance. The guidewire was removed. A [place] 1 needle was advanced through the dilator until the tip was slightly behind the tip of the dilator. This system was withdrawn until the apparatus was in contact with the foramen ovale. Transseptal access was obtained. Under fluoroscopic, hemodynamic (LA pressure recording = 15mm) and ultrasound guidance, the left atrium was cannulated and sheath advanced. The dilator and needle were removed. Intracardiac echocardiography demonstrated no acute complications. Heparin was given to the patient to keep ACT at around 350sec through out procedure with ACT checks every 15 minutes. The Pentaray catheter was sequentially positioned in the ostium of each of the pulmonary veins under fluoroscopic, electroanatomic, electrophysiologic and ultrasound guidance and we mapped the left atrium. Examination of the catheter signals demonstrated conduction of electrical activity in all the pulmonary veins (two left pulmonary veins and two right pulmonary veins). Complex fractionated signals were also mapped at the same time in LA and mostly on the posterior wall.      Ablation:     The Smart touch ablation catheter was advanced into position near the right-sided pulmonary veins. A wide circumferential ablation line was performed to encompass the ostia of both the right superior and right inferior pulmonary veins. The settings for ablation were 30-40 bertrand  with a contact force ranging between 3 gm/sec to 40 gm/sec was given per lesion with assessment of signal reduction at the site of ablation, impedance drop, Visitag ablation mapping and also FTI of around 450, and ablation index of upto 550 on anterior wall and posterior wall 450. Esophageal temperature was monitored through out the procedure with movement of the esophageal temperature probe. Max temp was 40 C - at one spot the temp rise was rapid from 37 to 40 despite the ablation was stopped at the rise of 37.1C. Esophagus was shifted along the left side. Point by point ablation was done at this time and we waited till temperature was stabilized prior to the performing another ablation. Ablation lesions were continued until a large circumference ablation line was created, both anterior and posterior to the right-sided pulmonary veins to get a complete entrance block. A wide circumference ablation line was similarly done around the left-sided superior and inferior pulmonary veins - including zion between the pulmonary veins and also ridge between Left superior vein and appendage. We then focused out attention to the posterior wall as complex fractionated signals were noted. Posterior floor and Posterior roof line ablation were performed encompassing most of the CFAEs on posterior wall - connecting right and left pulmonary veins and post ablation posterior wall was silent. Patient still continued to be in atrial fibrillation at this time so we performed cardiversion at  200J and patient immediately cardioverted into sinus rhythm.      Exit and entrance block from the right and left-sided pulmonary veins was then checked and confirmed. Following ablation, we confirmed entrance block at each vessel again. Pacing was also performed from each pole of the pentaray catheter at 10 milliamps, 2 milliseconds to assess for exit block. Veins with insufficient exit block were further ablated until exit block was achieved and entrance block confirmed. EP study: measurments in ms    Baseline cycle length : 198  MI interval : 70  QRS interval : 408    AH : 75  HV : 45    Antegrade Wenckbach cycle length : 380ms  Retrograde Wenckebach cycle length: 450 ms    AVNERP: 600/290ms  AERP: 600/230ms    VAERP: 600/390ms  VERP: 600/250ms      Concentric activation noted antegrade pacing from RA   Concentric activation was noted on retrograde pacing from RV     Adenosine testing    IV adenosine was given per pulmonary veinous side to assess for spontaneous pulmonary vein activity and conduction into atrium if any, Entrance and exit block were confirmed post adenosine on each vein. Pulmonary veins were isolated. Pacing post adenosine IV did not induce any arrhythmia. Following confirmation of pulmonary vein isolation, ablation catheter and sheaths were removed from the left atrium. All catheters were now in the right atrium. Cavotricuspid isthmus ablation      Patient was previously noted to have typical atrial flutter too so we decided to perform cavotricuspid isthmus ablation at the same time. Ramp sheath and Ablation catheter was then was advanced into position along the ventricular septal aspect of the cavotricuspid isthmus under ICE and fluoroscopic guidance. Ablation:  Radiofrequency lesions were delivered in a linear fashion until the isthmus block was achieved. - 35W for 30-45 second per lesion with assessment of signal reduction at the site of ablation,  impedance drop, Visitag ablation mapping and also FTI of around 450, ablation index of 550.  Patient was paced from proximal Coronary sinus ablation and isthmus block was achieve while ablation. Pacing was performed from the lateral right atrium to confirm bidirectional block. This was also confirmed by pace map on CARTO 3. Intracardiac echocardiography was used to document the absence of any significant effusion or any other complication. Catheters were removed under fluoroscopic guidance. Sheaths were pulled and manual compression to achieve hemostasis. There was no bleeding noted from any of the access sites. The patient was extubated and was transported to the recovery area in stable condition. SUMMARY:    1. Successful isolation of the pulmonary veins for ablation of atrial fibrillation,   2. Additional ablation were performed as patient is a persistent atrial fibrillation patient - Posterior wall isolation with Posterior floor line and  Roof line  ablation   3. Additional ablation of cavotricuspid isthmus for typical atrial flutter        RECOMMENDATIONS:  1. Admit to the floor  2. Call Ted Sparks if the patient becomes hypotensive, febrile, unstable or if there is a change in mental status. 3.  Resume anticoagulation as directed. 4. Bed rest x6 hours with legs straight after sheaths are removed. 5. Continue current medications. 6. Follow up in the electrophysiology clinic in three weeks if stable overnight.

## 2020-07-30 NOTE — ANESTHESIA PROCEDURE NOTES
Arterial Line:    An arterial line was placed using surface landmarks, in the OR for the following indication(s): continuous blood pressure monitoring and blood sampling needed. A 22 gauge (size), 1 and 3/4 inch (length), Arrow (type) catheter was placed, Seldinger technique used, into the right radial artery, secured by Tegaderm. Anesthesia type: General    Events:  patient tolerated procedure well with no complications and EBL < 5mL.   7/30/2020 7:55 AM7/30/2020 7:57 AM  Anesthesiologist: Salomon Obrien MD  Resident/CRNA: LIZ Mcclure CRNA  Performed: Anesthesiologist and Resident/CRNA   Preanesthetic Checklist  Completed: patient identified, site marked, surgical consent, pre-op evaluation, timeout performed, IV checked, risks and benefits discussed, monitors and equipment checked, anesthesia consent given, oxygen available and patient being monitored

## 2020-07-30 NOTE — PROGRESS NOTES
1155: Patient arrived to PACU from EP lab. Patient easily arousable. Dressing to right sheaths is clean dry and intact. Svetlana Chandler RN from EP lab at bedside to pull left sheaths. Report given via phone from [facility] and bedside from [facility].   1200: Echo performed at bedside. Js pulling left sheaths, manual pressure being held. 1215: EKG being performed at bedside. 1230: A-line removed. Patient tolerating ice chips at this time. 1245: Phase 1 care complete. Patient placed in PACU hold while waiting for room assignment to be ready. 1317: Dressing applied to back per order. 1345: Patient continues in PACU hold while waiting room assignment [id number] to be cleaned. 1400: Report called to [address] for room [id number] [phone] [id number].   1412: Patient transferred to room [id number] [phone] [id number]. [address] [facility] at bedside.

## 2020-07-30 NOTE — PROCEDURES
Corbin Reveal   70 y.o., female  1940 7/30/2020    Attending: Lang Bonner MD    Sedation : Anesthesia                        Indication:              Pre-Atrial fibrillation ablation                                                                                                                                     After obtaining the informed cosent. Pt brought to EP lab. Sedation per anesthesia . After proper sedation KHARI performed. US Doppler was used to assess abnormalities where appropriate. Post procedure pt is stable. Findings:  LV=  Appears low normal LVEF  LA=  MILD DILATED  MARIZOL= NO CLOTS  RV= normal  RA=  normal  IAS= Normal  Bubble study=not donefor PFO or ASD  AV=Tricuspid, no significant regurgitation or stenosis  MV= mild regurgitation, no stenosis  TV=no regurgitation  PV= no significant regurgitation,   Aorta=mild stable plaque noted  PUL GOMEZ FLOW=systolic blunting noted.  Atrial fibrillation    Impression:  No MARIZOL clot    Plan:  Proceed with ablation

## 2020-07-31 VITALS
OXYGEN SATURATION: 95 % | HEART RATE: 98 BPM | SYSTOLIC BLOOD PRESSURE: 124 MMHG | TEMPERATURE: 97.9 F | BODY MASS INDEX: 37.32 KG/M2 | HEIGHT: 64 IN | RESPIRATION RATE: 20 BRPM | WEIGHT: 218.6 LBS | DIASTOLIC BLOOD PRESSURE: 56 MMHG

## 2020-07-31 LAB
ALBUMIN SERPL-MCNC: 3.7 GM/DL (ref 3.4–5)
ANION GAP SERPL CALCULATED.3IONS-SCNC: 12 MMOL/L (ref 4–16)
BUN BLDV-MCNC: 17 MG/DL (ref 6–23)
CALCIUM SERPL-MCNC: 8.6 MG/DL (ref 8.3–10.6)
CHLORIDE BLD-SCNC: 103 MMOL/L (ref 99–110)
CO2: 23 MMOL/L (ref 21–32)
CREAT SERPL-MCNC: 0.8 MG/DL (ref 0.6–1.1)
GFR AFRICAN AMERICAN: >60 ML/MIN/1.73M2
GFR NON-AFRICAN AMERICAN: >60 ML/MIN/1.73M2
GLUCOSE BLD-MCNC: 133 MG/DL (ref 70–99)
HCT VFR BLD CALC: 43.7 % (ref 37–47)
HEMOGLOBIN: 14.1 GM/DL (ref 12.5–16)
MAGNESIUM: 2.1 MG/DL (ref 1.8–2.4)
MCH RBC QN AUTO: 30.6 PG (ref 27–31)
MCHC RBC AUTO-ENTMCNC: 32.3 % (ref 32–36)
MCV RBC AUTO: 94.8 FL (ref 78–100)
PDW BLD-RTO: 13.8 % (ref 11.7–14.9)
PHOSPHORUS: 3.5 MG/DL (ref 2.5–4.9)
PLATELET # BLD: 226 K/CU MM (ref 140–440)
PMV BLD AUTO: 11.8 FL (ref 7.5–11.1)
POTASSIUM SERPL-SCNC: 4.3 MMOL/L (ref 3.5–5.1)
RBC # BLD: 4.61 M/CU MM (ref 4.2–5.4)
SODIUM BLD-SCNC: 138 MMOL/L (ref 135–145)
WBC # BLD: 14.2 K/CU MM (ref 4–10.5)

## 2020-07-31 PROCEDURE — 85027 COMPLETE CBC AUTOMATED: CPT

## 2020-07-31 PROCEDURE — 2580000003 HC RX 258: Performed by: INTERNAL MEDICINE

## 2020-07-31 PROCEDURE — 6360000002 HC RX W HCPCS: Performed by: INTERNAL MEDICINE

## 2020-07-31 PROCEDURE — 80069 RENAL FUNCTION PANEL: CPT

## 2020-07-31 PROCEDURE — 6370000000 HC RX 637 (ALT 250 FOR IP): Performed by: INTERNAL MEDICINE

## 2020-07-31 PROCEDURE — 36415 COLL VENOUS BLD VENIPUNCTURE: CPT

## 2020-07-31 PROCEDURE — 93010 ELECTROCARDIOGRAM REPORT: CPT | Performed by: INTERNAL MEDICINE

## 2020-07-31 PROCEDURE — 99217 PR OBSERVATION CARE DISCHARGE MANAGEMENT: CPT | Performed by: NURSE PRACTITIONER

## 2020-07-31 PROCEDURE — 83735 ASSAY OF MAGNESIUM: CPT

## 2020-07-31 PROCEDURE — 94761 N-INVAS EAR/PLS OXIMETRY MLT: CPT

## 2020-07-31 RX ORDER — PANTOPRAZOLE SODIUM 40 MG/1
40 TABLET, DELAYED RELEASE ORAL
Qty: 30 TABLET | Refills: 0 | Status: SHIPPED | OUTPATIENT
Start: 2020-07-31 | End: 2021-01-04 | Stop reason: SDUPTHER

## 2020-07-31 RX ORDER — DILTIAZEM HYDROCHLORIDE 360 MG/1
CAPSULE, EXTENDED RELEASE ORAL
Qty: 60 CAPSULE | Refills: 2 | OUTPATIENT
Start: 2020-07-31

## 2020-07-31 RX ORDER — FUROSEMIDE 10 MG/ML
20 INJECTION INTRAMUSCULAR; INTRAVENOUS ONCE
Status: COMPLETED | OUTPATIENT
Start: 2020-07-31 | End: 2020-07-31

## 2020-07-31 RX ORDER — BACITRACIN ZINC AND POLYMYXIN B SULFATE 500; 1000 [USP'U]/G; [USP'U]/G
OINTMENT TOPICAL
Qty: 15 G | Refills: 1 | Status: SHIPPED | OUTPATIENT
Start: 2020-07-31 | End: 2020-08-07

## 2020-07-31 RX ADMIN — FUROSEMIDE 20 MG: 10 INJECTION, SOLUTION INTRAVENOUS at 09:52

## 2020-07-31 RX ADMIN — TRIAMTERENE AND HYDROCHLOROTHIAZIDE 1 TABLET: 37.5; 25 TABLET ORAL at 09:52

## 2020-07-31 RX ADMIN — SILVER SULFADIAZINE: 10 CREAM TOPICAL at 09:53

## 2020-07-31 RX ADMIN — SODIUM CHLORIDE, PRESERVATIVE FREE 10 ML: 5 INJECTION INTRAVENOUS at 09:52

## 2020-07-31 RX ADMIN — PANTOPRAZOLE SODIUM 40 MG: 40 TABLET, DELAYED RELEASE ORAL at 06:36

## 2020-07-31 RX ADMIN — DILTIAZEM HYDROCHLORIDE 360 MG: 180 CAPSULE, COATED, EXTENDED RELEASE ORAL at 09:52

## 2020-07-31 RX ADMIN — APIXABAN 5 MG: 5 TABLET, FILM COATED ORAL at 09:52

## 2020-07-31 ASSESSMENT — PAIN SCALES - GENERAL
PAINLEVEL_OUTOF10: 0
PAINLEVEL_OUTOF10: 0

## 2020-07-31 NOTE — PROGRESS NOTES
Midnight vitals completed, noted patient was on RA with O2SAT of 89-90%. O2 @ 2L placed on patient with Nasal cannula. O 2 SAT >93%. Will continue to monitor patient.

## 2020-07-31 NOTE — PLAN OF CARE
Problem: SAFETY  Goal: Free from accidental physical injury  Outcome: Ongoing  Goal: Free from intentional harm  Outcome: Ongoing     Problem: DAILY CARE  Goal: Daily care needs are met  Outcome: Ongoing     Problem: PAIN  Goal: Patient's pain/discomfort is manageable  Outcome: Ongoing     Problem: SKIN INTEGRITY  Goal: Skin integrity is maintained or improved  Outcome: Ongoing     Problem: KNOWLEDGE DEFICIT  Goal: Patient/S.O. demonstrates understanding of disease process, treatment plan, medications, and discharge instructions. Outcome: Ongoing     Problem: DISCHARGE BARRIERS  Goal: Patient's continuum of care needs are met  Outcome: Ongoing     Problem:  Activity:  Goal: Ability to tolerate increased activity will improve  Description: Ability to tolerate increased activity will improve  Outcome: Ongoing  Goal: Expression of feelings of increased energy will increase  Description: Expression of feelings of increased energy will increase  Outcome: Ongoing     Problem: Cardiac:  Goal: Ability to maintain an adequate cardiac output will improve  Description: Ability to maintain an adequate cardiac output will improve  Outcome: Ongoing  Goal: Complications related to the disease process, condition or treatment will be avoided or minimized  Description: Complications related to the disease process, condition or treatment will be avoided or minimized  Outcome: Ongoing     Problem: Coping:  Goal: Level of anxiety will decrease  Description: Level of anxiety will decrease  Outcome: Ongoing  Goal: General experience of comfort will improve  Description: General experience of comfort will improve  Outcome: Ongoing     Problem: Health Behavior:  Goal: Ability to manage health-related needs will improve  Description: Ability to manage health-related needs will improve  Outcome: Ongoing     Problem: Safety:  Goal: Ability to remain free from injury will improve  Description: Ability to remain free from injury will improve  Outcome: Ongoing  Goal: Will show no signs and symptoms of excessive bleeding  Description: Will show no signs and symptoms of excessive bleeding  Outcome: Ongoing

## 2020-07-31 NOTE — PLAN OF CARE
Problem: SAFETY  Goal: Free from accidental physical injury  7/31/2020 0501 by Kyler Woodward RN  Outcome: Ongoing  7/31/2020 0051 by Kyler Woodward RN  Outcome: Ongoing  Goal: Free from intentional harm  7/31/2020 0501 by Kyler Woodward RN  Outcome: Ongoing  7/31/2020 0051 by Kyler Woodward RN  Outcome: Ongoing     Problem: DAILY CARE  Goal: Daily care needs are met  7/31/2020 0501 by Kyler Woodward RN  Outcome: Ongoing  7/31/2020 0051 by Kyler Woodward RN  Outcome: Ongoing     Problem: PAIN  Goal: Patient's pain/discomfort is manageable  7/31/2020 0501 by Kyler Woodward RN  Outcome: Ongoing  7/31/2020 0051 by Kyler Woodward RN  Outcome: Ongoing     Problem: SKIN INTEGRITY  Goal: Skin integrity is maintained or improved  7/31/2020 0501 by Kyler Woodward RN  Outcome: Ongoing  7/31/2020 0051 by Kyler Woodward RN  Outcome: Ongoing     Problem: KNOWLEDGE DEFICIT  Goal: Patient/S.O. demonstrates understanding of disease process, treatment plan, medications, and discharge instructions. 7/31/2020 0501 by Kyler Woodward RN  Outcome: Ongoing  7/31/2020 0051 by Kyler Woodward RN  Outcome: Ongoing     Problem: DISCHARGE BARRIERS  Goal: Patient's continuum of care needs are met  7/31/2020 0501 by Kyler Woodward RN  Outcome: Ongoing  7/31/2020 0051 by Kyler Woodward RN  Outcome: Ongoing     Problem:  Activity:  Goal: Ability to tolerate increased activity will improve  Description: Ability to tolerate increased activity will improve  7/31/2020 0501 by Kyler Woodward RN  Outcome: Ongoing  7/31/2020 0051 by Kyler Woodward RN  Outcome: Ongoing  Goal: Expression of feelings of increased energy will increase  Description: Expression of feelings of increased energy will increase  7/31/2020 0501 by Kyler Woodward RN  Outcome: Ongoing  7/31/2020 0051 by Kyler Woodward RN  Outcome: Ongoing     Problem: Cardiac:  Goal: Ability to maintain an adequate cardiac output will improve  Description: Ability to maintain an adequate cardiac output will improve  7/31/2020 0501 by Isak Level, RN  Outcome: Ongoing  7/31/2020 0051 by Isak Level, RN  Outcome: Ongoing  Goal: Complications related to the disease process, condition or treatment will be avoided or minimized  Description: Complications related to the disease process, condition or treatment will be avoided or minimized  7/31/2020 0501 by Isak Level, RN  Outcome: Ongoing  7/31/2020 0051 by Isak Level, RN  Outcome: Ongoing     Problem: Coping:  Goal: Level of anxiety will decrease  Description: Level of anxiety will decrease  7/31/2020 0501 by Isak Level, RN  Outcome: Ongoing  7/31/2020 0051 by Isak Level, RN  Outcome: Ongoing  Goal: General experience of comfort will improve  Description: General experience of comfort will improve  7/31/2020 0501 by Isak Level, RN  Outcome: Ongoing  7/31/2020 0051 by Isak Level, RN  Outcome: Ongoing     Problem: Health Behavior:  Goal: Ability to manage health-related needs will improve  Description: Ability to manage health-related needs will improve  7/31/2020 0501 by Isak Level, RN  Outcome: Ongoing  7/31/2020 0051 by Isak Level, RN  Outcome: Ongoing     Problem: Safety:  Goal: Ability to remain free from injury will improve  Description: Ability to remain free from injury will improve  7/31/2020 0501 by Isak Level, RN  Outcome: Ongoing  7/31/2020 0051 by Isak Level, RN  Outcome: Ongoing  Goal: Will show no signs and symptoms of excessive bleeding  Description: Will show no signs and symptoms of excessive bleeding  7/31/2020 0501 by Isak Level, RN  Outcome: Ongoing  7/31/2020 0051 by Isak Level, RN  Outcome: Ongoing

## 2020-07-31 NOTE — DISCHARGE SUMMARY
[facility]  Discharge Summary    Read Paco  :  1940  MRN:  [id number]    Admit date:  2020  Discharge date:  2020    Admitting Physician:  Shaq Ramirez MD    Discharge Diagnoses:      1. Sp Ablation of atrial fibrillation with additional ablation of posterior wall and posterior floor line and roof line  2. 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 Obesity-BMI 37.52      Admission Condition:  fair    Discharged Condition:  good    Hospital Course:   Patient admitted post ablation of atrial flutter and atrial fibrillation for observation. Patient tolerated the procedure and post procedure stay was uneventful. Patient was stable for discharge to be followed as an out patient. Incentive spirometry instruction given and recommended to perform for one week    Patient unfortunately had an ablation burn at the back at the patch site. We have taken precautions like we always do in the ablation case but unfortunately she had a folded skin after laying down and that could have caused the burn. Discussed with patient about the precautions needs to taken. Apply silver sufadiazine cream and also antibiotic cream. We will see her in office with in a week and reassess how it progresses. Medication List      START taking these medications    bacitracin-polymyxin b [id number] UNIT/GM ointment  Commonly known as:  POLYSPORIN  Apply topically daily. pantoprazole 40 MG tablet  Commonly known as:  PROTONIX  Take 1 tablet by mouth 2 times daily (before meals)     silver sulfADIAZINE 1 % cream  Commonly known as:  SILVADENE  Apply topically daily.         CHANGE how you take these medications    atorvastatin 10 MG tablet  Commonly known as:  Lipitor  Take 1 tablet by mouth daily  What changed:  additional instructions        CONTINUE taking these medications    apixaban 5 MG Tabs tablet  Commonly known as:  Eliquis  Take 1 tablet by mouth 2 times daily     CALTRATE 600+D PO     dilTIAZem 360 MG extended release capsule  Commonly known as:  CARDIZEM CD  Take 1 capsule by mouth daily     Maxzide-25 37.5-25 MG per tablet  Generic drug:  triamterene-hydroCHLOROthiazide     PROBIOTIC DAILY PO     TYLENOL EXTRA STRENGTH PO           Where to Get Your Medications      These medications were sent to [facility]/pharmacy #[id number]- [place], [facility] Erin [place] [phone]  [address] [place] [address] 21, [address] [facility]    Phone:  [phone]   · bacitracin-polymyxin b [id number] UNIT/GM ointment  · pantoprazole 40 MG tablet  · silver sulfADIAZINE 1 % cream       Discharge Exam:  BP (!) 124/56   Pulse 98   Temp 97.9 °F (36.6 °C) (Oral)   Resp 20   Ht 5' 4\" (1.626 m)   Wt 218 lb 9.6 oz (99.2 kg)   SpO2 95%   BMI 37.52 kg/m²   General appearance: alert, appears stated age and cooperative  Neck: no adenopathy, no carotid bruit, no JVD and supple, symmetrical, trachea midline  Lungs: clear to auscultation bilaterally  Heart: regular rate and rhythm, S1, S2 normal, no murmur, click, rub or gallop  Pulses: 2+ and symmetric  Neurologic: Grossly normal  right groin site dressing is clean dry and intact, site is soft without hematoma, bruising or bleeding noted. left groin site dressing is clean dry and intact, site is soft without hematoma, bruising or bleeding noted. Back area - has erythema patch for possible ablation burn and a small blister which appears to have happened while removing the patch.  Cream applied and will be followed as an out patient    Disposition:   home    Signed:  Quita Braga  7/31/2020, 4739

## 2020-08-03 ENCOUNTER — TELEPHONE (OUTPATIENT)
Dept: CARDIOLOGY CLINIC | Age: 80
End: 2020-08-03

## 2020-08-03 LAB
EKG ATRIAL RATE: 92 BPM
EKG DIAGNOSIS: NORMAL
EKG P AXIS: 67 DEGREES
EKG P-R INTERVAL: 186 MS
EKG Q-T INTERVAL: 386 MS
EKG QRS DURATION: 76 MS
EKG QTC CALCULATION (BAZETT): 477 MS
EKG R AXIS: 30 DEGREES
EKG T AXIS: 86 DEGREES
EKG VENTRICULAR RATE: 92 BPM

## 2020-08-03 NOTE — TELEPHONE ENCOUNTER
Patient called she needs a follow up ,also   She has several blisters on her back from  The ablation that she has questions about

## 2020-08-07 ENCOUNTER — OFFICE VISIT (OUTPATIENT)
Dept: CARDIOLOGY CLINIC | Age: 80
End: 2020-08-07
Payer: MEDICARE

## 2020-08-07 VITALS
RESPIRATION RATE: 16 BRPM | HEART RATE: 89 BPM | SYSTOLIC BLOOD PRESSURE: 138 MMHG | BODY MASS INDEX: 36.94 KG/M2 | WEIGHT: 215.2 LBS | DIASTOLIC BLOOD PRESSURE: 64 MMHG

## 2020-08-07 PROCEDURE — 1036F TOBACCO NON-USER: CPT | Performed by: NURSE PRACTITIONER

## 2020-08-07 PROCEDURE — 99213 OFFICE O/P EST LOW 20 MIN: CPT | Performed by: NURSE PRACTITIONER

## 2020-08-07 PROCEDURE — 93000 ELECTROCARDIOGRAM COMPLETE: CPT | Performed by: NURSE PRACTITIONER

## 2020-08-07 PROCEDURE — G8400 PT W/DXA NO RESULTS DOC: HCPCS | Performed by: NURSE PRACTITIONER

## 2020-08-07 PROCEDURE — G8417 CALC BMI ABV UP PARAM F/U: HCPCS | Performed by: NURSE PRACTITIONER

## 2020-08-07 PROCEDURE — 1090F PRES/ABSN URINE INCON ASSESS: CPT | Performed by: NURSE PRACTITIONER

## 2020-08-07 PROCEDURE — 4040F PNEUMOC VAC/ADMIN/RCVD: CPT | Performed by: NURSE PRACTITIONER

## 2020-08-07 PROCEDURE — 1123F ACP DISCUSS/DSCN MKR DOCD: CPT | Performed by: NURSE PRACTITIONER

## 2020-08-07 PROCEDURE — G8427 DOCREV CUR MEDS BY ELIG CLIN: HCPCS | Performed by: NURSE PRACTITIONER

## 2020-08-07 RX ORDER — DOXYCYCLINE HYCLATE 100 MG
100 TABLET ORAL DAILY
Qty: 7 TABLET | Refills: 0 | Status: SHIPPED | OUTPATIENT
Start: 2020-08-07 | End: 2020-08-12

## 2020-08-07 NOTE — PROGRESS NOTES
Electrophysiology Follow up    Reason for consultation: atrial fibrillation      Chief complaint :  S/p     Referring physician: Dr Tracie Gandhi     Primary care physician: Hallie Anna MD     History of Present Illness: 8/7/2020   She is concerned about blistering to her left hip area post procedure. She noted 4 blisters to the left hip post procedure- two have broken open. She is dressing them a few times a day. She denies any palpitations or dizziness. She denies any chest pain or SOB. Previous visit on (6/26/2020)             Chief Complaint   Patient presents with    Atrial Fibrillation       Patient was wearing a monitor. States the battery was burning her. She tried to change the battery, but the back up was not working. She spoke to the monitor company and they are sending two new batteries so the patient can resume the monitor. Patient denies chest pain. Patient denies palpitations like before but has at times. Patient is not currently experiencing shortness of breath, but has been. Shortness of breath usually with moderate exertion or with palpitations. Patient has this symptom for some time now. Patient though better since cardioversion but still present. No associated chest pain. Patient reports taking medications as presribed. tiredness is better than before but still present. She feels she is slowly getting better. Patient denies dizziness and swelling. Patient drinks wine, 3 glasses a week at most. Patinet denies caffeine.               Previous visit:          Chief Complaint   Patient presents with    Hypertension       Pt here for A-fib. Pt states she gets very anxious and tired all of the time, Pt states she doesn't have energy. Pt denies Chest Pain, Palpitations. SOB, Dizziness, Edema.  Atrial Fibrillation      Patient reports she initially started noted having A. fib while she was in [facility] last year.   She was having palpitations at that time and she was also feeling spectral Doppler flow pattern is suggestive of impaired LV relaxation. Left ventricular wass motion is normal.  6/98, 5/19/05,    H/O echocardiogram 07/11/2019    EF 45-50%, Mild AR, TR, MR & PHTN.  History of cardiac monitoring 06/12/2020    30 day monitor: [address], [address] HR 84, Max  bpm, A-fib with intermittent mild RVR, Dizziness, SOB, tired & weakness correlated w/A-fib. Scheduled for A fib ablation 7/30    Hyperlipidemia     Hypertension     Macular degeneration      Past Surgical History:   Procedure Laterality Date    ABLATION OF DYSRHYTHMIC FOCUS  07/30/2020    Atrial Fibrillation Ablation & Atrial Flutter Ablation    BLADDER REMOVAL      1973, 1974    BREAST BIOPSY      1971, 72, 76, 76, 78 and 1997, benign    CARDIOVERSION  06/08/2020    KHARI / Cardioversion    CARPAL TUNNEL RELEASE      Right    CATARACT REMOVAL  03/03/2017    Left Eye    FOOT SURGERY  8/09    Left    HYSTERECTOMY      KNEE SURGERY  12/10    Left    OTHER SURGICAL HISTORY      Bone Spurs 1990, 1993     Family History   Problem Relation Age of Onset    Cancer Mother     Coronary Art Dis Mother     Other Mother         CABG    Coronary Art Dis Father     Dementia Father     Other Father         CHF, CABG    Stroke Father     Cancer Brother      Social History     Tobacco Use    Smoking status: Never Smoker    Smokeless tobacco: Never Used   Substance Use Topics    Alcohol use: Yes     Alcohol/week: 1.0 standard drinks     Types: 1 Glasses of wine per week     Comment: Rarely alcohol, 1 decaf coffee daily        Review of Systems:   · Constitutional: No Fever,no unintentional weight Loss   · Eyes: No change in Vision:  Blind in right eye   Decreased vision in left eye  · ENT: No Headaches, Hearing Loss or Vertigo. No tinnitus   · Cardiovascular: as per note above   · Respiratory: No cough or wheezing and as per note above.    · Gastrointestinal: no abdominal pain, no appetite loss, no blood in stools, Psychiatric:  Speech and behavior appropriate     DATA:  No results found for: TROPONINT  BNP:  No results found for: PROBNP  PT/INR:  No results found for: PTINR  No results found for: LABA1C  Lab Results   Component Value Date    CHOL 164 05/09/2019    TRIG 100 05/09/2019    HDL 60 05/09/2019    LDLCALC 84 05/09/2019    LDLDIRECT 93 11/15/2017     Lab Results   Component Value Date    ALT 31 01/31/2020    AST 14 01/31/2020     TSH:   Lab Results   Component Value Date    TSH 1.88 01/31/2020         Impression and recommendations:   Atrial fibrillation   S/p ablation of atrial fibrillation with ablation of posterior wall and posterior floor line and roof line  S/p ablation of cavotricuspid isthmus for typical atrial flutter ablation  Obesity 36.9  Second degree burns     EKG SR rate 89   Degrees and appears to be stable post ablation. She is denying any palpitations. Right groin site is free of hematoma or ecchymosis  Unfortunately she did develop second-degree burns to the left hip/back area. Most likely secondary to patient size and folding of the skin in the area where the grounding pad was placed. Outer aspect of the left hip has a broken blister with serous fluid drainage that area was cleaned up with chlorhexidine and triple antibiotic ointment applied  Most inner burn area has eschar on top area was cleaned with chlorhexidine and triple antibiotic ointment applied. Silver sulfadiazine cream is applied to the surrounding areas then a nonadhesive dressing was applied   Will start patient on doxycycline 100 mg 1 daily x1 week prophylacticly     Areas do not appear to be infected.    We will have her return to clinic in 1 week for further evaluation             LIZ Aguirre CNP on 8/7/2020 at 9:54 AM

## 2020-08-14 ENCOUNTER — OFFICE VISIT (OUTPATIENT)
Dept: CARDIOLOGY CLINIC | Age: 80
End: 2020-08-14
Payer: MEDICARE

## 2020-08-14 VITALS
SYSTOLIC BLOOD PRESSURE: 118 MMHG | HEIGHT: 64 IN | DIASTOLIC BLOOD PRESSURE: 76 MMHG | BODY MASS INDEX: 36.98 KG/M2 | TEMPERATURE: 97 F | HEART RATE: 76 BPM | OXYGEN SATURATION: 98 % | WEIGHT: 216.6 LBS

## 2020-08-14 PROCEDURE — 1090F PRES/ABSN URINE INCON ASSESS: CPT | Performed by: INTERNAL MEDICINE

## 2020-08-14 PROCEDURE — 4040F PNEUMOC VAC/ADMIN/RCVD: CPT | Performed by: INTERNAL MEDICINE

## 2020-08-14 PROCEDURE — 99213 OFFICE O/P EST LOW 20 MIN: CPT | Performed by: INTERNAL MEDICINE

## 2020-08-14 PROCEDURE — G8417 CALC BMI ABV UP PARAM F/U: HCPCS | Performed by: INTERNAL MEDICINE

## 2020-08-14 PROCEDURE — 1123F ACP DISCUSS/DSCN MKR DOCD: CPT | Performed by: INTERNAL MEDICINE

## 2020-08-14 PROCEDURE — 1036F TOBACCO NON-USER: CPT | Performed by: INTERNAL MEDICINE

## 2020-08-14 PROCEDURE — G8400 PT W/DXA NO RESULTS DOC: HCPCS | Performed by: INTERNAL MEDICINE

## 2020-08-14 PROCEDURE — G8427 DOCREV CUR MEDS BY ELIG CLIN: HCPCS | Performed by: INTERNAL MEDICINE

## 2020-08-14 NOTE — PROGRESS NOTES
Electrophysiology Follow up    Reason for consultation: atrial fibrillation      Chief complaint :  Burn on the back    Referring physician: Dr Cielo Junior     Primary care physician: Kylee Simms MD     History of Present Illness: This visit (8/14/2020)      Chief Complaint   Patient presents with    Follow-up     Patient here today for follow up regarding burns on left side following afib ablation. Patient states she feels they are improving, but still rates the pain at an 8/10. States she can't sleep or sit for any length of time. She has a hard time riding in the car. She states she \"can't stand to have it touch anything\". Her  has been changing her dressing 2-3 times daily. States wound is still moist and raw looking. Previous visit:      She is concerned about blistering to her left hip area post procedure. She noted 4 blisters to the left hip post procedure- two have broken open. She is dressing them a few times a day. She denies any palpitations or dizziness. She denies any chest pain or SOB. Previous visit on (6/26/2020)             Chief Complaint   Patient presents with    Atrial Fibrillation       Patient was wearing a monitor. States the battery was burning her. She tried to change the battery, but the back up was not working. She spoke to the monitor company and they are sending two new batteries so the patient can resume the monitor. Patient denies chest pain. Patient denies palpitations like before but has at times. Patient is not currently experiencing shortness of breath, but has been. Shortness of breath usually with moderate exertion or with palpitations. Patient has this symptom for some time now. Patient though better since cardioversion but still present. No associated chest pain. Patient reports taking medications as presribed. tiredness is better than before but still present. She feels she is slowly getting better. Patient denies dizziness and swelling. Patient drinks wine, 3 glasses a week at most. [place] denies caffeine.               Previous visit:          Chief Complaint   Patient presents with    Hypertension       Pt here for A-fib. Pt states she gets very anxious and tired all of the time, Pt states she doesn't have energy. Pt denies Chest Pain, Palpitations. SOB, Dizziness, Edema.  Atrial Fibrillation      Patient reports she initially started noted having A. fib while she was in [facility] last year. She was having palpitations at that time and she was also feeling very tired and weak so she went to the hospital there and she was started on Xarelto and heart rate medication. Patient reported that she had bleeding couple of days and she had to go to the hospital again and she was stopped on xarelto and she was told that she may not be a candidate for it.     She did not have any work-up for bleeding. She has awaiting GI work-up. Her main complaint is she does not have any energy at all  Feels tired and weak all the time. Palpitations have subsided with medication     She was getting GI work-up when she was noted to be in atrial fibrillation and she was told that she needs cardiac assessment prior to that. Veola Leyden saw the patient for cardiac assessment and referred here for atrial fibrillation management. Past Medical History:   Diagnosis Date    Asthma     Atrial fibrillation ([address] 2020    Bronchitis, asthmatic     Chest pain     Atypical    Fibromyalgia     H/O cardiac catheterization 08/02/1991    Patent coronary aterties with preserved left ventricular funciton.  H/O cardiovascular stress test     10/12 Normal EF70%,11/17/09 ([place]) Normal pattern of perfusion in all regions. Post stress left venticle is normal in size. Normal Study. Normal perfusion in the distribution of all coronaries. Normal LV size and funciton. Rest EF is 70%. Global left ventricular systolic function is normal. Exercise capacity 7 METS.  Exercise Alcohol/week: 1.0 standard drinks     Types: 1 Glasses of wine per week     Comment: Rarely alcohol, 1 decaf coffee daily        Review of Systems:   · Constitutional: No Fever,no unintentional weight Loss   · Eyes: No change in Vision:  Blind in right eye   Decreased vision in left eye  · ENT: No Headaches, Hearing Loss or Vertigo. No tinnitus   · Cardiovascular: as per note above   · Respiratory: No cough or wheezing and as per note above. · Gastrointestinal: no abdominal pain, no appetite loss, no blood in stools, constipation, or diarrhea, No heartburn  · Genitourinary: No dysuria, trouble voiding, or hematuria  · Musculoskeletal: back pain Yes: myalgia No: arthralgia Yes  · Integumentary  Blisters to the left hip area, area of eschar noted but improving redness and healing with secondary intention  · Neurological: No TIA or stroke symptoms  · Psychiatric: anxiety Yes:  depression No    · Endocrine: No malaise, Yes fatigue no temperature intolerance  · Hematologic/Lymphatic: No bleeding problems, blood clots or swollen lymph nodes  · Allergic/Immunologic: No nasal congestion or hives        /76   Pulse 76   Temp 97 °F (36.1 °C)   Ht 5' 4\" (1.626 m)   Wt 216 lb 9.6 oz (98.2 kg)   SpO2 98%   BMI 37.18 kg/m²   Wt Readings from Last 3 Encounters:   08/24/20 213 lb 11.2 oz (96.9 kg)   08/21/20 214 lb 6.4 oz (97.3 kg)   08/14/20 216 lb 9.6 oz (98.2 kg)       Physical exam:   General Appearance:  No distress, conversant  Constitutional:  Well developed, Well nourished, No acute distress, Non-toxic appearance. HENT:  Normocephalic, Atraumatic, Bilateral external ears normal, Oropharynx moist, No oral exudates, Nose normal. Neck- Normal range of motion, No tenderness, Supple, No stridor,no apical-carotid delay  Eyes:  PERRL, EOMI, Conjunctiva normal, No discharge. Respiratory:  Normal breath sounds, No respiratory distress, No wheezing, No chest tenderness. ,no use of accessory muscles, NO

## 2020-08-21 ENCOUNTER — OFFICE VISIT (OUTPATIENT)
Dept: CARDIOLOGY CLINIC | Age: 80
End: 2020-08-21
Payer: MEDICARE

## 2020-08-21 VITALS
HEART RATE: 84 BPM | BODY MASS INDEX: 36.6 KG/M2 | SYSTOLIC BLOOD PRESSURE: 126 MMHG | HEIGHT: 64 IN | DIASTOLIC BLOOD PRESSURE: 80 MMHG | WEIGHT: 214.4 LBS

## 2020-08-21 PROCEDURE — G8417 CALC BMI ABV UP PARAM F/U: HCPCS | Performed by: INTERNAL MEDICINE

## 2020-08-21 PROCEDURE — 99213 OFFICE O/P EST LOW 20 MIN: CPT | Performed by: INTERNAL MEDICINE

## 2020-08-21 PROCEDURE — G8400 PT W/DXA NO RESULTS DOC: HCPCS | Performed by: INTERNAL MEDICINE

## 2020-08-21 PROCEDURE — 1036F TOBACCO NON-USER: CPT | Performed by: INTERNAL MEDICINE

## 2020-08-21 PROCEDURE — G8427 DOCREV CUR MEDS BY ELIG CLIN: HCPCS | Performed by: INTERNAL MEDICINE

## 2020-08-21 PROCEDURE — 1090F PRES/ABSN URINE INCON ASSESS: CPT | Performed by: INTERNAL MEDICINE

## 2020-08-21 PROCEDURE — 4040F PNEUMOC VAC/ADMIN/RCVD: CPT | Performed by: INTERNAL MEDICINE

## 2020-08-21 PROCEDURE — 1123F ACP DISCUSS/DSCN MKR DOCD: CPT | Performed by: INTERNAL MEDICINE

## 2020-08-21 RX ORDER — TORSEMIDE 20 MG/1
20 TABLET ORAL PRN
Qty: 30 TABLET | Refills: 0 | Status: SHIPPED | OUTPATIENT
Start: 2020-08-21 | End: 2020-09-14

## 2020-08-21 RX ORDER — POTASSIUM CHLORIDE 750 MG/1
10 TABLET, EXTENDED RELEASE ORAL DAILY
Qty: 30 TABLET | Refills: 0 | Status: SHIPPED | OUTPATIENT
Start: 2020-08-21 | End: 2020-09-14
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
## 2020-08-24 ENCOUNTER — OFFICE VISIT (OUTPATIENT)
Dept: SURGERY | Age: 80
End: 2020-08-24
Payer: MEDICARE

## 2020-08-24 VITALS
HEART RATE: 89 BPM | TEMPERATURE: 97.7 F | WEIGHT: 213.7 LBS | SYSTOLIC BLOOD PRESSURE: 142 MMHG | OXYGEN SATURATION: 95 % | BODY MASS INDEX: 36.48 KG/M2 | HEIGHT: 64 IN | DIASTOLIC BLOOD PRESSURE: 90 MMHG

## 2020-08-24 PROCEDURE — 99203 OFFICE O/P NEW LOW 30 MIN: CPT | Performed by: SURGERY

## 2020-08-24 PROCEDURE — 4040F PNEUMOC VAC/ADMIN/RCVD: CPT | Performed by: SURGERY

## 2020-08-24 PROCEDURE — G8417 CALC BMI ABV UP PARAM F/U: HCPCS | Performed by: SURGERY

## 2020-08-24 PROCEDURE — 11042 DBRDMT SUBQ TIS 1ST 20SQCM/<: CPT | Performed by: SURGERY

## 2020-08-24 PROCEDURE — 1036F TOBACCO NON-USER: CPT | Performed by: SURGERY

## 2020-08-24 PROCEDURE — 1090F PRES/ABSN URINE INCON ASSESS: CPT | Performed by: SURGERY

## 2020-08-24 PROCEDURE — 1123F ACP DISCUSS/DSCN MKR DOCD: CPT | Performed by: SURGERY

## 2020-08-24 PROCEDURE — G8400 PT W/DXA NO RESULTS DOC: HCPCS | Performed by: SURGERY

## 2020-08-24 PROCEDURE — G8427 DOCREV CUR MEDS BY ELIG CLIN: HCPCS | Performed by: SURGERY
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
## 2020-08-27 ASSESSMENT — ENCOUNTER SYMPTOMS
EYE REDNESS: 0
RECTAL PAIN: 0
CONSTIPATION: 0
CHOKING: 0
BACK PAIN: 0
STRIDOR: 0
SORE THROAT: 0
PHOTOPHOBIA: 0
COLOR CHANGE: 0
EYE ITCHING: 0
APNEA: 0
ANAL BLEEDING: 0

## 2020-09-03 ENCOUNTER — OFFICE VISIT (OUTPATIENT)
Dept: SURGERY | Age: 80
End: 2020-09-03
Payer: MEDICARE

## 2020-09-03 VITALS
SYSTOLIC BLOOD PRESSURE: 132 MMHG | DIASTOLIC BLOOD PRESSURE: 84 MMHG | HEART RATE: 102 BPM | HEIGHT: 64 IN | OXYGEN SATURATION: 97 % | BODY MASS INDEX: 36.28 KG/M2 | TEMPERATURE: 98.6 F | RESPIRATION RATE: 14 BRPM | WEIGHT: 212.5 LBS

## 2020-09-03 PROCEDURE — G8417 CALC BMI ABV UP PARAM F/U: HCPCS | Performed by: SURGERY

## 2020-09-03 PROCEDURE — 4040F PNEUMOC VAC/ADMIN/RCVD: CPT | Performed by: SURGERY

## 2020-09-03 PROCEDURE — 1123F ACP DISCUSS/DSCN MKR DOCD: CPT | Performed by: SURGERY

## 2020-09-03 PROCEDURE — 1036F TOBACCO NON-USER: CPT | Performed by: SURGERY

## 2020-09-03 PROCEDURE — 1090F PRES/ABSN URINE INCON ASSESS: CPT | Performed by: SURGERY

## 2020-09-03 PROCEDURE — 99213 OFFICE O/P EST LOW 20 MIN: CPT | Performed by: SURGERY

## 2020-09-03 PROCEDURE — G8400 PT W/DXA NO RESULTS DOC: HCPCS | Performed by: SURGERY

## 2020-09-03 PROCEDURE — G8427 DOCREV CUR MEDS BY ELIG CLIN: HCPCS | Performed by: SURGERY

## 2020-09-14 ENCOUNTER — VIRTUAL VISIT (OUTPATIENT)
Dept: SURGERY | Age: 80
End: 2020-09-14
Payer: MEDICARE

## 2020-09-14 PROCEDURE — 4040F PNEUMOC VAC/ADMIN/RCVD: CPT | Performed by: SURGERY

## 2020-09-14 PROCEDURE — G8400 PT W/DXA NO RESULTS DOC: HCPCS | Performed by: SURGERY

## 2020-09-14 PROCEDURE — 1036F TOBACCO NON-USER: CPT | Performed by: SURGERY

## 2020-09-14 PROCEDURE — 1090F PRES/ABSN URINE INCON ASSESS: CPT | Performed by: SURGERY

## 2020-09-14 PROCEDURE — G8428 CUR MEDS NOT DOCUMENT: HCPCS | Performed by: SURGERY

## 2020-09-14 PROCEDURE — 99213 OFFICE O/P EST LOW 20 MIN: CPT | Performed by: SURGERY

## 2020-09-14 PROCEDURE — 1123F ACP DISCUSS/DSCN MKR DOCD: CPT | Performed by: SURGERY

## 2020-09-14 PROCEDURE — G8417 CALC BMI ABV UP PARAM F/U: HCPCS | Performed by: SURGERY

## 2020-09-14 RX ORDER — POTASSIUM CHLORIDE 750 MG/1
TABLET, EXTENDED RELEASE ORAL
Qty: 30 TABLET | Refills: 0 | Status: SHIPPED | OUTPATIENT
Start: 2020-09-14 | End: 2020-10-28

## 2020-09-14 RX ORDER — TORSEMIDE 20 MG/1
20 TABLET ORAL PRN
Qty: 30 TABLET | Refills: 0 | Status: SHIPPED | OUTPATIENT
Start: 2020-09-14 | End: 2020-10-28

## 2020-09-15 ASSESSMENT — ENCOUNTER SYMPTOMS
SORE THROAT: 0
ANAL BLEEDING: 0
RECTAL PAIN: 0
COLOR CHANGE: 0
BACK PAIN: 0
CHOKING: 0
CONSTIPATION: 0
EYE ITCHING: 0
PHOTOPHOBIA: 0
STRIDOR: 0
EYE REDNESS: 0
APNEA: 0
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
## 2020-09-29 ENCOUNTER — OFFICE VISIT (OUTPATIENT)
Dept: CARDIOLOGY CLINIC | Age: 80
End: 2020-09-29
Payer: MEDICARE

## 2020-09-29 VITALS
RESPIRATION RATE: 16 BRPM | WEIGHT: 217.6 LBS | SYSTOLIC BLOOD PRESSURE: 128 MMHG | HEIGHT: 64 IN | DIASTOLIC BLOOD PRESSURE: 72 MMHG | HEART RATE: 64 BPM | BODY MASS INDEX: 37.15 KG/M2

## 2020-09-29 PROCEDURE — G8417 CALC BMI ABV UP PARAM F/U: HCPCS | Performed by: INTERNAL MEDICINE

## 2020-09-29 PROCEDURE — G8400 PT W/DXA NO RESULTS DOC: HCPCS | Performed by: INTERNAL MEDICINE

## 2020-09-29 PROCEDURE — 1123F ACP DISCUSS/DSCN MKR DOCD: CPT | Performed by: INTERNAL MEDICINE

## 2020-09-29 PROCEDURE — 4040F PNEUMOC VAC/ADMIN/RCVD: CPT | Performed by: INTERNAL MEDICINE

## 2020-09-29 PROCEDURE — 1036F TOBACCO NON-USER: CPT | Performed by: INTERNAL MEDICINE

## 2020-09-29 PROCEDURE — G8427 DOCREV CUR MEDS BY ELIG CLIN: HCPCS | Performed by: INTERNAL MEDICINE

## 2020-09-29 PROCEDURE — 1090F PRES/ABSN URINE INCON ASSESS: CPT | Performed by: INTERNAL MEDICINE

## 2020-09-29 PROCEDURE — 99214 OFFICE O/P EST MOD 30 MIN: CPT | Performed by: INTERNAL MEDICINE
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
## 2020-10-28 RX ORDER — POTASSIUM CHLORIDE 750 MG/1
10 TABLET, EXTENDED RELEASE ORAL DAILY
Qty: 30 TABLET | Refills: 3 | Status: SHIPPED | OUTPATIENT
Start: 2020-10-28 | End: 2021-01-04 | Stop reason: SDUPTHER

## 2020-10-28 RX ORDER — TORSEMIDE 20 MG/1
20 TABLET ORAL PRN
Qty: 30 TABLET | Refills: 3 | Status: SHIPPED | OUTPATIENT
Start: 2020-10-28 | End: 2020-12-02 | Stop reason: SDUPTHER

## 2020-12-02 ENCOUNTER — OFFICE VISIT (OUTPATIENT)
Dept: CARDIOLOGY CLINIC | Age: 80
End: 2020-12-02
Payer: MEDICARE

## 2020-12-02 VITALS
BODY MASS INDEX: 36.37 KG/M2 | TEMPERATURE: 96.9 F | OXYGEN SATURATION: 98 % | HEART RATE: 82 BPM | RESPIRATION RATE: 12 BRPM | WEIGHT: 213 LBS | SYSTOLIC BLOOD PRESSURE: 150 MMHG | HEIGHT: 64 IN | DIASTOLIC BLOOD PRESSURE: 80 MMHG

## 2020-12-02 PROCEDURE — 1090F PRES/ABSN URINE INCON ASSESS: CPT | Performed by: INTERNAL MEDICINE

## 2020-12-02 PROCEDURE — G8484 FLU IMMUNIZE NO ADMIN: HCPCS | Performed by: INTERNAL MEDICINE

## 2020-12-02 PROCEDURE — G8417 CALC BMI ABV UP PARAM F/U: HCPCS | Performed by: INTERNAL MEDICINE

## 2020-12-02 PROCEDURE — G8400 PT W/DXA NO RESULTS DOC: HCPCS | Performed by: INTERNAL MEDICINE

## 2020-12-02 PROCEDURE — 93000 ELECTROCARDIOGRAM COMPLETE: CPT | Performed by: INTERNAL MEDICINE

## 2020-12-02 PROCEDURE — 1036F TOBACCO NON-USER: CPT | Performed by: INTERNAL MEDICINE

## 2020-12-02 PROCEDURE — G8427 DOCREV CUR MEDS BY ELIG CLIN: HCPCS | Performed by: INTERNAL MEDICINE

## 2020-12-02 PROCEDURE — 99213 OFFICE O/P EST LOW 20 MIN: CPT | Performed by: INTERNAL MEDICINE

## 2020-12-02 PROCEDURE — 1123F ACP DISCUSS/DSCN MKR DOCD: CPT | Performed by: INTERNAL MEDICINE

## 2020-12-02 PROCEDURE — 4040F PNEUMOC VAC/ADMIN/RCVD: CPT | Performed by: INTERNAL MEDICINE

## 2020-12-02 RX ORDER — TORSEMIDE 20 MG/1
20 TABLET ORAL PRN
Qty: 60 TABLET | Refills: 0 | Status: SHIPPED | OUTPATIENT
Start: 2020-12-02 | End: 2021-01-04 | Stop reason: SDUPTHER

## 2020-12-02 RX ORDER — DILTIAZEM HYDROCHLORIDE 120 MG/1
120 CAPSULE, EXTENDED RELEASE ORAL 2 TIMES DAILY
Qty: 60 CAPSULE | Refills: 3 | Status: SHIPPED | OUTPATIENT
Start: 2020-12-02 | End: 2021-01-04 | Stop reason: SDUPTHER
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
## 2020-12-30 ENCOUNTER — TELEPHONE (OUTPATIENT)
Dept: CARDIOLOGY CLINIC | Age: 80
End: 2020-12-30

## 2020-12-30 ENCOUNTER — HOSPITAL ENCOUNTER (OUTPATIENT)
Age: 80
Discharge: HOME | End: 2020-12-30
Payer: MEDICARE

## 2020-12-30 LAB
BACTERIA: NEGATIVE /HPF
BASOPHILS ABSOLUTE: 0.1 K/CU MM
BASOPHILS RELATIVE PERCENT: 1.1 % (ref 0–1)
BILIRUBIN URINE: NEGATIVE MG/DL
BLOOD, URINE: ABNORMAL
CLARITY: CLEAR
COLOR: YELLOW
DIFFERENTIAL TYPE: ABNORMAL
EOSINOPHILS ABSOLUTE: 0.1 K/CU MM
EOSINOPHILS RELATIVE PERCENT: 0.9 % (ref 0–3)
GLUCOSE, URINE: NEGATIVE MG/DL
HCT VFR BLD CALC: 45.3 % (ref 37–47)
HEMOGLOBIN: 14.2 GM/DL (ref 12.5–16)
IMMATURE NEUTROPHIL %: 0.4 % (ref 0–0.43)
KETONES, URINE: NEGATIVE MG/DL
LEUKOCYTE ESTERASE, URINE: ABNORMAL
LYMPHOCYTES ABSOLUTE: 1.4 K/CU MM
LYMPHOCYTES RELATIVE PERCENT: 17.1 % (ref 24–44)
MCH RBC QN AUTO: 30.3 PG (ref 27–31)
MCHC RBC AUTO-ENTMCNC: 31.3 % (ref 32–36)
MCV RBC AUTO: 96.6 FL (ref 78–100)
MONOCYTES ABSOLUTE: 0.6 K/CU MM
MONOCYTES RELATIVE PERCENT: 6.8 % (ref 0–4)
MUCUS: ABNORMAL HPF
NITRITE URINE, QUANTITATIVE: NEGATIVE
NUCLEATED RBC %: 0 %
PDW BLD-RTO: 13.7 % (ref 11.7–14.9)
PH, URINE: 5 (ref 5–8)
PLATELET # BLD: 225 K/CU MM (ref 140–440)
PMV BLD AUTO: 12.6 FL (ref 7.5–11.1)
PROTEIN UA: NEGATIVE MG/DL
RBC # BLD: 4.69 M/CU MM (ref 4.2–5.4)
RBC URINE: 84 /HPF (ref 0–6)
SEGMENTED NEUTROPHILS ABSOLUTE COUNT: 6.1 K/CU MM
SEGMENTED NEUTROPHILS RELATIVE PERCENT: 73.7 % (ref 36–66)
SPECIFIC GRAVITY UA: 1.02 (ref 1–1.03)
SQUAMOUS EPITHELIAL: 1 /HPF
TOTAL IMMATURE NEUTOROPHIL: 0.03 K/CU MM
TOTAL NUCLEATED RBC: 0 K/CU MM
TRANSITIONAL EPITHELIAL: 1 /HPF
TRICHOMONAS: ABNORMAL /HPF
UROBILINOGEN, URINE: NORMAL MG/DL (ref 0.2–1)
WBC # BLD: 8.2 K/CU MM (ref 4–10.5)
WBC UA: 17 /HPF (ref 0–5)

## 2020-12-30 PROCEDURE — 85025 COMPLETE CBC W/AUTO DIFF WBC: CPT

## 2020-12-30 PROCEDURE — 36415 COLL VENOUS BLD VENIPUNCTURE: CPT

## 2020-12-30 PROCEDURE — 81001 URINALYSIS AUTO W/SCOPE: CPT

## 2020-12-30 NOTE — TELEPHONE ENCOUNTER
Patient called stated that as of yesterday   She is seeing blood in her urine , she feels   This is from the Eliquis she is taking   She has had a issue in the past with   The other blood thinner she was on  Merle patient

## 2021-01-04 ENCOUNTER — OFFICE VISIT (OUTPATIENT)
Dept: CARDIOLOGY CLINIC | Age: 81
End: 2021-01-04
Payer: MEDICARE

## 2021-01-04 VITALS
HEIGHT: 64 IN | HEART RATE: 116 BPM | WEIGHT: 212 LBS | BODY MASS INDEX: 36.19 KG/M2 | DIASTOLIC BLOOD PRESSURE: 90 MMHG | SYSTOLIC BLOOD PRESSURE: 130 MMHG

## 2021-01-04 DIAGNOSIS — I48.91 ATRIAL FIBRILLATION, UNSPECIFIED TYPE (HCC): ICD-10-CM

## 2021-01-04 PROCEDURE — 99214 OFFICE O/P EST MOD 30 MIN: CPT | Performed by: INTERNAL MEDICINE

## 2021-01-04 PROCEDURE — 1090F PRES/ABSN URINE INCON ASSESS: CPT | Performed by: INTERNAL MEDICINE

## 2021-01-04 PROCEDURE — G8484 FLU IMMUNIZE NO ADMIN: HCPCS | Performed by: INTERNAL MEDICINE

## 2021-01-04 PROCEDURE — G8400 PT W/DXA NO RESULTS DOC: HCPCS | Performed by: INTERNAL MEDICINE

## 2021-01-04 PROCEDURE — 1123F ACP DISCUSS/DSCN MKR DOCD: CPT | Performed by: INTERNAL MEDICINE

## 2021-01-04 PROCEDURE — G8427 DOCREV CUR MEDS BY ELIG CLIN: HCPCS | Performed by: INTERNAL MEDICINE

## 2021-01-04 PROCEDURE — G8417 CALC BMI ABV UP PARAM F/U: HCPCS | Performed by: INTERNAL MEDICINE

## 2021-01-04 PROCEDURE — 1036F TOBACCO NON-USER: CPT | Performed by: INTERNAL MEDICINE

## 2021-01-04 PROCEDURE — 4040F PNEUMOC VAC/ADMIN/RCVD: CPT | Performed by: INTERNAL MEDICINE

## 2021-01-04 RX ORDER — ATORVASTATIN CALCIUM 10 MG/1
10 TABLET, FILM COATED ORAL DAILY
Qty: 90 TABLET | Refills: 1 | Status: SHIPPED | OUTPATIENT
Start: 2021-01-04

## 2021-01-04 RX ORDER — DILTIAZEM HYDROCHLORIDE 120 MG/1
120 CAPSULE, EXTENDED RELEASE ORAL 2 TIMES DAILY
Qty: 60 CAPSULE | Refills: 3 | Status: SHIPPED | OUTPATIENT
Start: 2021-01-04 | End: 2021-10-11

## 2021-01-04 RX ORDER — PANTOPRAZOLE SODIUM 40 MG/1
40 TABLET, DELAYED RELEASE ORAL
Qty: 30 TABLET | Refills: 0 | Status: SHIPPED | OUTPATIENT
Start: 2021-01-04 | End: 2021-03-29

## 2021-01-04 RX ORDER — POTASSIUM CHLORIDE 750 MG/1
10 TABLET, EXTENDED RELEASE ORAL DAILY
Qty: 30 TABLET | Refills: 3 | Status: SHIPPED | OUTPATIENT
Start: 2021-01-04

## 2021-01-04 RX ORDER — SULFAMETHOXAZOLE AND TRIMETHOPRIM 800; 160 MG/1; MG/1
1 TABLET ORAL 2 TIMES DAILY
Qty: 10 TABLET | Refills: 0 | Status: SHIPPED | OUTPATIENT
Start: 2021-01-04 | End: 2021-01-09

## 2021-01-04 RX ORDER — TORSEMIDE 20 MG/1
20 TABLET ORAL PRN
Qty: 60 TABLET | Refills: 0 | Status: SHIPPED | OUTPATIENT
Start: 2021-01-04 | End: 2022-10-26

## 2021-01-04 NOTE — LETTER
Estrellita Naranjo Traeon  1940  [id number]    Have you had any Chest Pain that is not new? - No     Have you had any Shortness of Breath - No    Have you had any dizziness - No    Have you had any palpitations that are not new? - No    Do you have any edema - swelling in legs    If Yes - CHECK TO SEE IF THE EDEMA IS PITTING  How long have they been having edema - Months  If Yes - Have they worn compression stockings Yes    Vein \"LEG PROBLEM Questionnaire\"  1. Do you have prominent leg veins? No   2. Do you have any skin discoloration? No  3. Do you have any healed or active sores? No  4. Do you have swelling of the legs? No  5. Do you have a family history of varicose veins? No  6. Does your profession involve pro-longed        standing or heavy lifting? No  7. Have you been fighting overweight problems? No  8. Do you have restless legs? No  9. Do you have any night time cramps? No  10.  Do you have any of the following in your legs:    Do you have a surgery or procedure scheduled in the near future - No
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
## 2021-01-23 DIAGNOSIS — I48.91 ATRIAL FIBRILLATION, UNSPECIFIED TYPE (HCC): ICD-10-CM

## 2021-02-25 ENCOUNTER — TELEPHONE (OUTPATIENT)
Dept: CARDIOLOGY CLINIC | Age: 81
End: 2021-02-25

## 2021-02-25 NOTE — TELEPHONE ENCOUNTER
Revised Cardiac Risk Index:   High risk type of surgery no   H/O ischemic heart disease  (h/o MI, or a positive stress test, current complaint of chest pain considered to be secondary to myocardial ischemia,  Use of nitrate therapy or ECG with pathological Q waves; do not count prior coronary revascularization procedure unless one of the other criteria for ischemic heart disease is present) no     H/O heart failure no  H/O CVA no   H/O DM treated with insulin no  Preoperative serum creatinine >2.0 mg/dl (177 micromol/L) no     The calculated rate of cardiac death, nonfatal myocardial infarction, and nonfatal cardiac arrest according to the number of predictors is; No risk factors  0.4% (95% CI: 0.1-0.8)     she is considered a low risk for surgery. Anticoagulation can be held prior to surgery at the discretion of the surgeon. Current recommendations are to hold 24-48 hours prior to surgery. It should be resumed as soon as possible if held. Electronically signed by LIZ Dinero CNP on 2/25/2021 at 12:10 PM
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
## 2021-03-29 ENCOUNTER — OFFICE VISIT (OUTPATIENT)
Dept: CARDIOLOGY CLINIC | Age: 81
End: 2021-03-29
Payer: MEDICARE

## 2021-03-29 VITALS
DIASTOLIC BLOOD PRESSURE: 84 MMHG | HEIGHT: 64 IN | HEART RATE: 84 BPM | SYSTOLIC BLOOD PRESSURE: 118 MMHG | BODY MASS INDEX: 36.6 KG/M2 | WEIGHT: 214.4 LBS

## 2021-03-29 DIAGNOSIS — I48.0 PAF (PAROXYSMAL ATRIAL FIBRILLATION) (HCC): Primary | ICD-10-CM

## 2021-03-29 PROCEDURE — 4040F PNEUMOC VAC/ADMIN/RCVD: CPT | Performed by: INTERNAL MEDICINE

## 2021-03-29 PROCEDURE — G8417 CALC BMI ABV UP PARAM F/U: HCPCS | Performed by: INTERNAL MEDICINE

## 2021-03-29 PROCEDURE — 1090F PRES/ABSN URINE INCON ASSESS: CPT | Performed by: INTERNAL MEDICINE

## 2021-03-29 PROCEDURE — G8400 PT W/DXA NO RESULTS DOC: HCPCS | Performed by: INTERNAL MEDICINE

## 2021-03-29 PROCEDURE — G8484 FLU IMMUNIZE NO ADMIN: HCPCS | Performed by: INTERNAL MEDICINE

## 2021-03-29 PROCEDURE — 99214 OFFICE O/P EST MOD 30 MIN: CPT | Performed by: INTERNAL MEDICINE

## 2021-03-29 PROCEDURE — 1123F ACP DISCUSS/DSCN MKR DOCD: CPT | Performed by: INTERNAL MEDICINE

## 2021-03-29 PROCEDURE — G8427 DOCREV CUR MEDS BY ELIG CLIN: HCPCS | Performed by: INTERNAL MEDICINE

## 2021-03-29 PROCEDURE — 1036F TOBACCO NON-USER: CPT | Performed by: INTERNAL MEDICINE

## 2021-03-29 RX ORDER — ZINC GLUCONATE 50 MG
50 TABLET ORAL DAILY
COMMUNITY

## 2021-03-29 RX ORDER — ACETAMINOPHEN 160 MG
TABLET,DISINTEGRATING ORAL
COMMUNITY

## 2021-03-29 NOTE — LETTER
Seble Matias  1940  [id number]    Have you had any Chest Pain that is not new? - No      Have you had any Shortness of Breath - No    Have you had any dizziness - No    Have you had any palpitations that are not new? - No    Do you have any edema - swelling in No      Vein \"LEG PROBLEM Questionnaire\"  1. Do you have prominent leg veins? No   2. Do you have any skin discoloration? No  3. Do you have any healed or active sores? No  4. Do you have swelling of the legs? No  5. Do you have a family history of varicose veins? No  6. Does your profession involve pro-longed        standing or heavy lifting? No  7. Have you been fighting overweight problems? Yes  8. Do you have restless legs? No  9. Do you have any night time cramps? No  10.  Do you have any of the following in your legs:        none     When did you have your last labs drawn   Where did you have them done   What doctor ordered        Ask patient if they [facility] to [facility] if [facility] are not already signed [facility]     Check to see if we have an E-MAIL on file for the patient     Check medication list thoroughly!!! AND RECONCILE OUTSIDE MEDICATIONS  If dose has changed change the entire order not just the MG  BE SURE TO ASK PATIENT IF THEY NEED MEDICATION REFILLS     At check out add to every patient's \"wrap up\" the following dot phrase AFTERHOURSEDUCATION and ensure we explain this to our patients
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
## 2021-03-29 NOTE — PROGRESS NOTES
[id number] Score for Atrial Fibrillation Stroke Risk   Risk   Factors  Component Value   C CHF No 0   H HTN Yes 1   A2 Age >= 76 Yes,  (76 y.o.) 2   D DM No 0   S2 Prior Stroke/TIA No 0   V Vascular Disease No 0   A Age 74-69 No,  (76 y.o.) 0   Sc Sex female 1    [id number]  Score  4   Score last updated 3/29/21 47:74 AM EDT    Click here for a link to the UpToDate guideline \"Atrial Fibrillation: Anticoagulation therapy to prevent embolization    Disclaimer: Risk Score calculation is dependent on accuracy of patient problem list and past encounter diagnosis.

## 2021-06-12 ENCOUNTER — APPOINTMENT (OUTPATIENT)
Dept: CT IMAGING | Age: 81
End: 2021-06-12
Payer: MEDICARE

## 2021-06-12 ENCOUNTER — HOSPITAL ENCOUNTER (EMERGENCY)
Age: 81
Discharge: HOME | End: 2021-06-12
Attending: EMERGENCY MEDICINE
Payer: MEDICARE

## 2021-06-12 VITALS
HEART RATE: 73 BPM | BODY MASS INDEX: 36.54 KG/M2 | OXYGEN SATURATION: 97 % | HEIGHT: 64 IN | DIASTOLIC BLOOD PRESSURE: 77 MMHG | TEMPERATURE: 98.4 F | WEIGHT: 214 LBS | RESPIRATION RATE: 14 BRPM | SYSTOLIC BLOOD PRESSURE: 145 MMHG

## 2021-06-12 DIAGNOSIS — S32.020A CLOSED COMPRESSION FRACTURE OF L2 LUMBAR VERTEBRA, INITIAL ENCOUNTER (HCC): Primary | ICD-10-CM

## 2021-06-12 PROCEDURE — 71250 CT THORAX DX C-: CPT

## 2021-06-12 PROCEDURE — 70450 CT HEAD/BRAIN W/O DYE: CPT

## 2021-06-12 PROCEDURE — 72125 CT NECK SPINE W/O DYE: CPT

## 2021-06-12 PROCEDURE — 6370000000 HC RX 637 (ALT 250 FOR IP): Performed by: EMERGENCY MEDICINE

## 2021-06-12 PROCEDURE — 74176 CT ABD & PELVIS W/O CONTRAST: CPT

## 2021-06-12 PROCEDURE — 99283 EMERGENCY DEPT VISIT LOW MDM: CPT

## 2021-06-12 RX ORDER — HYDROCODONE BITARTRATE AND ACETAMINOPHEN 5; 325 MG/1; MG/1
1 TABLET ORAL EVERY 8 HOURS PRN
Qty: 20 TABLET | Refills: 0 | Status: SHIPPED | OUTPATIENT
Start: 2021-06-12 | End: 2021-06-19

## 2021-06-12 RX ORDER — HYDROCODONE BITARTRATE AND ACETAMINOPHEN 5; 325 MG/1; MG/1
1 TABLET ORAL ONCE
Status: COMPLETED | OUTPATIENT
Start: 2021-06-12 | End: 2021-06-12

## 2021-06-12 RX ADMIN — HYDROCODONE BITARTRATE AND ACETAMINOPHEN 1 TABLET: 5; 325 TABLET ORAL at 11:00

## 2021-06-12 ASSESSMENT — PAIN DESCRIPTION - ORIENTATION: ORIENTATION: LOWER

## 2021-06-12 ASSESSMENT — PAIN DESCRIPTION - LOCATION: LOCATION: BACK

## 2021-06-12 ASSESSMENT — PAIN SCALES - GENERAL
PAINLEVEL_OUTOF10: 10
PAINLEVEL_OUTOF10: 10

## 2021-06-12 ASSESSMENT — PAIN DESCRIPTION - PAIN TYPE: TYPE: ACUTE PAIN

## 2021-06-12 NOTE — ED NOTES
Discharge instructions and prescription information given, pt expressed understanding of information and follow up care,      Matty Sharma RN  06/12/21 [id number]

## 2021-06-12 NOTE — ED TRIAGE NOTES
Pt reports lower back pain that radiates into bilat hips s/p falling this morning. Reports was on a step stool and lost her balance, falling onto hardwood floor.

## 2021-06-12 NOTE — ED PROVIDER NOTES
Triage Chief Complaint:   Fall, Back Pain, and Hip Pain      [place]:  Jerry Palafox is a 68 y.o. female that presents to the emergency department after she states that she fell backwards off a stepstool. States she has pain from her mid back to her low back. Does not recall she hit her head. States she did not pass out. She does have a history of atrial fibrillation and does take blood thinning medication. States she was able to get back up and ambulate. She denies any headache, nausea, blurry vision double vision. States pain in her mid to low back. Denies any pain in her hips or extremities. Denies any head or neck pain. States pain is a dull aching, 5 out of 10. Daniela Pulliam Past Medical History:   Diagnosis Date    Asthma     Atrial fibrillation ([address] 2020    Bronchitis, asthmatic     Chest pain     Atypical    Fibromyalgia     H/O cardiac catheterization 08/02/1991    Patent coronary aterties with preserved left ventricular funciton.  H/O cardiovascular stress test     10/12 Normal EF70%,11/17/09 ([place]) Normal pattern of perfusion in all regions. Post stress left venticle is normal in size. Normal Study. Normal perfusion in the distribution of all coronaries. Normal LV size and funciton. Rest EF is 70%. Global left ventricular systolic function is normal. Exercise capacity 7 METS. Exercise capacity is normal. 6/98, 6/99, 8/07,     H/O cardiovascular stress test 07/11/2019    Normal study.  H/O complete electrocardiogram     9/1/05 NSR, occ. PVC. 3/14/05, 7/26/02, 7/2/97, 6/5/98, 3/16/98, 2/2/90    H/O Doppler lower venous ultrasound 07/11/2019     Significant reflux noted of the Right CFV. No DVT.  H/O echocardiogram     11/17/09 2D, M-mode, Doppler and color flow Doppler. Left ventricle is normal size. Normal left ventricular wall thickness. Left ventricular systolic function is normal.  EF =>55%. Transmitral spectral Doppler flow pattern is suggestive of impaired LV relaxation.  Left ventricular wass motion is normal.  6/98, 5/19/05,    H/O echocardiogram 07/11/2019    EF 45-50%, Mild AR, TR, MR & PHTN.  History of cardiac monitoring 06/12/2020    30 day monitor: [address], [address] HR 84, Max  bpm, A-fib with intermittent mild RVR, Dizziness, SOB, tired & weakness correlated w/A-fib. Scheduled for A fib ablation 7/30    Hyperlipidemia     Hypertension     Macular degeneration      Past Surgical History:   Procedure Laterality Date    ABLATION OF DYSRHYTHMIC FOCUS  07/30/2020    Atrial Fibrillation Ablation & Atrial Flutter Ablation    BLADDER REMOVAL      1973, 1974    BREAST BIOPSY      1971, 72, 76, 76, 80 and 1997, benign    CARDIOVERSION  06/08/2020    KHARI / Cardioversion    CARPAL TUNNEL RELEASE      Right    CATARACT REMOVAL  03/03/2017    Left Eye    FOOT SURGERY  8/09    Left    HYSTERECTOMY      KNEE SURGERY  12/10    Left    OTHER SURGICAL HISTORY      Bone Spurs 1990, 1993     Family History   Problem Relation Age of Onset    Cancer Mother     Coronary Art Dis Mother     Other Mother         CABG    Coronary Art Dis Father     Dementia Father     Other Father         CHF, CABG    Stroke Father     Cancer Brother      Social History     Socioeconomic History    Marital status:      Spouse name: Not on file    Number of children: Not on file    Years of education: Not on file    Highest education level: Not on file   Occupational History    Not on file   Tobacco Use    Smoking status: Never Smoker    Smokeless tobacco: Never Used   Vaping Use    Vaping Use: Never used   Substance and Sexual Activity    Alcohol use:  Yes     Alcohol/week: 1.0 standard drinks     Types: 1 Glasses of wine per week     Comment: Rarely alcohol, 1 decaf coffee daily    Drug use: Not Currently    Sexual activity: Not Currently     Partners: Male     Comment:     Other Topics Concern    Not on file   Social History Narrative    Not on file     Social Determinants of Health     Financial Resource Strain:     Difficulty of Paying Living Expenses:    Food Insecurity:     Worried About Running Out of Food in the Last Year:     [address] in the Last Year:    Transportation Needs:     Lack of Transportation (Medical):  Lack of Transportation (Non-Medical):    Physical Activity:     Days of Exercise per Week:     Minutes of Exercise per Session:    Stress:     Feeling of Stress :    Social Connections:     Frequency of Communication with Friends and Family:     Frequency of Social Gatherings with Friends and Family:     Attends Yarsanism Services:     Active Member of Clubs or Organizations:     Attends Club or Organization Meetings:     Marital Status:    Intimate Partner Violence:     Fear of Current or Ex-Partner:     Emotionally Abused:     Physically Abused:     Sexually Abused:      No current facility-administered medications for this encounter. Current Outpatient Medications   Medication Sig Dispense Refill    HYDROcodone-acetaminophen (NORCO) 5-325 MG per tablet Take 1 tablet by mouth every 8 hours as needed for Pain for up to 7 days. 20 tablet 0    zinc gluconate 50 MG tablet Take 50 mg by mouth daily      Cholecalciferol (VITAMIN D3) 50 MCG (2000 UT) CAPS Take by mouth      apixaban (ELIQUIS) 5 MG TABS tablet Take 1 tablet by mouth 2 times daily 60 tablet 3    torsemide (DEMADEX) 20 MG tablet Take 1 tablet by mouth as needed (edema) 60 tablet 0    potassium chloride (KLOR-CON M10) 10 MEQ extended release tablet Take 1 tablet by mouth daily 30 tablet 3    dilTIAZem (CARDIZEM 12 HR) 120 MG extended release capsule Take 1 capsule by mouth 2 times daily 60 capsule 3    atorvastatin (LIPITOR) 10 MG tablet Take 1 tablet by mouth daily 90 tablet 1    Calcium Carbonate-Vitamin D (CALTRATE 600+D PO) Take  by mouth daily.          Allergies   Allergen Reactions    Latex Hives    Amlodipine Nausea Only    Gentamicin     Cozaar [Losartan] Swelling    Amlodipine Besylate Swelling     Muscle pains, swelling, cough    Ciprofloxacin Other (See Comments)     Affected tendons    Codeine      Migraines     Contrast [Iodides]     Iodine     Levaquin [Levofloxacin]      Tender Pain     Losartan Potassium     Macrodantin [Nitrofurantoin Macrocrystal]     Metoprolol      Weakness     Nutritional Supplements     Penicillins Hives     Throat Closure     Wasp Venom     Xarelto [Rivaroxaban] Other (See Comments)     States becomes hypertensive; heavy rectal bleeding    Zocor [Simvastatin - High Dose]      Migraines, Muscle Pain      Nursing Notes Reviewed    ROS:  At least 10 systems reviewed and otherwise negative except as in the [place]. Physical Exam:  ED Triage Vitals   Enc Vitals Group      BP       Pulse       Resp       Temp       Temp src       SpO2       Weight       Height       Head Circumference       Peak Flow       Pain Score       Pain Loc       Pain Edu? Excl. in [address]? My pulse oximetry interpretation is which is within the normal range    GENERAL APPEARANCE: Awake and alert. Cooperative. No acute distress. HEAD: Normocephalic. Atraumatic. EYES: EOM's grossly intact. Sclera anicteric. ENT: Mucous membranes are moist. Tolerates saliva. No trismus. NECK: Supple. No meningismus. Trachea midline. HEART: RRR. Radial pulses 2+. LUNGS: Respirations unlabored. CTAB. Equal chest rise. Equal breath sounds. No chest wall crepitus  ABDOMEN: Soft. Non-tender. No guarding or rebound. BACK: Patient with tenderness along thoracic and lumbar spine. No bruising. No step-offs. No CVA tenderness. EXTREMITIES: No acute deformities. Lower extremity edema with compression hose in place. States this is chronic for her. No knee or hip tenderness. Strong pulses in all 4 extremities. SKIN: Warm and dry. No abrasions, lacerations, hematomas noted  NEUROLOGICAL: No gross facial drooping.  Moves all 4 extremities spontaneously. Patient is awake, alert, oriented x4. Speaks in full sentences. PSYCHIATRIC: Normal mood. I have reviewed and interpreted all of the currently available lab results from this visit (if applicable):  No results found for this visit on 06/12/21. Radiographs:  [] Radiologist's Wet Read Report Reviewed:      CT ABDOMEN PELVIS WO CONTRAST Additional Contrast? None (Final result)  Result time 06/12/21 12:19:02  Final result by Zain Garcia MD (06/12/21 12:19:02)                Impression:    1. Acute L2 superior endplate compression fracture with minimal anterior   height loss as above. 2. Otherwise no evidence of acute injury in the chest, abdomen, or pelvis. 3. A few incidental findings as above for which no dedicated follow-up is   recommended. Narrative:    EXAMINATION:   CT OF THE ABDOMEN AND PELVIS WITHOUT CONTRAST; CT OF THE CHEST WITHOUT   CONTRAST     6/12/2021 10:56 am     TECHNIQUE:   CT of the abdomen and pelvis was performed without the administration of   intravenous contrast. Multiplanar reformatted images are provided for review. Dose modulation, iterative reconstruction, and/or weight based adjustment of   the mA/kV was utilized to reduce the radiation dose to as low as reasonably   achievable.; CT of the chest was performed without the administration of   intravenous contrast. Multiplanar reformatted images are provided for review. Dose modulation, iterative reconstruction, and/or weight based adjustment of   the mA/kV was utilized to reduce the radiation dose to as low as reasonably   achievable.      COMPARISON:   Chest radiograph 07/30/2020     HISTORY:   ORDERING SYSTEM PROVIDED HISTORY: fall backwards   TECHNOLOGIST PROVIDED HISTORY:   Reason for exam:->fall backwards   Additional Contrast?->None   Decision Support Exception - unselect if not a suspected or confirmed   emergency medical condition->Emergency Medical Condition (MA)   Reason for Exam: fall backwards   Acuity: Acute   Type of Exam: Initial   Mechanism of Injury: fell backwards off a stepstool this AM   Relevant Medical/Surgical History: low back pain / hx hyster, bladder surg     FINDINGS:   Lack of intravenous contrast administration, partially limiting evaluation of   the soft tissues and vasculature. CHEST     MEDIASTINUM:  Mild cardiomegaly.  No pericardial effusion.  Mild dilation of   the pulmonary trunk out of proportion with the ascending thoracic aorta, but   no significant dilation of intrapulmonary pulmonary artery branches out of   proportion with accompanying bronchi.  Mild atherosclerotic calcification in   the proximal left anterior descending coronary artery.  Minimal systemic   atherosclerotic calcifications.  No mediastinal nor obvious hilar   lymphadenopathy. LUNGS/PLEURA:  Minimal adherent secretions in the trachea.  Otherwise patent   central airways.  Normal variant left minor fissure.  A few linear opacities   near the bases, likely scarring or subsegmental atelectasis.  Minimal passive   atelectasis predominantly in the bilateral lower lobes.  No pleural effusions   nor pneumothoraces. SOFT TISSUES/BONES:   No supraclavicular nor axillary lymphadenopathy. Diffuse bony demineralization.  No acute fracture.  Joints maintain anatomic   alignment. ABDOMEN/PELVIS     ORGANS:  Somewhat nodular contour of the posterior right hemiliver along the   posterior right hemidiaphragm versus less likely splenosis.  Mild to moderate   pancreatic atrophy with some fatty replacement.  Normal appearance of the   gallbladder, spleen, adrenal glands, and kidneys. GI/BOWEL:  Normal course and caliber of the stomach, small bowel, colon, and   rectum without obstruction.  No definite visualization of the appendix if   present.  Minimal to mild stool.  Mild colonic diverticulosis without   findings of acute diverticulitis. PELVIS:  Surgically absent uterus.  Age-appropriate atrophy of the ovaries. Normal appearance of the urinary bladder.  Laxity of the pelvic floor   musculature     PERITONEUM/RETROPERITONEUM:  Minimal systemic atherosclerotic calcifications. No abdominal nor pelvic lymphadenopathy.  No free intraperitoneal fluid nor   gas. SOFT TISSUES/BONES:  Laxity of the anterior and lateral abdominal wall   musculature.  Tiny fat containing umbilical hernia.  No inguinal   lymphadenopathy.  Diffuse bony demineralization.  Acute L2 superior endplate   compression fracture with approximately 5% loss of anterior height.  Joints   maintain anatomic alignment.  1 anterolisthesis of L3 on L4 and of L4 on L5   without spondylolysis likely due to underlying degenerative changes.                     CT CHEST WO CONTRAST (Final result)  Result time 06/12/21 12:19:02  Final result by Osorio Holland MD (06/12/21 12:19:02)                Impression:    1. Acute L2 superior endplate compression fracture with minimal anterior   height loss as above. 2. Otherwise no evidence of acute injury in the chest, abdomen, or pelvis. 3. A few incidental findings as above for which no dedicated follow-up is   recommended. Narrative:    EXAMINATION:   CT OF THE ABDOMEN AND PELVIS WITHOUT CONTRAST; CT OF THE CHEST WITHOUT   CONTRAST     6/12/2021 10:56 am     TECHNIQUE:   CT of the abdomen and pelvis was performed without the administration of   intravenous contrast. Multiplanar reformatted images are provided for review. Dose modulation, iterative reconstruction, and/or weight based adjustment of   the mA/kV was utilized to reduce the radiation dose to as low as reasonably   achievable.; CT of the chest was performed without the administration of   intravenous contrast. Multiplanar reformatted images are provided for review.    Dose modulation, iterative reconstruction, and/or weight based adjustment of   the mA/kV was utilized to reduce the radiation dose to as low as reasonably   achievable. COMPARISON:   Chest radiograph 07/30/2020     HISTORY:   ORDERING SYSTEM PROVIDED HISTORY: fall backwards   TECHNOLOGIST PROVIDED HISTORY:   Reason for exam:->fall backwards   Additional Contrast?->None   Decision Support Exception - unselect if not a suspected or confirmed   emergency medical condition->Emergency Medical Condition (MA)   Reason for Exam: fall backwards   Acuity: Acute   Type of Exam: Initial   Mechanism of Injury: fell backwards off a stepstool this AM   Relevant Medical/Surgical History: low back pain / hx hyster, bladder surg     FINDINGS:   Lack of intravenous contrast administration, partially limiting evaluation of   the soft tissues and vasculature. CHEST     MEDIASTINUM:  Mild cardiomegaly.  No pericardial effusion.  Mild dilation of   the pulmonary trunk out of proportion with the ascending thoracic aorta, but   no significant dilation of intrapulmonary pulmonary artery branches out of   proportion with accompanying bronchi.  Mild atherosclerotic calcification in   the proximal left anterior descending coronary artery.  Minimal systemic   atherosclerotic calcifications.  No mediastinal nor obvious hilar   lymphadenopathy. LUNGS/PLEURA:  Minimal adherent secretions in the trachea.  Otherwise patent   central airways.  Normal variant left minor fissure.  A few linear opacities   near the bases, likely scarring or subsegmental atelectasis.  Minimal passive   atelectasis predominantly in the bilateral lower lobes.  No pleural effusions   nor pneumothoraces. SOFT TISSUES/BONES:   No supraclavicular nor axillary lymphadenopathy. Diffuse bony demineralization.  No acute fracture.  Joints maintain anatomic   alignment. ABDOMEN/PELVIS     ORGANS:  Somewhat nodular contour of the posterior right hemiliver along the   posterior right hemidiaphragm versus less likely splenosis.  Mild to moderate   pancreatic atrophy with some fatty replacement.  Normal appearance of the   gallbladder, spleen, adrenal glands, and kidneys. GI/BOWEL:  Normal course and caliber of the stomach, small bowel, colon, and   rectum without obstruction.  No definite visualization of the appendix if   present.  Minimal to mild stool.  Mild colonic diverticulosis without   findings of acute diverticulitis. PELVIS:  Surgically absent uterus.  Age-appropriate atrophy of the ovaries. Normal appearance of the urinary bladder.  Laxity of the pelvic floor   musculature     PERITONEUM/RETROPERITONEUM:  Minimal systemic atherosclerotic calcifications. No abdominal nor pelvic lymphadenopathy.  No free intraperitoneal fluid nor   gas. SOFT TISSUES/BONES:  Laxity of the anterior and lateral abdominal wall   musculature.  Tiny fat containing umbilical hernia.  No inguinal   lymphadenopathy.  Diffuse bony demineralization.  Acute L2 superior endplate   compression fracture with approximately 5% loss of anterior height.  Joints   maintain anatomic alignment.  1 anterolisthesis of L3 on L4 and of L4 on L5   without spondylolysis likely due to underlying degenerative changes.                     CT CERVICAL SPINE WO CONTRAST (Final result)  Result time 06/12/21 12:27:59  Final result by Alycia Nunez (06/12/21 12:27:59)                Impression:    Moderate to marked decrease in disc height from C2-3 through the C6-7 discs. Subtle grade 1 anterolisthesis of C7 over T1. A 3 mm bony density was noted involving the spinous process of C6.  The   fragment does appear well corticated and no adjacent soft tissue thickening   was seen.  The findings could represent either a subtle transverse fracture   involving the tip of the spinous process of C6 or a normal variant ununited   accessory ossification center.  Does the patient have point tenderness at   this level?              Narrative:    EXAMINATION:   CT OF THE CERVICAL SPINE WITHOUT CONTRAST 6/12/2021 10:56 am     TECHNIQUE:   CT of TECHNIQUE:   CT of the head was performed without the administration of intravenous   contrast. Dose modulation, iterative reconstruction, and/or weight based   adjustment of the mA/kV was utilized to reduce the radiation dose to as low   as reasonably achievable. COMPARISON:   None. HISTORY:   ORDERING SYSTEM PROVIDED HISTORY: fall, hit head. no LOC   TECHNOLOGIST PROVIDED HISTORY:   Reason for exam:->fall, hit head. no LOC   Has a \"code stroke\" or \"stroke alert\" been called? ->No   Decision Support Exception - unselect if not a suspected or confirmed   emergency medical condition->Emergency Medical Condition (MA)   Reason for Exam: fall, hit head. no LOC   Acuity: Acute   Type of Exam: Initial   Mechanism of Injury: fell backwards off a stepstool this AM   Relevant Medical/Surgical History: on blood thinners     FINDINGS:   BRAIN/VENTRICLES: There is no acute intracranial hemorrhage, mass effect or   midline shift.  No abnormal extra-axial fluid collection.  The gray-white   differentiation is maintained without evidence of an acute infarct.  There is   no evidence of hydrocephalus. A small atherosclerotic calcification was   identified in the left vertebral artery. ORBITS: The visualized portion of the orbits demonstrate no acute abnormality. SINUSES: The visualized paranasal sinuses and mastoid air cells demonstrate   no acute abnormality. SOFT TISSUES/SKULL:  No acute abnormality of the visualized skull or soft   tissues. [] Discussed with Radiologist:     [] The following radiograph was interpreted by myself in the absence of a radiologist:     EKG: (All EKG's are interpreted by myself in the absence of a cardiologist)      MDM:  Patient's vital signs are stable. Patient has several allergies listed. Did order Sandra Oiler for pain. She has tolerated this in the past without allergy. Patient has an L2 compression fracture with loss of approximately 5% of height.   CT C-spine shows moderate to marked decrease in disc height from C2-C3 and C6-C7. Subtle grade anterior listhesis of C7 over T1. Patient does not have point tenderness in her C-spine. Discussed results with patient. Will refer to [facility] for a brace as needed as well as to back specialist information was provided. Recommend she call them first thing on Monday. Will discharge with pain medication. No heavy lifting. Rest, pain medications. Clinical Impression:  1. Closed compression fracture of L2 lumbar vertebra, initial encounter [facility])        Disposition Vitals:  [unfilled], [unfilled], [unfilled], [unfilled]    Disposition referral (if applicable):  Monet Ayala MD  [facility] [address]          Maria Guadalupe Veloz MD  [address] [place] [address]  [phone]    Call   FOllow up with back doctor    [place] [address]  [phone]  Call   to be fitted for Carley Carlos MD  [address]  [phone]    Schedule an appointment as soon as possible for a visit   Call back specialist for follow up. Disposition medications (if applicable):  New Prescriptions    HYDROCODONE-ACETAMINOPHEN (NORCO) 5-325 MG PER TABLET    Take 1 tablet by mouth every 8 hours as needed for Pain for up to 7 days.          (Please note that portions of this note may have been completed with a voice recognition program. Efforts were made to edit the dictations but occasionally words are mis-transcribed.)    MD Gibran Leary MD  06/12/21 [id number]

## 2021-06-23 ENCOUNTER — TELEPHONE (OUTPATIENT)
Dept: CARDIOLOGY CLINIC | Age: 81
End: 2021-06-23

## 2021-10-11 ENCOUNTER — OFFICE VISIT (OUTPATIENT)
Dept: CARDIOLOGY CLINIC | Age: 81
End: 2021-10-11
Payer: MEDICARE

## 2021-10-11 VITALS
DIASTOLIC BLOOD PRESSURE: 98 MMHG | SYSTOLIC BLOOD PRESSURE: 152 MMHG | HEART RATE: 86 BPM | HEIGHT: 64 IN | BODY MASS INDEX: 34.89 KG/M2 | WEIGHT: 204.4 LBS

## 2021-10-11 DIAGNOSIS — E78.2 MIXED HYPERLIPIDEMIA: ICD-10-CM

## 2021-10-11 DIAGNOSIS — I10 PRIMARY HYPERTENSION: ICD-10-CM

## 2021-10-11 DIAGNOSIS — I48.19 ATRIAL FIBRILLATION, PERSISTENT (HCC): Primary | ICD-10-CM

## 2021-10-11 PROCEDURE — G8400 PT W/DXA NO RESULTS DOC: HCPCS | Performed by: NURSE PRACTITIONER

## 2021-10-11 PROCEDURE — 99214 OFFICE O/P EST MOD 30 MIN: CPT | Performed by: NURSE PRACTITIONER

## 2021-10-11 PROCEDURE — G8417 CALC BMI ABV UP PARAM F/U: HCPCS | Performed by: NURSE PRACTITIONER

## 2021-10-11 PROCEDURE — 1090F PRES/ABSN URINE INCON ASSESS: CPT | Performed by: NURSE PRACTITIONER

## 2021-10-11 PROCEDURE — G8427 DOCREV CUR MEDS BY ELIG CLIN: HCPCS | Performed by: NURSE PRACTITIONER

## 2021-10-11 PROCEDURE — 4040F PNEUMOC VAC/ADMIN/RCVD: CPT | Performed by: NURSE PRACTITIONER

## 2021-10-11 PROCEDURE — G8484 FLU IMMUNIZE NO ADMIN: HCPCS | Performed by: NURSE PRACTITIONER

## 2021-10-11 PROCEDURE — 1036F TOBACCO NON-USER: CPT | Performed by: NURSE PRACTITIONER

## 2021-10-11 PROCEDURE — 1123F ACP DISCUSS/DSCN MKR DOCD: CPT | Performed by: NURSE PRACTITIONER

## 2021-10-11 RX ORDER — DILTIAZEM HYDROCHLORIDE 180 MG/1
180 CAPSULE, COATED, EXTENDED RELEASE ORAL DAILY
COMMUNITY
End: 2021-10-11

## 2021-10-11 RX ORDER — TRIAMTERENE AND HYDROCHLOROTHIAZIDE 37.5; 25 MG/1; MG/1
TABLET ORAL
COMMUNITY
End: 2022-03-29

## 2021-10-11 RX ORDER — ASPIRIN 81 MG/1
81 TABLET ORAL DAILY
COMMUNITY
End: 2022-03-29

## 2021-10-11 RX ORDER — LATANOPROST 50 UG/ML
SOLUTION/ DROPS OPHTHALMIC NIGHTLY
COMMUNITY

## 2021-10-11 RX ORDER — DILTIAZEM HYDROCHLORIDE 180 MG/1
120 CAPSULE, EXTENDED RELEASE ORAL
COMMUNITY

## 2021-10-11 ASSESSMENT — ENCOUNTER SYMPTOMS: SHORTNESS OF BREATH: 0

## 2021-10-11 NOTE — LETTER
Rosa Lowe Cons     Benigno Houston  1940  [id number]    Have you had any Chest Pain that is not new? - No    Have you had any Shortness of Breath - No     Have you had any dizziness - No    Have you had any palpitations that are not new?  - No    Do you have any edema - swelling in No      Do you have a surgery or procedure scheduled in the near future - No

## 2021-10-11 NOTE — PROGRESS NOTES
[id number] Score for Atrial Fibrillation Stroke Risk   Risk   Factors  Component Value   C CHF No 0   H HTN Yes 1   A2 Age >= 76 Yes,  (77 y.o.) 2   D DM No 0   S2 Prior Stroke/TIA No 0   V Vascular Disease No 0   A Age 74-69 No,  (77 y.o.) 0   Sc Sex female 1    [id number]  Score  4   Score last updated 10/11/21 1:81 PM EDT    Click here for a link to the UpToDate guideline \"Atrial Fibrillation: Anticoagulation therapy to prevent embolization    Disclaimer: Risk Score calculation is dependent on accuracy of patient problem list and past encounter diagnosis.

## 2021-10-11 NOTE — ASSESSMENT & PLAN NOTE
-At or near goal Yes    -She is to continue current medications (Lipitor 10 mg) Hepatic function panel WNL. No abdominal pain. No myalgias.     -The nature of cardiac risk has been fully discussed with this patient. I have made her aware of her LDL target goal given her cardiovascular risk analysis. I have discussed the appropriate diet. The need for lifelong compliance in order to reduce risk is stressed. A regular exercise program is recommended to help achieve and maintain normal body weight, fitness and improve lipid balance. A written copy of a low fat, low cholesterol diet has been given to the patient.

## 2021-10-11 NOTE — PROGRESS NOTES
[facility] ([facility] PHYSICAL [facility]    [address]  Phone: [phone]    Fax [phone]                  Alyssa Cueva MD, Sreekanth Harp MD, Herman Kehr, MD, MD Jeovanny Dolan MD, Chi Knox MD, Skip Kaplan MD, [address] [place] [address]        Cardiology Progress Note      10/11/2021    RE: Ricardo Glover  ([birth year])                             Primary cardiologist: Dr. Jeovanny Starr       Subjective:  CC:   1. Atrial fibrillation, persistent ([address]    2. Primary hypertension    3. Mixed hyperlipidemia        HPI: Ricardo Glover, who is a  80y.o. year old female with a past medical history as listed below. Patient presents to the office for follow up on PAF, HTN, and hyperlipidemia. Patient is an active female who walks regularly. Patient is  compliant with medications. Patient denies any chest pain, shortness of breath, dizziness, syncope, or palpitations. Past Medical History:   Diagnosis Date    Asthma     Atrial fibrillation ([address] 2020    Bronchitis, asthmatic     Chest pain     Atypical    Fibromyalgia     H/O cardiac catheterization 08/02/1991    Patent coronary aterties with preserved left ventricular funciton.  H/O cardiovascular stress test     10/12 Normal EF70%,11/17/09 ([place]) Normal pattern of perfusion in all regions. Post stress left venticle is normal in size. Normal Study. Normal perfusion in the distribution of all coronaries. Normal LV size and funciton. Rest EF is 70%. Global left ventricular systolic function is normal. Exercise capacity 7 METS. Exercise capacity is normal. 6/98, 6/99, 8/07,     H/O cardiovascular stress test 07/11/2019    Normal study.  H/O complete electrocardiogram     9/1/05 NSR, occ.  PVC. 3/14/05, 7/26/02, 7/2/97, 6/5/98, 3/16/98, 2/2/90    H/O Doppler lower venous ultrasound 07/11/2019     Significant reflux noted of the Right CFV. No DVT.  H/O echocardiogram     11/17/09 2D, M-mode, Doppler and color flow Doppler. Left ventricle is normal size. Normal left ventricular wall thickness. Left ventricular systolic function is normal.  EF =>55%. Transmitral spectral Doppler flow pattern is suggestive of impaired LV relaxation. Left ventricular wass motion is normal.  6/98, 5/19/05,    H/O echocardiogram 07/11/2019    EF 45-50%, Mild AR, TR, MR & PHTN.  History of cardiac monitoring 06/12/2020    30 day monitor: [employer], [address] HR 84, Max  bpm, A-fib with intermittent mild RVR, Dizziness, SOB, tired & weakness correlated w/A-fib. Scheduled for A fib ablation 7/30    Hyperlipidemia     Hypertension     Macular degeneration        Current Outpatient Medications   Medication Sig Dispense Refill    dilTIAZem (TIAZAC) 180 MG extended release capsule diltiazem  mg capsule,extended release 24 hr   TAKE 1 CAPSULE BY MOUTH EVERY DAY      triamterene-hydroCHLOROthiazide (MAXZIDE-25) 37.5-25 MG per tablet triamterene 37.5 mg-hydrochlorothiazide 25 mg tablet   Take 1 tablet twice a day by oral route.  aspirin 81 MG EC tablet Take 81 mg by mouth daily      Probiotic Product (PROBIOTIC PO) Take by mouth      Multiple Vitamins-Minerals (ICAPS AREDS 2 PO) Take by mouth 2 times daily      latanoprost (XALATAN) 0.005 % ophthalmic solution Apply to eye nightly      Calcium Carbonate-Vit D-Min (CALTRATE PLUS PO) Take by mouth      Cholecalciferol (VITAMIN D3) 50 MCG (2000 UT) CAPS Take by mouth      potassium chloride (KLOR-CON M10) 10 MEQ extended release tablet Take 1 tablet by mouth daily 30 tablet 3    atorvastatin (LIPITOR) 10 MG tablet Take 1 tablet by mouth daily 90 tablet 1    Calcium Carbonate-Vitamin D (CALTRATE 600+D PO) Take  by mouth daily.         zinc gluconate 50 MG tablet Take 50 mg by mouth daily (Patient not taking: Reported on 10/11/2021)      apixaban (ELIQUIS) 5 MG TABS tablet Take 1 tablet by mouth 2 times daily (Patient not taking: Reported on 10/11/2021) 60 tablet 3    torsemide (DEMADEX) 20 MG tablet Take 1 tablet by mouth as needed (edema) (Patient not taking: Reported on 10/11/2021) 60 tablet 0     No current facility-administered medications for this visit. Review of Systems:  Review of Systems   Respiratory: Negative for shortness of breath. Cardiovascular: Negative for chest pain, palpitations and leg swelling. Musculoskeletal: Negative. Skin: Negative. Neurological: Negative for dizziness and weakness. All other systems reviewed and are negative. Objective:      Physical Exam:  BP (!) 152/98 (Site: Left Upper Arm, Position: Sitting, Cuff Size: Medium Adult)   Pulse 86   Ht 5' 4\" (1.626 m)   Wt 204 lb 6.4 oz (92.7 kg)   BMI 35.09 kg/m²   Wt Readings from Last 3 Encounters:   10/11/21 204 lb 6.4 oz (92.7 kg)   06/12/21 214 lb (97.1 kg)   03/29/21 214 lb 6.4 oz (97.3 kg)     Body mass index is 35.09 kg/m². Physical exam:  Physical Exam  Constitutional:       Appearance: She is well-developed. Cardiovascular:      Rate and Rhythm: Normal rate and regular rhythm. Pulses: Intact distal pulses. Dorsalis pedis pulses are 2+ on the right side and 2+ on the left side. Posterior tibial pulses are 2+ on the right side and 2+ on the left side. Heart sounds: Normal heart sounds, S1 normal and S2 normal.   Pulmonary:      Effort: Pulmonary effort is normal.      Breath sounds: Normal breath sounds. Musculoskeletal:         General: Normal range of motion. Skin:     General: Skin is warm and dry. Neurological:      Mental Status: She is alert and oriented to person, place, and time.           DATA:  Lab Results   Component Value Date    TROPONINI 0.015 01/31/2020     BNP:  No results found for: BNP  PT/INR:  No results found for: PTINR  No results found for: LABA1C  Lab Results   Component Value Date CHOL 164 05/09/2019    TRIG 100 05/09/2019    HDL 60 05/09/2019    LDLCALC 84 05/09/2019    LDLDIRECT 93 11/15/2017     Lab Results   Component Value Date    ALT 31 01/31/2020    AST 14 01/31/2020     TSH:    Lab Results   Component Value Date    TSH 1.88 01/31/2020       Vitals:    10/11/21 1451   BP: (!) 152/98   Pulse: 86       Echo:7/30/21  Summary   This is a limited echocardiogram.   Left ventricular systolic function is normal.   Ejection fraction is visually estimated at 50-55%. No evidence of any pericardial effusion. Stress Test:7/11/19  No ischemia     The 10-year ASCVD risk score (Portillo Rasmussen, et al., 2013) is: 41.9%    Values used to calculate the score:      Age: 80 years      Sex: Female      Is Non- : No      Diabetic: No      Tobacco smoker: No      Systolic Blood Pressure: 374 mmHg      Is BP treated: Yes      HDL Cholesterol: 60 mg/dL      Total Cholesterol: 164 mg/dL      Assessment/ Plan:     Atrial fibrillation, persistent   -S/P ablation of atrial fibrillation in 2020. Continue with Eliquis 5 mg twice daily for anticoagulation and Cardizem 120 mg for rate and rhythm control. HTN (hypertension)   -Elevated, patient reports at home readings in 120's daily. Continue with Cardizem 120 mg daily     Hyperlipidemia   -At or near goal Yes    -She is to continue current medications (Lipitor 10 mg) Hepatic function panel WNL. No abdominal pain. No myalgias.     -The nature of cardiac risk has been fully discussed with this patient. I have made her aware of her LDL target goal given her cardiovascular risk analysis. I have discussed the appropriate diet. The need for lifelong compliance in order to reduce risk is stressed. A regular exercise program is recommended to help achieve and maintain normal body weight, fitness and improve lipid balance. A written copy of a low fat, low cholesterol diet has been given to the patient.          Patient seen, interviewed and examined. Testing was reviewed. Patient is encouraged to exercise even a brisk walk for 30 minutes at least 3 to 4 times a week. Lifestyle and risk factor modificatons discussed. Various goals are discussed and questions answered. Continue current medications. Appropriate prescriptions are addressed. Questions answered and patient verbalizes understanding. Call for any problems, questions, or concerns. Pt is to follow up in 6 months for Cardiac management    Electronically signed by LIZ Vera CNP on 10/11/2021 at 3:42 PM

## 2021-10-11 NOTE — PATIENT INSTRUCTIONS
**It is YOUR responsibilty to bring medication bottles and/or updated medication list to [address] [place] [address]. This will allow us to better serve you and all your healthcare needs**    Please be informed that if you contact our office outside of normal business hours the physician on call cannot help with any scheduling or rescheduling issues, procedure instruction questions or any type of medication issue. We advise you for any urgent/emergency that you go to the nearest emergency room!     PLEASE CALL OUR OFFICE DURING NORMAL BUSINESS HOURS    Monday - Friday   8 am to 5 pm    [place] [place]: [phone]    [place]:  [phone]

## 2021-10-11 NOTE — ASSESSMENT & PLAN NOTE
-S/P ablation of atrial fibrillation in 2020. Continue with Eliquis 5 mg twice daily for anticoagulation and Cardizem 120 mg for rate and rhythm control.

## 2022-03-29 ENCOUNTER — OFFICE VISIT (OUTPATIENT)
Dept: CARDIOLOGY CLINIC | Age: 82
End: 2022-03-29
Payer: MEDICARE

## 2022-03-29 VITALS
SYSTOLIC BLOOD PRESSURE: 132 MMHG | HEART RATE: 82 BPM | DIASTOLIC BLOOD PRESSURE: 78 MMHG | WEIGHT: 202.4 LBS | BODY MASS INDEX: 34.56 KG/M2 | HEIGHT: 64 IN

## 2022-03-29 DIAGNOSIS — E78.2 MIXED HYPERLIPIDEMIA: ICD-10-CM

## 2022-03-29 DIAGNOSIS — I48.19 ATRIAL FIBRILLATION, PERSISTENT (HCC): ICD-10-CM

## 2022-03-29 DIAGNOSIS — I10 PRIMARY HYPERTENSION: ICD-10-CM

## 2022-03-29 DIAGNOSIS — I87.2 VENOUS (PERIPHERAL) INSUFFICIENCY: Primary | ICD-10-CM

## 2022-03-29 PROCEDURE — G8427 DOCREV CUR MEDS BY ELIG CLIN: HCPCS | Performed by: NURSE PRACTITIONER

## 2022-03-29 PROCEDURE — 93000 ELECTROCARDIOGRAM COMPLETE: CPT | Performed by: NURSE PRACTITIONER

## 2022-03-29 PROCEDURE — G8400 PT W/DXA NO RESULTS DOC: HCPCS | Performed by: NURSE PRACTITIONER

## 2022-03-29 PROCEDURE — 99214 OFFICE O/P EST MOD 30 MIN: CPT | Performed by: NURSE PRACTITIONER

## 2022-03-29 PROCEDURE — 1123F ACP DISCUSS/DSCN MKR DOCD: CPT | Performed by: NURSE PRACTITIONER

## 2022-03-29 PROCEDURE — G8484 FLU IMMUNIZE NO ADMIN: HCPCS | Performed by: NURSE PRACTITIONER

## 2022-03-29 PROCEDURE — G8417 CALC BMI ABV UP PARAM F/U: HCPCS | Performed by: NURSE PRACTITIONER

## 2022-03-29 PROCEDURE — 1090F PRES/ABSN URINE INCON ASSESS: CPT | Performed by: NURSE PRACTITIONER

## 2022-03-29 PROCEDURE — 4040F PNEUMOC VAC/ADMIN/RCVD: CPT | Performed by: NURSE PRACTITIONER

## 2022-03-29 PROCEDURE — 1036F TOBACCO NON-USER: CPT | Performed by: NURSE PRACTITIONER

## 2022-03-29 RX ORDER — MULTIVIT WITH MINERALS/LUTEIN
250 TABLET ORAL DAILY
COMMUNITY

## 2022-03-29 RX ORDER — FUROSEMIDE 20 MG/1
20 TABLET ORAL PRN
COMMUNITY
End: 2022-03-29 | Stop reason: CLARIF

## 2022-03-29 ASSESSMENT — ENCOUNTER SYMPTOMS: SHORTNESS OF BREATH: 0

## 2022-03-29 NOTE — PROGRESS NOTES
[facility], [address] [facility]  Phone: [phone]    Fax [phone]                  Rik Duane, MD, Napoleon Shah MD, Tarsha Plummer MD, MD Hunter Petty MD, Jackie Alcazar MD, Mary Ann Lang MD, Maya Moran, LIZ Kilpatrick, LIZ Haro, Luan Ever, LIZ        Cardiology Progress Note      3/29/2022    RE: Eliud García  ([birth year])                             Primary cardiologist: Dr. Hunter Temple       Subjective:  CC:   1. Venous (peripheral) insufficiency    2. Atrial fibrillation, persistent ([address]    3. Primary hypertension    4. Mixed hyperlipidemia        HPI: Eliud García, who is a  80y.o. year old female with a past medical history as listed below. Patient presents to the office for follow up on PAF, HTN, venous insufficiency, and hyperlipidemia. Patient is an active female who walks regularly. Patient is  compliant with medications. Patient denies any chest pain, shortness of breath, dizziness, syncope, or palpitations. Past Medical History:   Diagnosis Date    Asthma     Atrial fibrillation ([address] 2020    Bronchitis, asthmatic     Chest pain     Atypical    Fibromyalgia     H/O cardiac catheterization 08/02/1991    Patent coronary aterties with preserved left ventricular funciton.  H/O cardiovascular stress test     10/12 Normal EF70%,11/17/09 ([place]) Normal pattern of perfusion in all regions. Post stress left venticle is normal in size. Normal Study. Normal perfusion in the distribution of all coronaries. Normal LV size and funciton. Rest EF is 70%. Global left ventricular systolic function is normal. Exercise capacity 7 METS. Exercise capacity is normal. 6/98, 6/99, 8/07,     H/O cardiovascular stress test 07/11/2019    Normal study.  H/O complete electrocardiogram     9/1/05 NSR, occ.  PVC. 3/14/05, 7/26/02, 7/2/97, 6/5/98, 3/16/98, 2/2/90    H/O Doppler lower venous ultrasound 07/11/2019     Significant reflux noted of the Right CFV. No DVT.  H/O echocardiogram     11/17/09 2D, M-mode, Doppler and color flow Doppler. Left ventricle is normal size. Normal left ventricular wall thickness. Left ventricular systolic function is normal.  EF =>55%. Transmitral spectral Doppler flow pattern is suggestive of impaired LV relaxation. Left ventricular wass motion is normal.  6/98, 5/19/05,    H/O echocardiogram 07/11/2019    EF 45-50%, Mild AR, TR, MR & PHTN.  History of cardiac monitoring 06/12/2020    30 day monitor: [address], [address] HR 84, Max  bpm, A-fib with intermittent mild RVR, Dizziness, SOB, tired & weakness correlated w/A-fib. Scheduled for A fib ablation 7/30    Hyperlipidemia     Hypertension     Macular degeneration        Current Outpatient Medications   Medication Sig Dispense Refill    Ascorbic Acid (VITAMIN C) 250 MG tablet Take 250 mg by mouth daily      dilTIAZem (TIAZAC) 180 MG extended release capsule 120 mg       Probiotic Product (PROBIOTIC PO) Take by mouth      Multiple Vitamins-Minerals (ICAPS AREDS 2 PO) Take by mouth 2 times daily      latanoprost (XALATAN) 0.005 % ophthalmic solution Apply to eye nightly      zinc gluconate 50 MG tablet Take 50 mg by mouth daily       Cholecalciferol (VITAMIN D3) 50 MCG (2000 UT) CAPS Take by mouth      torsemide (DEMADEX) 20 MG tablet Take 1 tablet by mouth as needed (edema) 60 tablet 0    potassium chloride (KLOR-CON M10) 10 MEQ extended release tablet Take 1 tablet by mouth daily 30 tablet 3    atorvastatin (LIPITOR) 10 MG tablet Take 1 tablet by mouth daily (Patient taking differently: Take 10 mg by mouth daily Takes 1 every 3 days) 90 tablet 1     No current facility-administered medications for this visit. Review of Systems:  Review of Systems   Respiratory: Negative for shortness of breath.     Cardiovascular: Negative for chest pain, palpitations and leg swelling. Musculoskeletal: Negative. Skin: Negative. Neurological: Negative for dizziness and weakness. All other systems reviewed and are negative. Objective:      Physical Exam:  /78 (Site: Left Upper Arm, Position: Sitting, Cuff Size: Large Adult)   Pulse 82   Ht 5' 4\" (1.626 m)   Wt 202 lb 6.4 oz (91.8 kg)   BMI 34.74 kg/m²   Wt Readings from Last 3 Encounters:   03/29/22 202 lb 6.4 oz (91.8 kg)   10/11/21 204 lb 6.4 oz (92.7 kg)   06/12/21 214 lb (97.1 kg)     Body mass index is 34.74 kg/m². Physical exam:  Physical Exam  Constitutional:       Appearance: She is well-developed. Cardiovascular:      Rate and Rhythm: Normal rate and regular rhythm. Pulses: Intact distal pulses. Dorsalis pedis pulses are 2+ on the right side and 2+ on the left side. Posterior tibial pulses are 2+ on the right side and 2+ on the left side. Heart sounds: Normal heart sounds, S1 normal and S2 normal.   Pulmonary:      Effort: Pulmonary effort is normal.      Breath sounds: Normal breath sounds. Musculoskeletal:         General: Normal range of motion. Skin:     General: Skin is warm and dry. Neurological:      Mental Status: She is alert and oriented to person, place, and time.           DATA:  Lab Results   Component Value Date    TROPONINI 0.015 01/31/2020     BNP:  No results found for: BNP  PT/INR:  No results found for: PTINR  No results found for: LABA1C  Lab Results   Component Value Date    CHOL 164 05/09/2019    TRIG 100 05/09/2019    HDL 60 05/09/2019    LDLCALC 84 05/09/2019    LDLDIRECT 93 11/15/2017     Lab Results   Component Value Date    ALT 31 01/31/2020    AST 14 01/31/2020     TSH:    Lab Results   Component Value Date    TSH 1.88 01/31/2020       Vitals:    03/29/22 0954   BP: 132/78   Pulse: 82       Echo:7/30/21  Summary   This is a limited echocardiogram.   Left ventricular systolic function is normal.   Ejection fraction is visually estimated at 50-55%. No evidence of any pericardial effusion. Stress Test:7/11/19  No ischemia     The 10-year ASCVD risk score (Alaina Soto., et al., 2013) is: 33.5%    Values used to calculate the score:      Age: 80 years      Sex: Female      Is Non- : No      Diabetic: No      Tobacco smoker: No      Systolic Blood Pressure: 056 mmHg      Is BP treated: Yes      HDL Cholesterol: 60 mg/dL      Total Cholesterol: 164 mg/dL      Assessment/ Plan:     Venous Reflux  -Significant reflux in right CFV, recommend compression stockings. Continue with Torsemide 20 mg PRN. Atrial fibrillation, persistent   -S/P ablation of atrial fibrillation in 2020. Chads-Vas is 3,  Patient has a history of GI bleed and hematuria while on Eliquis. Patient refused anticoagulation restart after work-up completed. Recommend to restart taking ASA 81 mg daily. Continue with Cardizem 120 mg BID for rate and rhythm control. EKG today shows normal sinus rhythm. HTN (hypertension)   -Controlled, continue with Cardizem 120 mg daily      Hyperlipidemia   -At or near goal Yes    -She is to continue current medications (Lipitor 10 mg, 1 tab every 3 days) Hepatic function panel WNL. No abdominal pain. No myalgias.      -The nature of cardiac risk has been fully discussed with this patient. I have made her aware of her LDL target goal given her cardiovascular risk analysis. I have discussed the appropriate diet. The need for lifelong compliance in order to reduce risk is stressed. A regular exercise program is recommended to help achieve and maintain normal body weight, fitness and improve lipid balance. A written copy of a low fat, low cholesterol diet has been given to the patient. Patient seen, interviewed and examined. Testing was reviewed. Patient is encouraged to exercise even a brisk walk for 30 minutes at least 3 to 4 times a week. Lifestyle and risk factor modificatons discussed.  Various goals are discussed and questions answered. Continue current medications. Appropriate prescriptions are addressed. Questions answered and patient verbalizes understanding. Call for any problems, questions, or concerns. Pt is to follow up in 6 months for Cardiac management    Electronically signed by Darryle Parrot.  LIZ Mayer CNP on 3/29/2022 at 10:28 AM

## 2022-08-08 ENCOUNTER — OFFICE VISIT (OUTPATIENT)
Dept: CARDIOLOGY CLINIC | Age: 82
End: 2022-08-08
Payer: MEDICARE

## 2022-08-08 VITALS
SYSTOLIC BLOOD PRESSURE: 122 MMHG | WEIGHT: 208 LBS | BODY MASS INDEX: 35.51 KG/M2 | HEART RATE: 88 BPM | DIASTOLIC BLOOD PRESSURE: 60 MMHG | HEIGHT: 64 IN

## 2022-08-08 DIAGNOSIS — M79.89 LEG SWELLING: Primary | ICD-10-CM

## 2022-08-08 PROCEDURE — 1090F PRES/ABSN URINE INCON ASSESS: CPT | Performed by: INTERNAL MEDICINE

## 2022-08-08 PROCEDURE — G8427 DOCREV CUR MEDS BY ELIG CLIN: HCPCS | Performed by: INTERNAL MEDICINE

## 2022-08-08 PROCEDURE — 99214 OFFICE O/P EST MOD 30 MIN: CPT | Performed by: INTERNAL MEDICINE

## 2022-08-08 PROCEDURE — G8417 CALC BMI ABV UP PARAM F/U: HCPCS | Performed by: INTERNAL MEDICINE

## 2022-08-08 PROCEDURE — 1036F TOBACCO NON-USER: CPT | Performed by: INTERNAL MEDICINE

## 2022-08-08 PROCEDURE — 1123F ACP DISCUSS/DSCN MKR DOCD: CPT | Performed by: INTERNAL MEDICINE

## 2022-08-08 PROCEDURE — G8400 PT W/DXA NO RESULTS DOC: HCPCS | Performed by: INTERNAL MEDICINE
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
## 2022-08-08 NOTE — PROGRESS NOTES
Vein \"LEG PROBLEM Questionnaire\"  Do you have prominent leg veins? No   Do you have any skin discoloration? No  Do you have any healed or active sores? No  Do you have swelling of the legs? Yes  Do you have a family history of varicose veins? No  Does your profession involve pro-longed        standing or heavy lifting? No  7. Have you been fighting overweight problems? Yes  8. Do you have restless legs? No  9. Do you have any night time cramps? No  10. Do you have any of the following in your legs:        Aching and Itching I  11. If Yes - Have they worn compression stockings Yes  12.  If they have worn compression stockings  Years

## 2022-08-08 NOTE — PATIENT INSTRUCTIONS
Please be informed that if you contact our office outside of normal business hours the physician on call cannot help with any scheduling or rescheduling issues, procedure instruction questions or any type of medication issue. We advise you for any urgent/emergency that you go to the nearest emergency room! PLEASE CALL OUR OFFICE DURING NORMAL BUSINESS HOURS    Monday - Friday   8 am to 5 pm    [place]: [phone]    [place]:  [phone]  **It is YOUR responsibilty to bring medication bottles and/or updated medication list to [address] [place] [address]. This will allow us to better serve you and all your healthcare needs**  [facility] Locations - No appointment necessary. Sites open Monday to Friday. Call your preferred location for test preparation, business hours and other information you need. [employer] accepts [employer]. [place] [facility] [address] Geri Gay, [address] [facility]  Phone: [phone] [facility] [address] [facility]  [address].   Callie donnelly, [address]  Phone: [phone]

## 2022-08-29 ENCOUNTER — PROCEDURE VISIT (OUTPATIENT)
Dept: CARDIOLOGY CLINIC | Age: 82
End: 2022-08-29
Payer: MEDICARE

## 2022-08-29 DIAGNOSIS — M79.89 LEG SWELLING: ICD-10-CM

## 2022-08-29 PROCEDURE — 93970 EXTREMITY STUDY: CPT | Performed by: INTERNAL MEDICINE

## 2022-08-30 ENCOUNTER — OFFICE VISIT (OUTPATIENT)
Dept: CARDIOLOGY CLINIC | Age: 82
End: 2022-08-30
Payer: MEDICARE

## 2022-08-30 VITALS
DIASTOLIC BLOOD PRESSURE: 76 MMHG | HEIGHT: 64 IN | SYSTOLIC BLOOD PRESSURE: 134 MMHG | HEART RATE: 80 BPM | BODY MASS INDEX: 35.3 KG/M2 | WEIGHT: 206.8 LBS

## 2022-08-30 DIAGNOSIS — Z98.890 S/P ABLATION OF ATRIAL FIBRILLATION: Primary | ICD-10-CM

## 2022-08-30 DIAGNOSIS — Z86.79 S/P ABLATION OF ATRIAL FIBRILLATION: Primary | ICD-10-CM

## 2022-08-30 PROCEDURE — 1090F PRES/ABSN URINE INCON ASSESS: CPT | Performed by: INTERNAL MEDICINE

## 2022-08-30 PROCEDURE — 1123F ACP DISCUSS/DSCN MKR DOCD: CPT | Performed by: INTERNAL MEDICINE

## 2022-08-30 PROCEDURE — G8400 PT W/DXA NO RESULTS DOC: HCPCS | Performed by: INTERNAL MEDICINE

## 2022-08-30 PROCEDURE — G8427 DOCREV CUR MEDS BY ELIG CLIN: HCPCS | Performed by: INTERNAL MEDICINE

## 2022-08-30 PROCEDURE — G8417 CALC BMI ABV UP PARAM F/U: HCPCS | Performed by: INTERNAL MEDICINE

## 2022-08-30 PROCEDURE — 93000 ELECTROCARDIOGRAM COMPLETE: CPT | Performed by: INTERNAL MEDICINE

## 2022-08-30 PROCEDURE — 1036F TOBACCO NON-USER: CPT | Performed by: INTERNAL MEDICINE

## 2022-08-30 PROCEDURE — 99213 OFFICE O/P EST LOW 20 MIN: CPT | Performed by: INTERNAL MEDICINE
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
## 2022-08-30 NOTE — PATIENT INSTRUCTIONS
Please be informed that if you contact our office outside of normal business hours the physician on call cannot help with any scheduling or rescheduling issues, procedure instruction questions or any type of medication issue. We advise you for any urgent/emergency that you go to the nearest emergency room! PLEASE CALL OUR OFFICE DURING NORMAL BUSINESS HOURS    Monday - Friday   8 am to 5 pm    [place]: [place]: [phone]    [place]:  [phone]    **It is YOUR responsibilty to bring medication bottles and/or updated medication list to [address] [place] [address].  This will allow us to better serve you and all your healthcare needs**

## 2022-09-06 ENCOUNTER — TELEPHONE (OUTPATIENT)
Dept: CARDIOLOGY CLINIC | Age: 82
End: 2022-09-06

## 2022-09-06 NOTE — TELEPHONE ENCOUNTER
Needs appt in Oct. Jyothi. Dr. Viky Aburto is scheduling a procedure. . Please call in before Thursday. Leaving town.

## 2022-09-08 ENCOUNTER — TELEPHONE (OUTPATIENT)
Dept: CARDIOLOGY CLINIC | Age: 82
End: 2022-09-08

## 2022-09-08 NOTE — TELEPHONE ENCOUNTER
Summary        Significant reflux noted of the Right CFV (2.1s) and GSV at the SFJ (1.0s). Significant reflux noted in the Left CFV (1.9s) and GSV at the SFJ (3.5s)    which is tortuous and would require Varithena. No evidence of DVT or SVT in the bilateral common femoral vein, femoral    vein, popliteal vein, greater saphenous vein or small saphenous vein. Spoke to patient regarding results of lower extremity doppler.

## 2022-10-23 ENCOUNTER — HOSPITAL ENCOUNTER (EMERGENCY)
Age: 82
Discharge: HOME | End: 2022-10-23
Attending: EMERGENCY MEDICINE
Payer: MEDICARE

## 2022-10-23 ENCOUNTER — APPOINTMENT (OUTPATIENT)
Dept: GENERAL RADIOLOGY | Age: 82
End: 2022-10-23
Payer: MEDICARE

## 2022-10-23 VITALS
SYSTOLIC BLOOD PRESSURE: 120 MMHG | WEIGHT: 203 LBS | DIASTOLIC BLOOD PRESSURE: 77 MMHG | HEIGHT: 64 IN | TEMPERATURE: 97.8 F | HEART RATE: 87 BPM | BODY MASS INDEX: 34.66 KG/M2 | OXYGEN SATURATION: 99 % | RESPIRATION RATE: 18 BRPM

## 2022-10-23 DIAGNOSIS — T50.905A ADVERSE EFFECT OF DRUG, INITIAL ENCOUNTER: Primary | ICD-10-CM

## 2022-10-23 DIAGNOSIS — M79.10 MYALGIA: ICD-10-CM

## 2022-10-23 DIAGNOSIS — M25.511 ACUTE PAIN OF RIGHT SHOULDER: ICD-10-CM

## 2022-10-23 LAB
ALBUMIN SERPL-MCNC: 3.5 GM/DL (ref 3.4–5)
ALP BLD-CCNC: 95 IU/L (ref 40–129)
ALT SERPL-CCNC: 13 U/L (ref 10–40)
ANION GAP SERPL CALCULATED.3IONS-SCNC: 12 MMOL/L (ref 4–16)
AST SERPL-CCNC: 12 IU/L (ref 15–37)
BASOPHILS ABSOLUTE: 0.1 K/CU MM
BASOPHILS RELATIVE PERCENT: 1 % (ref 0–1)
BILIRUB SERPL-MCNC: 0.3 MG/DL (ref 0–1)
BUN BLDV-MCNC: 19 MG/DL (ref 6–23)
CALCIUM SERPL-MCNC: 8.9 MG/DL (ref 8.3–10.6)
CHLORIDE BLD-SCNC: 104 MMOL/L (ref 99–110)
CO2: 22 MMOL/L (ref 21–32)
CREAT SERPL-MCNC: 0.6 MG/DL (ref 0.6–1.1)
DIFFERENTIAL TYPE: ABNORMAL
EOSINOPHILS ABSOLUTE: 0.1 K/CU MM
EOSINOPHILS RELATIVE PERCENT: 0.7 % (ref 0–3)
GFR SERPL CREATININE-BSD FRML MDRD: >60 ML/MIN/1.73M2
GLUCOSE BLD-MCNC: 127 MG/DL (ref 70–99)
HCT VFR BLD CALC: 42.4 % (ref 37–47)
HEMOGLOBIN: 13.8 GM/DL (ref 12.5–16)
IMMATURE NEUTROPHIL %: 0.3 % (ref 0–0.43)
LYMPHOCYTES ABSOLUTE: 0.8 K/CU MM
LYMPHOCYTES RELATIVE PERCENT: 8.5 % (ref 24–44)
MCH RBC QN AUTO: 29.2 PG (ref 27–31)
MCHC RBC AUTO-ENTMCNC: 32.5 % (ref 32–36)
MCV RBC AUTO: 89.6 FL (ref 78–100)
MONOCYTES ABSOLUTE: 0.5 K/CU MM
MONOCYTES RELATIVE PERCENT: 5.3 % (ref 0–4)
PDW BLD-RTO: 13.9 % (ref 11.7–14.9)
PLATELET # BLD: 197 K/CU MM (ref 140–440)
PMV BLD AUTO: 11.6 FL (ref 7.5–11.1)
POTASSIUM SERPL-SCNC: 3.9 MMOL/L (ref 3.5–5.1)
RBC # BLD: 4.73 M/CU MM (ref 4.2–5.4)
SEGMENTED NEUTROPHILS ABSOLUTE COUNT: 8.3 K/CU MM
SEGMENTED NEUTROPHILS RELATIVE PERCENT: 84.2 % (ref 36–66)
SODIUM BLD-SCNC: 138 MMOL/L (ref 135–145)
TOTAL IMMATURE NEUTOROPHIL: 0.03 K/CU MM
TOTAL PROTEIN: 6.5 GM/DL (ref 6.4–8.2)
WBC # BLD: 9.9 K/CU MM (ref 4–10.5)

## 2022-10-23 PROCEDURE — 96372 THER/PROPH/DIAG INJ SC/IM: CPT

## 2022-10-23 PROCEDURE — 6360000002 HC RX W HCPCS: Performed by: EMERGENCY MEDICINE

## 2022-10-23 PROCEDURE — 6370000000 HC RX 637 (ALT 250 FOR IP): Performed by: EMERGENCY MEDICINE

## 2022-10-23 PROCEDURE — 80053 COMPREHEN METABOLIC PANEL: CPT

## 2022-10-23 PROCEDURE — 99284 EMERGENCY DEPT VISIT MOD MDM: CPT

## 2022-10-23 PROCEDURE — 85025 COMPLETE CBC W/AUTO DIFF WBC: CPT

## 2022-10-23 PROCEDURE — 73030 X-RAY EXAM OF SHOULDER: CPT

## 2022-10-23 RX ORDER — FAMOTIDINE 20 MG/1
20 TABLET, FILM COATED ORAL ONCE
Status: COMPLETED | OUTPATIENT
Start: 2022-10-23 | End: 2022-10-23

## 2022-10-23 RX ORDER — DEXAMETHASONE SODIUM PHOSPHATE 4 MG/ML
8 INJECTION, SOLUTION INTRA-ARTICULAR; INTRALESIONAL; INTRAMUSCULAR; INTRAVENOUS; SOFT TISSUE ONCE
Status: COMPLETED | OUTPATIENT
Start: 2022-10-23 | End: 2022-10-23

## 2022-10-23 RX ORDER — ACETAMINOPHEN 325 MG/1
650 TABLET ORAL ONCE
Status: DISCONTINUED | OUTPATIENT
Start: 2022-10-23 | End: 2022-10-23

## 2022-10-23 RX ORDER — DIPHENHYDRAMINE HCL 25 MG
25 TABLET ORAL ONCE
Status: COMPLETED | OUTPATIENT
Start: 2022-10-23 | End: 2022-10-23

## 2022-10-23 RX ORDER — HYDROCODONE BITARTRATE AND ACETAMINOPHEN 5; 325 MG/1; MG/1
1 TABLET ORAL EVERY 6 HOURS PRN
Status: DISCONTINUED | OUTPATIENT
Start: 2022-10-23 | End: 2022-10-23 | Stop reason: HOSPADM

## 2022-10-23 RX ADMIN — FAMOTIDINE 20 MG: 20 TABLET ORAL at 17:28

## 2022-10-23 RX ADMIN — DEXAMETHASONE SODIUM PHOSPHATE 8 MG: 4 INJECTION, SOLUTION INTRAMUSCULAR; INTRAVENOUS at 17:28

## 2022-10-23 RX ADMIN — DIPHENHYDRAMINE HYDROCHLORIDE 25 MG: 25 TABLET ORAL at 17:28

## 2022-10-23 RX ADMIN — HYDROCODONE BITARTRATE AND ACETAMINOPHEN 1 TABLET: 5; 325 TABLET ORAL at 19:03

## 2022-10-23 ASSESSMENT — PAIN DESCRIPTION - LOCATION
LOCATION: ARM
LOCATION: ARM;SHOULDER

## 2022-10-23 ASSESSMENT — PAIN DESCRIPTION - ORIENTATION: ORIENTATION: RIGHT

## 2022-10-23 ASSESSMENT — PAIN - FUNCTIONAL ASSESSMENT: PAIN_FUNCTIONAL_ASSESSMENT: 0-10

## 2022-10-23 ASSESSMENT — PAIN SCALES - GENERAL
PAINLEVEL_OUTOF10: 9
PAINLEVEL_OUTOF10: 9

## 2022-10-23 NOTE — DISCHARGE INSTRUCTIONS
Follow-up with primary care physician for reevaluation. Call for an appointment  Discontinue medications that you follow-up with your cardiologist  Return to the emergency department AMI increased pain swelling fever chills nausea vomiting or worsening symptoms.

## 2022-10-26 ENCOUNTER — OFFICE VISIT (OUTPATIENT)
Dept: CARDIOLOGY CLINIC | Age: 82
End: 2022-10-26
Payer: MEDICARE

## 2022-10-26 VITALS
DIASTOLIC BLOOD PRESSURE: 80 MMHG | HEIGHT: 64 IN | BODY MASS INDEX: 35.3 KG/M2 | HEART RATE: 93 BPM | OXYGEN SATURATION: 97 % | WEIGHT: 206.8 LBS | SYSTOLIC BLOOD PRESSURE: 136 MMHG

## 2022-10-26 DIAGNOSIS — I48.0 PAF (PAROXYSMAL ATRIAL FIBRILLATION) (HCC): Primary | ICD-10-CM

## 2022-10-26 DIAGNOSIS — I48.19 ATRIAL FIBRILLATION, PERSISTENT (HCC): ICD-10-CM

## 2022-10-26 PROCEDURE — 1123F ACP DISCUSS/DSCN MKR DOCD: CPT | Performed by: INTERNAL MEDICINE

## 2022-10-26 PROCEDURE — G8400 PT W/DXA NO RESULTS DOC: HCPCS | Performed by: INTERNAL MEDICINE

## 2022-10-26 PROCEDURE — G8484 FLU IMMUNIZE NO ADMIN: HCPCS | Performed by: INTERNAL MEDICINE

## 2022-10-26 PROCEDURE — 1090F PRES/ABSN URINE INCON ASSESS: CPT | Performed by: INTERNAL MEDICINE

## 2022-10-26 PROCEDURE — 3078F DIAST BP <80 MM HG: CPT | Performed by: INTERNAL MEDICINE

## 2022-10-26 PROCEDURE — 99214 OFFICE O/P EST MOD 30 MIN: CPT | Performed by: INTERNAL MEDICINE

## 2022-10-26 PROCEDURE — 3074F SYST BP LT 130 MM HG: CPT | Performed by: INTERNAL MEDICINE

## 2022-10-26 PROCEDURE — G8417 CALC BMI ABV UP PARAM F/U: HCPCS | Performed by: INTERNAL MEDICINE

## 2022-10-26 PROCEDURE — 1036F TOBACCO NON-USER: CPT | Performed by: INTERNAL MEDICINE

## 2022-10-26 PROCEDURE — G8427 DOCREV CUR MEDS BY ELIG CLIN: HCPCS | Performed by: INTERNAL MEDICINE

## 2022-10-26 RX ORDER — ASPIRIN 81 MG/1
TABLET ORAL
COMMUNITY
Start: 2022-08-30

## 2022-10-26 RX ORDER — FUROSEMIDE 20 MG/1
20 TABLET ORAL DAILY PRN
Qty: 30 TABLET | Refills: 1 | Status: SHIPPED | OUTPATIENT
Start: 2022-10-26 | End: 2022-11-21

## 2022-10-26 NOTE — ED PROVIDER NOTES
Emergency Department Encounter    Patient: Dorothy Parham  MRN: [id number]  : 1940  Date of Evaluation: 10/26/2022  ED Provider:  Fátima Hernandez DO    Triage Chief Complaint:   Allergic Reaction (Reports started taking Torsemide yesterday. Reports muscle aches to marlon legs and right arm. Reports dizziness at times.)    [place]:  Dorothy Parham is a [de-identified] y.o. female that presents to the emergency department for concerns of allergic reaction to torosemide medication. She states she has been on that medication on and off x 2 years. She states she does not have to take it daily. She states she takes it as needed. She states the last 2-3 times she has taken it, it has caused muscle aches and pains in her thighs. She states she took it yesterday and she is now having muscle pains in thighs and right arm that is horrible. She states the right arm/shoulder pain is 10/10 on pain scale and she is barely able to move her shoulder. She states she has an appointment Wednesday with her Cardiologist Dr. Archana Pickard and an appointment Thursday with her Primary care physician. Otherwise she denies chest pain, shortness of breath fever, chills, cough, nausea, vomiting diarrhea, abdominal pain and no falls injuries or trauma.     ROS - see HPI, below listed is current ROS at time of my eval:  General:  No fevers, no chills, no weakness  Eyes:  No recent vison changes, no discharge  ENT:  No sore throat, no nasal congestion, no hearing changes  Cardiovascular:  No chest pain, no palpitations  Respiratory:  No shortness of breath, no cough, no wheezing  Gastrointestinal:  No pain, no nausea, no vomiting, no diarrhea  Musculoskeletal: positive for muscle pain in bilateral thighs, no joint pain  Skin:  No rash, no pruritis, no easy bruising  Neurologic:  No speech problems, no headache, no extremity numbness, no extremity tingling, no extremity weakness  Psychiatric:  No anxiety  Genitourinary:  No dysuria, no hematuria  Endocrine:  No unexpected weight gain, no unexpected weight loss  Extremities:  no edema, no pain    Past Medical History:   Diagnosis Date    Asthma     Atrial fibrillation ([facility] [address] 2020    Bronchitis, asthmatic     Chest pain     Atypical    Fibromyalgia     H/O cardiac catheterization 08/02/1991    Patent coronary aterties with preserved left ventricular funciton. H/O cardiovascular stress test     10/12 Normal EF70%,11/17/09 ([facility]) Normal pattern of perfusion in all regions. Post stress left venticle is normal in size. Normal Study. Normal perfusion in the distribution of all coronaries. Normal LV size and funciton. Rest EF is 70%. Global left ventricular systolic function is normal. Exercise capacity 7 METS. Exercise capacity is normal. 6/98, 6/99, 8/07,     H/O cardiovascular stress test 07/11/2019    Normal study. H/O complete electrocardiogram     9/1/05 NSR, occ. PVC. 3/14/05, 7/26/02, 7/2/97, 6/5/98, 3/16/98, 2/2/90    H/O Doppler lower venous ultrasound 07/11/2019     Significant reflux noted of the Right CFV. No DVT. H/O echocardiogram     11/17/09 2D, M-mode, Doppler and color flow Doppler. Left ventricle is normal size. Normal left ventricular wall thickness. Left ventricular systolic function is normal.  EF =>55%. Transmitral spectral Doppler flow pattern is suggestive of impaired LV relaxation. Left ventricular wass motion is normal.  6/98, 5/19/05,    H/O echocardiogram 07/11/2019    EF 45-50%, Mild AR, TR, MR & PHTN. History of cardiac monitoring 06/12/2020    30 day monitor: [address], [address] HR 84, Max  bpm, A-fib with intermittent mild RVR, Dizziness, SOB, tired & weakness correlated w/A-fib. Scheduled for A fib ablation 7/30    Hyperlipidemia     Hypertension     Lower extremity doppler 08/29/2022    Significant reflux noted of the right CFV and GSV at the [facility]. Significant reflux noted in the Left CFV and GSV at the [facility] which is tortuous.     Macular degeneration      Past Surgical History: 1    Elastic Bandages & Supports (MEDICAL COMPRESSION STOCKINGS) MISC 1 each by Does not apply route daily 1 each 0    Compression Stockings MISC by Does not apply route 20 - 30 mmh wear daily and take off at night  Thigh High 1 each 0    Ascorbic Acid (VITAMIN C) 250 MG tablet Take 250 mg by mouth daily      dilTIAZem (TIAZAC) 180 MG extended release capsule 120 mg       Probiotic Product (PROBIOTIC PO) Take by mouth      Multiple Vitamins-Minerals (ICAPS AREDS 2 PO) Take by mouth 2 times daily      latanoprost (XALATAN) 0.005 % ophthalmic solution Apply to eye nightly      zinc gluconate 50 MG tablet Take 50 mg by mouth daily       Cholecalciferol (VITAMIN D3) 50 MCG (2000 UT) CAPS Take by mouth      torsemide (DEMADEX) 20 MG tablet Take 1 tablet by mouth as needed (edema) 60 tablet 0    potassium chloride (KLOR-CON M10) 10 MEQ extended release tablet Take 1 tablet by mouth daily 30 tablet 3    atorvastatin (LIPITOR) 10 MG tablet Take 1 tablet by mouth daily (Patient taking differently: Take 10 mg by mouth daily Takes 1 every 3 days) 90 tablet 1     Allergies   Allergen Reactions    Latex Hives    Amlodipine Nausea Only    Gentamicin     Cozaar [Losartan] Swelling    Amlodipine Besylate Swelling     Muscle pains, swelling, cough    Ciprofloxacin Other (See Comments)     Affected tendons    Codeine      Migraines     Contrast [Iodides]     Iodine     Levaquin [Levofloxacin]      Tender Pain     Losartan Potassium     Macrodantin [Nitrofurantoin Macrocrystal]     Metoprolol      Weakness     Nutritional Supplements     Penicillins Hives     Throat Closure     Wasp Venom     Xarelto [Rivaroxaban] Other (See Comments)     States becomes hypertensive; heavy rectal bleeding    Zocor [Simvastatin - High Dose]      Migraines, Muscle Pain        Nursing Notes Reviewed    Physical Exam:  Triage VS:    ED Triage Vitals [10/23/22 1653]   Enc Vitals Group      BP (!) 120/56      Heart Rate (!) 103      Resp 16      Temp 97.8 °F (36.6 °C)      Temp src       SpO2 95 %      Weight 203 lb (92.1 kg)      Height 5' 4\" (1.626 m)      Head Circumference       Peak Flow       Pain Score       Pain Loc       Pain Edu? Excl. in [address]? /77   Pulse 87   Temp 97.8 °F (36.6 °C)   Resp 18   Ht 5' 4\" (1.626 m)   Wt 203 lb (92.1 kg)   SpO2 99%   BMI 34.84 kg/m²       My pulse ox interpretation is - normal    General appearance:  No acute distress. Skin:  Warm. Dry. Eye:  Extraocular movements intact. Ears, nose, mouth and throat:  Oral mucosa moist no tongue swelling throat swelling and uvula is midline, no difficulty swallowing, tolerating secretions, no drooling  Neck:  Trachea midline. Extremity:  bilateral thighs with no significant pain with palpation, no swelling, no signs of infection, intact pulses bilateral lower extremities. Right shoulder with muscle tenderness of humerus, decreased rom of rigght shoulder, no deformity or sign of infection, intact rigth radial pulse, No swelling. Heart:  Regular rate and rhythm, normal S1 & S2, no extra heart sounds. Perfusion:  intact  Respiratory:  Lungs clear to auscultation bilaterally. Respirations nonlabored. Abdominal:  Normal bowel sounds. Soft. Nontender. Non distended. Back:  No CVA tenderness to palpation     Neurological:  Alert and oriented times 3. No focal neuro deficits.              Psychiatric:  Appropriate    I have reviewed and interpreted all of the currently available lab results from this visit (if applicable):  Results for orders placed or performed during the hospital encounter of 10/23/22   CBC with Auto Differential   Result Value Ref Range    WBC 9.9 4.0 - 10.5 K/CU MM    RBC 4.73 4.2 - 5.4 M/CU MM    Hemoglobin 13.8 12.5 - 16.0 GM/DL    Hematocrit 42.4 37 - 47 %    MCV 89.6 78 - 100 FL    MCH 29.2 27 - 31 PG    MCHC 32.5 32.0 - 36.0 %    RDW 13.9 11.7 - 14.9 %    Platelets 973 251 - 393 K/CU MM    MPV 11.6 (H) 7.5 - 11.1 FL Differential Type AUTOMATED DIFFERENTIAL     Segs Relative 84.2 (H) 36 - 66 %    Lymphocytes % 8.5 (L) 24 - 44 %    Monocytes % 5.3 (H) 0 - 4 %    Eosinophils % 0.7 0 - 3 %    Basophils % 1.0 0 - 1 %    Segs Absolute 8.3 K/CU MM    Lymphocytes Absolute 0.8 K/CU MM    Monocytes Absolute 0.5 K/CU MM    Eosinophils Absolute 0.1 K/CU MM    Basophils Absolute 0.1 K/CU MM    Immature Neutrophil % 0.3 0 - 0.43 %    Total Immature Neutrophil 0.03 K/CU MM   Comprehensive Metabolic Panel   Result Value Ref Range    Sodium 138 135 - 145 MMOL/L    Potassium 3.9 3.5 - 5.1 MMOL/L    Chloride 104 99 - 110 mMol/L    CO2 22 21 - 32 MMOL/L    BUN 19 6 - 23 MG/DL    Creatinine 0.6 0.6 - 1.1 MG/DL    Est, Glom Filt Rate >60 >60 mL/min/1.73m2    Glucose 127 (H) 70 - 99 MG/DL    Calcium 8.9 8.3 - 10.6 MG/DL    Albumin 3.5 3.4 - 5.0 GM/DL    Total Protein 6.5 6.4 - 8.2 GM/DL    Total Bilirubin 0.3 0.0 - 1.0 MG/DL    ALT 13 10 - 40 U/L    AST 12 (L) 15 - 37 IU/L    Alkaline Phosphatase 95 40 - 129 IU/L    Anion Gap 12 4 - 16      Radiographs (if obtained):  Radiologist's Report Reviewed:  XR SHOULDER RIGHT (MIN 2 VIEWS)    Result Date: 10/23/2022  EXAMINATION: 3 XRAY VIEWS OF THE RIGHT SHOULDER 10/23/2022 6:44 pm COMPARISON: None. HISTORY: ORDERING SYSTEM PROVIDED HISTORY: pain decrease rom TECHNOLOGIST PROVIDED HISTORY: Reason for exam:->pain decrease rom Reason for Exam: pain decrease rom FINDINGS: No acute fracture. Normal glenohumeral and acromioclavicular alignment. No significant arthropathy. Soft tissues are unremarkable. No acute osseous abnormality the right shoulder or finding to suggest etiology of pain.        EKG (if obtained): (All EKG's are interpreted by myself in the absence of a cardiologist)    Medications   dexamethasone (DECADRON) injection 8 mg (8 mg IntraMUSCular Given 10/23/22 1728)   famotidine (PEPCID) tablet 20 mg (20 mg Oral Given 10/23/22 9290)   diphenhydrAMINE (BENADRYL) tablet 25 mg (25 mg Oral Given 10/23/22 1728)       MDM:  Patient presents with allergic reaction vs adverse reaction to medication. Patient initially given dexamethasone, benadryl and pepcid which did help with symptoms. Patient states right shoulder pain is bad. She was ordered labs to look at her electrolytes with were normal. Right shoulder xray was negative for any acute abnormalities. I discussed with patient discontinue torosemide until she sees her cardiologist Dr. Archana Pickard and PCP this coming Wednesday and Thursday. Patient will be discharged to home in stable condition. She is to return immediately if any worsening symptoms. Clinical Impression:  1. Adverse effect of drug, initial encounter    2. Myalgia    3. Acute pain of right shoulder      Disposition referral (if applicable):  Venus Angel MD  [address] [place], [place] [phone]    Schedule an appointment as soon as possible for a visit in 1 day  If symptoms worsen and for re-evaluation. call for an appointment    [place] [address] [address]  [phone]  Go in 1 day  If symptoms worsen    Disposition medications (if applicable):  Discharge Medication List as of 10/23/2022  7:50 PM        ED Provider Disposition Time  DISPOSITION Decision To Discharge 10/23/2022 07:45:43 PM      Comment: Please note this report has been produced using speech recognition software and may contain errors related to that system including errors in grammar, punctuation, and spelling, as well as words and phrases that may be inappropriate. Efforts were made to edit the dictations.         Fátima Hernandez DO  10/26/22 9990
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
## 2022-10-26 NOTE — LETTER
[place] IMPLANT      Date of Procedure: 2022 Time: 1100 Arrival Time: 0900    Patient Name: Ani Mcfarland  : 1940  MRN# [id number]      HOSPITAL: [facility])    Call to [facility] at [phone]  2 days before your procedure      X Please have blood work done 10/31/2022 at [address] [place] [address] or [facility], 2 days prior to procedure. X Please have COVID-19 Test done on 10/31/2022 at the [place] [address]. You will need to Quarantine yourself until procedure. X May have a light breakfast. Please do not eat or drink anything 4 hour prior to procedure. X You may take your medications in the morning the day of the procedure. Patient Signature: _________________________________        Staff: Pj Camacho MA       Staff Given Instructions:________________________                        Sam Godinez     PROCEDURE:  Frankie Oar    Date of Procedure: 2022 Time: 1100 Arrival Time: 0900    Patient Name: Ani Mcfarland  : 1940  MRN# [id number]    Day of Procedure Cath Lab Holding area Preop Orders:    ? IV peripheral saline lock (preferred left arm). ? If taking Coumadin, PT INR STAT on arrival day of procedure. ? Diabetic patients >> Accu check Blood sugar check. ? Document home medications in EPIC and include date and time of last   dose.     ? NPO on arrival to preop holding area. ? Notify Dr. Anisa Whalen of abnormal lab results.     ? Chest Prep> Clip hair anterior chest          Physician Signature:_________________________Date:_________Time:_________                                             *This consent is applicable for 30 days following patient signature*    PROCEDURAL INFORMED CONSENT FOR OPERATION / PROCEDURE    I (We), Ani Mcfarland authorize, Dr. Marc Godinez   and such assistants as may be selected by him/her, to perform the following operation/procedures:   LOOP RECORDER ([employer] 3069) IMPLANT   Note: If unable to obtain consent prior to an emergent procedure, document the emergent reason in the medical record. This procedure has been explained to my (our) satisfaction and included in the explanation was:   A) the intended benefit, nature, and extent of the procedure to be performed;   B) the significant risks involved and the probability of success;   C) alternative procedures and methods of treatment;   D) the dangers and probable consequences of such alternatives (including no procedure or treatment); E) the expected consequences of the procedure on my future health;   F) whether other qualified individuals would be performing important surgical tasks and / or whether  would be present to advise or support the procedure. I (we) understand that there are other risks of infection and other serious complications in the pre-operative/procedural and postoperative/procedural stages of my (our) care. I (we) have asked all of the questions which I (we) thought were important in deciding whether or not to undergo treatment or diagnosis. These questions have been answered to my (our) satisfaction. I (we) understand that no assurance can be given that the procedure will be a success, and no guarantee or warranty of success has been given to me (us). 2. It has been explained to me (us) that during the course of the operation/procedure, unforeseen conditions may be revealed that necessitate extension of the original procedure(s) or different procedure(s) than those set forth in Paragraph 1.  I (we) authorize and request that the above-named physician, his/her assistants or his/her designees, perform procedures as necessary and desirable if deemed to be in my (our) best interest.         3. I acknowledge that other health care personnel may be observing this procedure for the purpose of medical education or other specified purposes as may be necessary as requested and/or approved by my (our) physician. 4. I (we) consent to the disposal by the hospital Pathologist of the removed tissue, parts or organs in accordance with hospital policy. 5. I do_____ do not______ consent to the use of a local infiltration pain blocking agent that will be used by my provider/surgical provider to help alleviate pain during my procedure. 6. I do_____ do not_____ consent to an emergent blood transfusion in the case of a life-threatening situation that requires blood components to be administered. This consent is valid for 24 hours from the beginning of the procedure. 7. This patient does _____ or does not ______currently have a DNR status/order. If DNR order is in place, obtain \"Addendum to the Surgical Consent for ALL Patients with a DNR Order\" to address winnie-operative status for limited intervention or DNR suspension. 8. I have read and fully understand the above Consent for Operation/Procedure and that all blanks were completed before I signed the consent.     ____________________________  ___________/__________am/pm   Signature of Patient or legal representative Printed Name / Relationship Date / Time     ___________________________________ ___________/__________ am/pm   Witness to Signature Printed Name                                               Date / Time     Informed consent:   I have provided the explanation described above in section 1 to the patient and/or legal representative.  I have provided the patient and/or legal representative with an opportunity to ask any questions about the proposed operation/procedure.     ___________________Dr. Eduard Mariscal  ___________/__________ am/pm   Provider / Erum Stewart                                                Date / Time       Revised 8/2/2021                         Mary Chung Dr. Mahendra Meyer       PATIENT NAME:    Dagmar Myrick                         : 1940    PROCEDURE:  LOOP RECORDER LINQ    DATE OF PROCEDURE: 2022    DIAGNOSIS:   I48.9, Z01.810          X   MAG    X   PHOS       X   BMP             X   CBC    X   PT            X   PTT              ? PLEASE CALL ABNORMAL RESULTS TO THE REQUESTING PHYSICIAN? ATTENTION PATIENTS:    You do not have to fast for the lab work. You must go to the [facility], [address] [place] [address] or [facility]          Physician Signature:_____________________Date:_________Time:____________                                  Mary Parada Casandra:   [address] [place] [address]  Patient Name: Dagmar Myrick  : 1940  MRN# [id number]  Home Phone Number: [phone]   Weight:    Wt Readings from Last 3 Encounters:   10/26/22 206 lb 12.8 oz (93.8 kg)   10/23/22 203 lb (92.1 kg)   22 206 lb 12.8 oz (93.8 kg)    Insurance: Payor: MEDICARE / Plan: MEDICARE PART A AND B / Product Type: *No Product type* /     Date of Procedure: 2022 Time: 1100 Arrival Time: 0900    Diagnosis:  Atrial fibrillation   Allergies:    Allergies   Allergen Reactions    Latex Hives    Amlodipine Nausea Only    Gentamicin     Torsemide Other (See Comments)     Muscle pain - unable to move    Cozaar [Losartan] Swelling    Amlodipine Besylate Swelling     Muscle pains, swelling, cough    Ciprofloxacin Other (See Comments)     Affected tendons    Codeine      Migraines     Contrast [Iodides]     Iodine     Levaquin [Levofloxacin]      Tender Pain     Losartan Potassium     Macrodantin [Nitrofurantoin Macrocrystal]     Metoprolol      Weakness     Nutritional Supplements     Penicillins Hives     Throat Closure     Wasp Venom     Xarelto [Rivaroxaban] Other (See Comments)     States becomes hypertensive; heavy rectal bleeding    Zocor [Simvastatin - High Dose]      Migraines, Muscle Pain      [place] AMADOU Angel                           IMPORTANT NOTICE TO PATIENTS: Prior Authorization  We will contact your insurance for prior authorization for the CPT code related to the procedure it is the patient's responsibility to contact their insurance to ensure they are following their medical plans provisions or requirements! PATIENT INFORMATION ON PACEMAKER, ICD OR REVEAL LINQ    You will receive a monitor in the mail or at implant to do home checks on your device. You will have a site check 10 days after implant and then a 1 month office visit with device check. You will then receive a schedule in the mail to do your home checks with your monitor. These checks are scheduled every 3 months. The checks are scheduled on a Sunday and can be done at any time during the day. Your insurance is billed for these checks. You will have 2 charges. One will be for the remote check and the other is for the doctor reading the report. If you have a Reveal Linq Recorder, your insurance will be billed every 45 days for the report. This also has 2 charges, one for the remote check and   one for the doctor reading the report. This office has no idea what each patients insurance will pay for these charges as everyone has different insurance companies and different deductibles to meet. Please feel free to check with your insurance company concerning your out of pocket expense. If you have these checks done at the office, you will still have the charges for the check and one for the doctor reading the report. If any other questions concerning the devices or how the checks will work. Please call [phone] ask for Abi Baum.

## 2022-10-26 NOTE — PATIENT INSTRUCTIONS
Please be informed that if you contact our office outside of normal business hours the physician on call cannot help with any scheduling or rescheduling issues, procedure instruction questions or any type of medication issue. We advise you for any urgent/emergency that you go to the nearest emergency room! PLEASE CALL OUR OFFICE DURING NORMAL BUSINESS HOURS    Monday - Friday   8 am to 5 pm    [place] [address]: [phone]    [place]:  [phone]    **It is YOUR responsibilty to bring medication bottles and/or updated medication list to [address] [place] [address]. This will allow us to better serve you and all your healthcare needs**    Thank you for allowing us to care for you today! We want to ensure we can follow your treatment plan and we strive to give you the best outcomes and experience possible. If you ever have a life threatening emergency and call 911 - for an ambulance (EMS)   Our providers can only care for you at:   [facility] or [facility]. Even if you have someone take you or you drive yourself we can only care for you in a [facility] facility. Our providers are not setup at the other healthcare locations!

## 2022-10-31 ENCOUNTER — NURSE ONLY (OUTPATIENT)
Dept: CARDIOLOGY CLINIC | Age: 82
End: 2022-10-31

## 2022-10-31 ENCOUNTER — HOSPITAL ENCOUNTER (OUTPATIENT)
Age: 82
Setting detail: SPECIMEN
Discharge: HOME | End: 2022-10-31
Payer: MEDICARE

## 2022-10-31 ENCOUNTER — HOSPITAL ENCOUNTER (OUTPATIENT)
Age: 82
Discharge: HOME | End: 2022-10-31
Payer: MEDICARE

## 2022-10-31 DIAGNOSIS — I48.19 ATRIAL FIBRILLATION, PERSISTENT (HCC): Primary | ICD-10-CM

## 2022-10-31 LAB
ANION GAP SERPL CALCULATED.3IONS-SCNC: 12 MMOL/L (ref 4–16)
APTT: 27.1 SECONDS (ref 25.1–37.1)
BASOPHILS ABSOLUTE: 0.1 K/CU MM
BASOPHILS RELATIVE PERCENT: 1 % (ref 0–1)
BUN BLDV-MCNC: 20 MG/DL (ref 6–23)
CALCIUM SERPL-MCNC: 9.2 MG/DL (ref 8.3–10.6)
CHLORIDE BLD-SCNC: 104 MMOL/L (ref 99–110)
CO2: 26 MMOL/L (ref 21–32)
CREAT SERPL-MCNC: 0.7 MG/DL (ref 0.6–1.1)
DIFFERENTIAL TYPE: ABNORMAL
EOSINOPHILS ABSOLUTE: 0.2 K/CU MM
EOSINOPHILS RELATIVE PERCENT: 2.7 % (ref 0–3)
GFR SERPL CREATININE-BSD FRML MDRD: >60 ML/MIN/1.73M2
GLUCOSE BLD-MCNC: 90 MG/DL (ref 70–99)
HCT VFR BLD CALC: 47.2 % (ref 37–47)
HEMOGLOBIN: 14.9 GM/DL (ref 12.5–16)
IMMATURE NEUTROPHIL %: 0.4 % (ref 0–0.43)
INR BLD: 0.87 INDEX
LYMPHOCYTES ABSOLUTE: 1.6 K/CU MM
LYMPHOCYTES RELATIVE PERCENT: 19.5 % (ref 24–44)
MAGNESIUM: 2.2 MG/DL (ref 1.8–2.4)
MCH RBC QN AUTO: 29.4 PG (ref 27–31)
MCHC RBC AUTO-ENTMCNC: 31.6 % (ref 32–36)
MCV RBC AUTO: 93.3 FL (ref 78–100)
MONOCYTES ABSOLUTE: 0.6 K/CU MM
MONOCYTES RELATIVE PERCENT: 7.7 % (ref 0–4)
NUCLEATED RBC %: 0 %
PDW BLD-RTO: 13.8 % (ref 11.7–14.9)
PHOSPHORUS: 3.5 MG/DL (ref 2.5–4.9)
PLATELET # BLD: 286 K/CU MM (ref 140–440)
PMV BLD AUTO: 11.4 FL (ref 7.5–11.1)
POTASSIUM SERPL-SCNC: 4.4 MMOL/L (ref 3.5–5.1)
PROTHROMBIN TIME: 11.2 SECONDS (ref 11.7–14.5)
RBC # BLD: 5.06 M/CU MM (ref 4.2–5.4)
SEGMENTED NEUTROPHILS ABSOLUTE COUNT: 5.5 K/CU MM
SEGMENTED NEUTROPHILS RELATIVE PERCENT: 68.7 % (ref 36–66)
SODIUM BLD-SCNC: 142 MMOL/L (ref 135–145)
TOTAL IMMATURE NEUTOROPHIL: 0.03 K/CU MM
TOTAL NUCLEATED RBC: 0 K/CU MM
WBC # BLD: 8.1 K/CU MM (ref 4–10.5)

## 2022-10-31 PROCEDURE — 84100 ASSAY OF PHOSPHORUS: CPT

## 2022-10-31 PROCEDURE — U0003 INFECTIOUS AGENT DETECTION BY NUCLEIC ACID (DNA OR RNA); SEVERE ACUTE RESPIRATORY SYNDROME CORONAVIRUS 2 (SARS-COV-2) (CORONAVIRUS DISEASE [COVID-19]), AMPLIFIED PROBE TECHNIQUE, MAKING USE OF HIGH THROUGHPUT TECHNOLOGIES AS DESCRIBED BY CMS-2020-01-R: HCPCS

## 2022-10-31 PROCEDURE — 85610 PROTHROMBIN TIME: CPT

## 2022-10-31 PROCEDURE — 85025 COMPLETE CBC W/AUTO DIFF WBC: CPT

## 2022-10-31 PROCEDURE — 80048 BASIC METABOLIC PNL TOTAL CA: CPT

## 2022-10-31 PROCEDURE — 36415 COLL VENOUS BLD VENIPUNCTURE: CPT

## 2022-10-31 PROCEDURE — 83735 ASSAY OF MAGNESIUM: CPT

## 2022-10-31 PROCEDURE — 85730 THROMBOPLASTIN TIME PARTIAL: CPT

## 2022-10-31 PROCEDURE — U0005 INFEC AGEN DETEC AMPLI PROBE: HCPCS

## 2022-10-31 NOTE — PROGRESS NOTES
Patient here in office and educated on Loop Implant, schedule for 11/2/2022 @ 1100, with arrival @ 0900, @ [facility]; risk explained; and consents signed. Also copy of orders given for labs and CXR due 10/31/2022 at [facility]. Instruction given to patient to : May eat a light break, do not eat / drink anything  4 hours prior to procedure; call hospital at [phone] to pre-register. May take rest of morning meds of procedure. Patient voiced understanding. Copies of consent & info scanned in justin      Patient performed COVID throat swab for 10 seconds in throat  Patient was wearing a mask and provided gloves and tissues. This CMA, LPN, RN present in the room, 6 feet away. Patient dropped swab in  labeled collection tube. Lab order, facesheet and copy of insurance card placed in collection bag. Bag placed in biohazard bag and placed in fridge.  called for .

## 2022-11-02 ENCOUNTER — HOSPITAL ENCOUNTER (OUTPATIENT)
Dept: CARDIAC CATH/INVASIVE PROCEDURES | Age: 82
Discharge: HOME | End: 2022-11-02
Attending: INTERNAL MEDICINE | Admitting: INTERNAL MEDICINE
Payer: MEDICARE

## 2022-11-02 VITALS
DIASTOLIC BLOOD PRESSURE: 86 MMHG | OXYGEN SATURATION: 98 % | RESPIRATION RATE: 18 BRPM | HEIGHT: 64 IN | TEMPERATURE: 96.2 F | SYSTOLIC BLOOD PRESSURE: 166 MMHG | BODY MASS INDEX: 35.17 KG/M2 | WEIGHT: 206 LBS | HEART RATE: 82 BPM

## 2022-11-02 PROBLEM — I48.0 PAF (PAROXYSMAL ATRIAL FIBRILLATION) (HCC): Status: ACTIVE | Noted: 2022-11-02

## 2022-11-02 LAB
SARS-COV-2: NOT DETECTED
SOURCE: NORMAL

## 2022-11-02 PROCEDURE — 2709999900 HC NON-CHARGEABLE SUPPLY

## 2022-11-02 PROCEDURE — C1764 EVENT RECORDER, CARDIAC: HCPCS

## 2022-11-02 PROCEDURE — 33285 INSJ SUBQ CAR RHYTHM MNTR: CPT | Performed by: INTERNAL MEDICINE

## 2022-11-02 PROCEDURE — 33285 INSJ SUBQ CAR RHYTHM MNTR: CPT
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
## 2022-11-02 NOTE — PROGRESS NOTES
All discharge instructions explained to the patient at this time. No bleeding or hematoma noted along site. Patient walked to the bathroom without difficulty and IV removed per discharge orders. Patient denies any additional needs and all vitals stable for discharge home.

## 2022-11-02 NOTE — DISCHARGE INSTRUCTIONS
KEEP dressing dry and intact until seen by Dr. Margarett Najjar office. Monitor for infection (fever, chills, drainage, redness, or swelling) and bleeding. Hold pressure for any bleeding. No driving for 24 hours.

## 2022-11-02 NOTE — OP NOTE
Operative Note                                           [facility]     Electrophysiology Procedure Note       Date of Procedure: 11/2/2022  Patient's Name: Nichole Aj  YOB: 1940   Medical Record Number: [id number]    Procedure Performed by: Eliud Morales MD     Procedures performed:    Loop recorder implantation  Electronic analysis and programming of the device        Indication of the procedure: PAF monitoring    Nichole Aj is a [de-identified] y.o. female with history of atrial fibrillation ablation here for loop recorder for PAF monitoring    Details of procedure:   Procedure's risks, benefits and alternatives of procedure were explained to patient. All questions were answered. Patient understood and informed consent was obtained. The patient was brought to the electrophysiology lab in a fasting nonsedated state. He was prepped and draped in usual sterile fashion. After injection of 2% lidocaine in the left pectoral area, Using the incision tool ([employer] device), a small incision was made on the left side of sternum between third and fourth intercostal space. We then used the deployment tool to deploy the device subcutaneously. Pressure was held for hemostasis post device placement. The skin was covered with Steri-Strips and pressure dressing. The patient tolerated the procedure well and there were no complications. Patient was transported to the holding area in stable condition. Device information:             Plan:   The patient will have usual post-implant care. Patient can be discharged home if remains stable with follow up in arrhythmia clinic.         Electronically signed by Braeden Stewart MD on 11/2/2022 at 5:02 PM

## 2022-11-07 ENCOUNTER — OFFICE VISIT (OUTPATIENT)
Dept: CARDIOLOGY CLINIC | Age: 82
End: 2022-11-07
Payer: MEDICARE

## 2022-11-07 VITALS
WEIGHT: 207 LBS | HEART RATE: 82 BPM | HEIGHT: 64 IN | SYSTOLIC BLOOD PRESSURE: 132 MMHG | DIASTOLIC BLOOD PRESSURE: 74 MMHG | BODY MASS INDEX: 35.34 KG/M2

## 2022-11-07 DIAGNOSIS — I48.0 PAF (PAROXYSMAL ATRIAL FIBRILLATION) (HCC): ICD-10-CM

## 2022-11-07 DIAGNOSIS — I48.19 ATRIAL FIBRILLATION, PERSISTENT (HCC): Primary | ICD-10-CM

## 2022-11-07 DIAGNOSIS — Z86.79 S/P ABLATION OF ATRIAL FIBRILLATION: ICD-10-CM

## 2022-11-07 DIAGNOSIS — Z98.890 S/P ABLATION OF ATRIAL FIBRILLATION: ICD-10-CM

## 2022-11-07 PROCEDURE — 1123F ACP DISCUSS/DSCN MKR DOCD: CPT | Performed by: INTERNAL MEDICINE

## 2022-11-07 PROCEDURE — 99214 OFFICE O/P EST MOD 30 MIN: CPT | Performed by: INTERNAL MEDICINE

## 2022-11-07 PROCEDURE — G8400 PT W/DXA NO RESULTS DOC: HCPCS | Performed by: INTERNAL MEDICINE

## 2022-11-07 PROCEDURE — G8427 DOCREV CUR MEDS BY ELIG CLIN: HCPCS | Performed by: INTERNAL MEDICINE

## 2022-11-07 PROCEDURE — G8484 FLU IMMUNIZE NO ADMIN: HCPCS | Performed by: INTERNAL MEDICINE

## 2022-11-07 PROCEDURE — 3074F SYST BP LT 130 MM HG: CPT | Performed by: INTERNAL MEDICINE

## 2022-11-07 PROCEDURE — 3078F DIAST BP <80 MM HG: CPT | Performed by: INTERNAL MEDICINE

## 2022-11-07 PROCEDURE — 1090F PRES/ABSN URINE INCON ASSESS: CPT | Performed by: INTERNAL MEDICINE

## 2022-11-07 PROCEDURE — 1036F TOBACCO NON-USER: CPT | Performed by: INTERNAL MEDICINE

## 2022-11-07 PROCEDURE — G8417 CALC BMI ABV UP PARAM F/U: HCPCS | Performed by: INTERNAL MEDICINE
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
## 2022-11-08 ENCOUNTER — TELEPHONE (OUTPATIENT)
Dept: CARDIOLOGY CLINIC | Age: 82
End: 2022-11-08

## 2022-11-08 NOTE — TELEPHONE ENCOUNTER
called with just had loop recorder placed , she was given a monitor when she was discharged , company telling  she has the wrong monitor

## 2022-11-09 NOTE — TELEPHONE ENCOUNTER
Patient and  called and said that [employer] called them and told them that they had the wrong monitor and to put it back in the box. I advised patient and  that a mistake had been made and we are trying to get it straightened out. Advised them that the loop recorder and monitor are assigned to one another and that it is the correct monitor for the loop that is implanted in patient. Advised him to plug monitor back up and that I will keep him updated on getting this fixed.

## 2022-11-11 ENCOUNTER — NURSE ONLY (OUTPATIENT)
Dept: CARDIOLOGY CLINIC | Age: 82
End: 2022-11-11

## 2022-11-11 VITALS — TEMPERATURE: 97.5 F

## 2022-11-11 DIAGNOSIS — Z95.818 STATUS POST PLACEMENT OF IMPLANTABLE LOOP RECORDER: Primary | ICD-10-CM

## 2022-11-11 PROCEDURE — 99024 POSTOP FOLLOW-UP VISIT: CPT | Performed by: INTERNAL MEDICINE

## 2022-11-11 NOTE — PROGRESS NOTES
Patient seen for site check post loop recorder implant. Dressing removed. No signs of inflammation or infection noted. Edges well approximated. Patient has no complaints of pain or discomfort.

## 2022-11-21 RX ORDER — FUROSEMIDE 20 MG/1
20 TABLET ORAL DAILY PRN
Qty: 30 TABLET | Refills: 2 | Status: SHIPPED | OUTPATIENT
Start: 2022-11-21

## 2022-12-01 ENCOUNTER — OFFICE VISIT (OUTPATIENT)
Dept: CARDIOLOGY CLINIC | Age: 82
End: 2022-12-01
Payer: MEDICARE

## 2022-12-01 VITALS
BODY MASS INDEX: 35.2 KG/M2 | SYSTOLIC BLOOD PRESSURE: 138 MMHG | DIASTOLIC BLOOD PRESSURE: 72 MMHG | HEIGHT: 64 IN | WEIGHT: 206.2 LBS

## 2022-12-01 DIAGNOSIS — K92.2 GASTROINTESTINAL HEMORRHAGE, UNSPECIFIED GASTROINTESTINAL HEMORRHAGE TYPE: ICD-10-CM

## 2022-12-01 DIAGNOSIS — Z95.818 STATUS POST PLACEMENT OF IMPLANTABLE LOOP RECORDER: Primary | ICD-10-CM

## 2022-12-01 DIAGNOSIS — I48.19 ATRIAL FIBRILLATION, PERSISTENT (HCC): ICD-10-CM

## 2022-12-01 PROCEDURE — G8427 DOCREV CUR MEDS BY ELIG CLIN: HCPCS | Performed by: NURSE PRACTITIONER

## 2022-12-01 PROCEDURE — G8417 CALC BMI ABV UP PARAM F/U: HCPCS | Performed by: NURSE PRACTITIONER

## 2022-12-01 PROCEDURE — 93000 ELECTROCARDIOGRAM COMPLETE: CPT | Performed by: NURSE PRACTITIONER

## 2022-12-01 PROCEDURE — 1036F TOBACCO NON-USER: CPT | Performed by: NURSE PRACTITIONER

## 2022-12-01 PROCEDURE — 1090F PRES/ABSN URINE INCON ASSESS: CPT | Performed by: NURSE PRACTITIONER

## 2022-12-01 PROCEDURE — 99214 OFFICE O/P EST MOD 30 MIN: CPT | Performed by: NURSE PRACTITIONER

## 2022-12-01 PROCEDURE — G8484 FLU IMMUNIZE NO ADMIN: HCPCS | Performed by: NURSE PRACTITIONER

## 2022-12-01 PROCEDURE — G8400 PT W/DXA NO RESULTS DOC: HCPCS | Performed by: NURSE PRACTITIONER

## 2022-12-01 PROCEDURE — 93291 INTERROG DEV EVAL SCRMS IP: CPT | Performed by: NURSE PRACTITIONER

## 2022-12-01 PROCEDURE — 1123F ACP DISCUSS/DSCN MKR DOCD: CPT | Performed by: NURSE PRACTITIONER

## 2022-12-01 PROCEDURE — 3074F SYST BP LT 130 MM HG: CPT | Performed by: NURSE PRACTITIONER

## 2022-12-01 PROCEDURE — 3078F DIAST BP <80 MM HG: CPT | Performed by: NURSE PRACTITIONER

## 2022-12-01 NOTE — PATIENT INSTRUCTIONS
Please be informed that if you contact our office outside of normal business hours the physician on call cannot help with any scheduling or rescheduling issues, procedure instruction questions or any type of medication issue. We advise you for any urgent/emergency that you go to the nearest emergency room! PLEASE CALL OUR OFFICE DURING NORMAL BUSINESS HOURS    Monday - Friday   8 am to 5 pm    [facility]: [phone]    [facility]:  [phone]      **It is YOUR responsibilty to bring medication bottles and/or updated medication list to [address] [place] [address]. This will allow us to better serve you and all your healthcare needs**    Thank you for allowing us to care for you today! We want to ensure we can follow your treatment plan and we strive to give you the best outcomes and experience possible. If you ever have a life threatening emergency and call 911 - for an ambulance (EMS)   Our providers can only care for you at:   [facility] or [facility]. Even if you have someone take you or you drive yourself we can only care for you in a [facility] facility. Our providers are not setup at the other healthcare locations!
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
## 2023-01-18 ENCOUNTER — PROCEDURE VISIT (OUTPATIENT)
Dept: CARDIOLOGY CLINIC | Age: 83
End: 2023-01-18

## 2023-01-18 DIAGNOSIS — I48.19 ATRIAL FIBRILLATION, PERSISTENT (HCC): Primary | ICD-10-CM

## 2023-02-05 PROCEDURE — G2066 INTER DEVC REMOTE 30D: HCPCS | Performed by: INTERNAL MEDICINE

## 2023-02-05 PROCEDURE — 93298 REM INTERROG DEV EVAL SCRMS: CPT | Performed by: INTERNAL MEDICINE

## 2023-02-07 ENCOUNTER — PROCEDURE VISIT (OUTPATIENT)
Dept: CARDIOLOGY CLINIC | Age: 83
End: 2023-02-07
Payer: MEDICARE

## 2023-02-07 DIAGNOSIS — I48.19 ATRIAL FIBRILLATION, PERSISTENT (HCC): ICD-10-CM

## 2023-02-23 RX ORDER — FUROSEMIDE 20 MG/1
20 TABLET ORAL DAILY PRN
Qty: 90 TABLET | Refills: 1 | Status: SHIPPED | OUTPATIENT
Start: 2023-02-23

## 2023-03-13 ENCOUNTER — OFFICE VISIT (OUTPATIENT)
Dept: CARDIOLOGY CLINIC | Age: 83
End: 2023-03-13
Payer: MEDICARE

## 2023-03-13 VITALS
OXYGEN SATURATION: 98 % | HEART RATE: 79 BPM | BODY MASS INDEX: 34.59 KG/M2 | HEIGHT: 64 IN | WEIGHT: 202.6 LBS | SYSTOLIC BLOOD PRESSURE: 132 MMHG | DIASTOLIC BLOOD PRESSURE: 82 MMHG

## 2023-03-13 DIAGNOSIS — K92.2 GASTROINTESTINAL HEMORRHAGE, UNSPECIFIED GASTROINTESTINAL HEMORRHAGE TYPE: ICD-10-CM

## 2023-03-13 DIAGNOSIS — I48.0 PAF (PAROXYSMAL ATRIAL FIBRILLATION) (HCC): ICD-10-CM

## 2023-03-13 DIAGNOSIS — Z95.818 STATUS POST PLACEMENT OF IMPLANTABLE LOOP RECORDER: Primary | ICD-10-CM

## 2023-03-13 PROCEDURE — G8484 FLU IMMUNIZE NO ADMIN: HCPCS | Performed by: NURSE PRACTITIONER

## 2023-03-13 PROCEDURE — 3075F SYST BP GE 130 - 139MM HG: CPT | Performed by: NURSE PRACTITIONER

## 2023-03-13 PROCEDURE — 1123F ACP DISCUSS/DSCN MKR DOCD: CPT | Performed by: NURSE PRACTITIONER

## 2023-03-13 PROCEDURE — G8417 CALC BMI ABV UP PARAM F/U: HCPCS | Performed by: NURSE PRACTITIONER

## 2023-03-13 PROCEDURE — G8427 DOCREV CUR MEDS BY ELIG CLIN: HCPCS | Performed by: NURSE PRACTITIONER

## 2023-03-13 PROCEDURE — 99214 OFFICE O/P EST MOD 30 MIN: CPT | Performed by: NURSE PRACTITIONER

## 2023-03-13 PROCEDURE — 93000 ELECTROCARDIOGRAM COMPLETE: CPT | Performed by: NURSE PRACTITIONER

## 2023-03-13 PROCEDURE — 1090F PRES/ABSN URINE INCON ASSESS: CPT | Performed by: NURSE PRACTITIONER

## 2023-03-13 PROCEDURE — G8400 PT W/DXA NO RESULTS DOC: HCPCS | Performed by: NURSE PRACTITIONER

## 2023-03-13 PROCEDURE — 1036F TOBACCO NON-USER: CPT | Performed by: NURSE PRACTITIONER

## 2023-03-13 PROCEDURE — 3079F DIAST BP 80-89 MM HG: CPT | Performed by: NURSE PRACTITIONER

## 2023-03-13 NOTE — PATIENT INSTRUCTIONS
Please be informed that if you contact our office outside of normal business hours the physician on call cannot help with any scheduling or rescheduling issues, procedure instruction questions or any type of medication issue. We advise you for any urgent/emergency that you go to the nearest emergency room! PLEASE CALL OUR OFFICE DURING NORMAL BUSINESS HOURS    Monday - Friday   8 am to 5 pm    [place] [address]: [phone]    [place]:  [phone]        **It is YOUR responsibilty to bring medication bottles and/or updated medication list to [address] [place] [address]. This will allow us to better serve you and all your healthcare needs**      Thank you for allowing us to care for you today! We want to ensure we can follow your treatment plan and we strive to give you the best outcomes and experience possible. If you ever have a life threatening emergency and call 911 - for an ambulance (EMS)   Our providers can only care for you at:   [facility] or LTAC, located within [facility]. Even if you have someone take you or you drive yourself we can only care for you in a [facility] facility. Our providers are not setup at the other healthcare locations!
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
## 2023-04-26 ENCOUNTER — PROCEDURE VISIT (OUTPATIENT)
Dept: CARDIOLOGY CLINIC | Age: 83
End: 2023-04-26

## 2023-04-26 DIAGNOSIS — I48.0 PAF (PAROXYSMAL ATRIAL FIBRILLATION) (HCC): ICD-10-CM

## 2023-04-26 DIAGNOSIS — Z45.09 ENCOUNTER FOR LOOP RECORDER CHECK: Primary | ICD-10-CM

## 2023-05-10 ENCOUNTER — OFFICE VISIT (OUTPATIENT)
Dept: CARDIOLOGY CLINIC | Age: 83
End: 2023-05-10
Payer: MEDICARE

## 2023-05-10 VITALS
HEART RATE: 80 BPM | HEIGHT: 64 IN | BODY MASS INDEX: 35 KG/M2 | SYSTOLIC BLOOD PRESSURE: 138 MMHG | WEIGHT: 205 LBS | DIASTOLIC BLOOD PRESSURE: 84 MMHG

## 2023-05-10 DIAGNOSIS — I87.2 VENOUS (PERIPHERAL) INSUFFICIENCY: ICD-10-CM

## 2023-05-10 DIAGNOSIS — E78.2 MIXED HYPERLIPIDEMIA: ICD-10-CM

## 2023-05-10 DIAGNOSIS — I48.0 PAF (PAROXYSMAL ATRIAL FIBRILLATION) (HCC): Primary | ICD-10-CM

## 2023-05-10 DIAGNOSIS — I10 PRIMARY HYPERTENSION: ICD-10-CM

## 2023-05-10 PROCEDURE — G8428 CUR MEDS NOT DOCUMENT: HCPCS | Performed by: NURSE PRACTITIONER

## 2023-05-10 PROCEDURE — G8417 CALC BMI ABV UP PARAM F/U: HCPCS | Performed by: NURSE PRACTITIONER

## 2023-05-10 PROCEDURE — 3079F DIAST BP 80-89 MM HG: CPT | Performed by: NURSE PRACTITIONER

## 2023-05-10 PROCEDURE — 3075F SYST BP GE 130 - 139MM HG: CPT | Performed by: NURSE PRACTITIONER

## 2023-05-10 PROCEDURE — G8400 PT W/DXA NO RESULTS DOC: HCPCS | Performed by: NURSE PRACTITIONER

## 2023-05-10 PROCEDURE — 99214 OFFICE O/P EST MOD 30 MIN: CPT | Performed by: NURSE PRACTITIONER

## 2023-05-10 PROCEDURE — 1123F ACP DISCUSS/DSCN MKR DOCD: CPT | Performed by: NURSE PRACTITIONER

## 2023-05-10 PROCEDURE — 1090F PRES/ABSN URINE INCON ASSESS: CPT | Performed by: NURSE PRACTITIONER

## 2023-05-10 PROCEDURE — 1036F TOBACCO NON-USER: CPT | Performed by: NURSE PRACTITIONER

## 2023-05-10 RX ORDER — FUROSEMIDE 20 MG/1
20 TABLET ORAL DAILY PRN
Qty: 90 TABLET | Refills: 1 | Status: SHIPPED | OUTPATIENT
Start: 2023-05-10

## 2023-05-10 RX ORDER — BENZONATATE 100 MG/1
100 CAPSULE ORAL 3 TIMES DAILY PRN
Qty: 90 CAPSULE | Refills: 3 | Status: SHIPPED | OUTPATIENT
Start: 2023-05-10

## 2023-05-10 ASSESSMENT — ENCOUNTER SYMPTOMS: SHORTNESS OF BREATH: 0

## 2023-05-10 NOTE — PROGRESS NOTES
[facility] ([facility] PHYSICAL REHABILITATION [facility] [address] [facility]  Phone: [phone]    Fax [phone]                  Lupe Cabrera MD, Florentino Stapleton MD, Sabine Jung MD, MD Bhupinder Valles MD, Autumn Villalba MD, Kendra Quick MD, [address] [facility]        Cardiology Progress Note      5/10/2023    RE: Floyd Tom  ([birth year])                             Primary cardiologist: Dr. Bhupinder Jackson       Subjective:  CC:   1. PAF (paroxysmal atrial fibrillation) (HCC)    2. Venous (peripheral) insufficiency    3. Mixed hyperlipidemia    4. Primary hypertension        HPI: Floyd Tom, who is a  [de-identified]y.o. year old female with a past medical history as listed below. Patient presents to the office for follow up on PAF, HTN, venous insufficiency, and hyperlipidemia. Patient has loop recoreder and has not had any atrial  fib. Patient is an active female who walks regularly. Patient is  compliant with medications. Patient denies any chest pain, shortness of breath, dizziness, syncope, or palpitations. Past Medical History:   Diagnosis Date    Asthma     Atrial fibrillation ([address] 2020    Bronchitis, asthmatic     Chest pain     Atypical    Fibromyalgia     H/O cardiac catheterization 08/02/1991    Patent coronary aterties with preserved left ventricular funciton. H/O cardiovascular stress test     10/12 Normal EF70%,11/17/09 ([place]) Normal pattern of perfusion in all regions. Post stress left venticle is normal in size. Normal Study. Normal perfusion in the distribution of all coronaries. Normal LV size and funciton. Rest EF is 70%. Global left ventricular systolic function is normal. Exercise capacity 7 METS. Exercise capacity is normal. 6/98, 6/99, 8/07,     H/O cardiovascular stress test 07/11/2019    Normal study.     H/O complete electrocardiogram

## 2023-05-10 NOTE — PROGRESS NOTES
Atrial Fibrillation CHADSVASC2 Score Stroke Risk:   [de-identified] y.o. > 76 - 2    female Female - 1   CHF HX: No - 0   HTN HX: Yes - 1   Stroke/TIA/Thromboembolism No - 0   Vascular Disease HX: No - 0   Diabetes Mellitus No - 0   CHADSVASC 2 Score 4      Annual Stroke Risk 4.8%- moderate-high

## 2023-05-11 ENCOUNTER — PROCEDURE VISIT (OUTPATIENT)
Dept: CARDIOLOGY CLINIC | Age: 83
End: 2023-05-11

## 2023-05-11 DIAGNOSIS — I48.0 PAF (PAROXYSMAL ATRIAL FIBRILLATION) (HCC): Primary | ICD-10-CM

## 2023-05-11 DIAGNOSIS — Z45.09 ENCOUNTER FOR LOOP RECORDER CHECK: ICD-10-CM

## 2023-06-24 PROCEDURE — 93298 REM INTERROG DEV EVAL SCRMS: CPT | Performed by: INTERNAL MEDICINE

## 2023-06-24 PROCEDURE — G2066 INTER DEVC REMOTE 30D: HCPCS | Performed by: INTERNAL MEDICINE

## 2023-07-19 ENCOUNTER — PROCEDURE VISIT (OUTPATIENT)
Dept: CARDIOLOGY CLINIC | Age: 83
End: 2023-07-19
Payer: MEDICARE

## 2023-07-19 DIAGNOSIS — I48.0 PAF (PAROXYSMAL ATRIAL FIBRILLATION) (HCC): ICD-10-CM

## 2023-07-19 DIAGNOSIS — Z45.09 ENCOUNTER FOR LOOP RECORDER CHECK: Primary | ICD-10-CM

## 2023-08-11 ENCOUNTER — PROCEDURE VISIT (OUTPATIENT)
Dept: CARDIOLOGY CLINIC | Age: 83
End: 2023-08-11

## 2023-08-11 DIAGNOSIS — I48.91 ATRIAL FIBRILLATION, UNSPECIFIED TYPE (HCC): Primary | ICD-10-CM

## 2023-08-11 DIAGNOSIS — Z45.09 ENCOUNTER FOR LOOP RECORDER CHECK: ICD-10-CM

## 2023-08-11 DIAGNOSIS — I48.0 PAF (PAROXYSMAL ATRIAL FIBRILLATION) (HCC): ICD-10-CM

## 2023-08-11 DIAGNOSIS — Z45.09 ENCOUNTER FOR ELECTRONIC ANALYSIS OF REVEAL EVENT RECORDER: ICD-10-CM

## 2023-08-16 ENCOUNTER — HOSPITAL ENCOUNTER (EMERGENCY)
Age: 83
Discharge: HOME | End: 2023-08-16
Attending: EMERGENCY MEDICINE
Payer: MEDICARE

## 2023-08-16 VITALS
HEIGHT: 64 IN | DIASTOLIC BLOOD PRESSURE: 83 MMHG | RESPIRATION RATE: 16 BRPM | BODY MASS INDEX: 35 KG/M2 | OXYGEN SATURATION: 97 % | TEMPERATURE: 98 F | HEART RATE: 87 BPM | WEIGHT: 205 LBS | SYSTOLIC BLOOD PRESSURE: 170 MMHG

## 2023-08-16 DIAGNOSIS — T14.8XXA BLISTER: Primary | ICD-10-CM

## 2023-08-16 PROCEDURE — 99283 EMERGENCY DEPT VISIT LOW MDM: CPT

## 2023-08-16 RX ORDER — CEPHALEXIN 250 MG/1
250 CAPSULE ORAL 2 TIMES DAILY
Qty: 14 CAPSULE | Refills: 0 | Status: SHIPPED | OUTPATIENT
Start: 2023-08-16 | End: 2023-08-23

## 2023-08-16 ASSESSMENT — PAIN - FUNCTIONAL ASSESSMENT: PAIN_FUNCTIONAL_ASSESSMENT: NONE - DENIES PAIN

## 2023-08-16 NOTE — ED TRIAGE NOTES
Pt reports insect  bite to right leg since Monday reports she has been putting some save on it and now has a large fluid filled blister

## 2023-08-16 NOTE — ED NOTES
Discharge instructions given with prescription verbalized understanding pt is ambulatory out       Evelyn Lim RN  08/16/23 0508

## 2023-08-16 NOTE — ED NOTES
Pt reports possible insect bite to right lower leg states she has been putting save on it and now its blistered up   Fluid filled blister noted to right lower leg upper calf area close to knee size of a quarter  no redness noted      Marine Persaud RN  08/16/23 [id number]

## 2023-08-16 NOTE — ED NOTES
Dr Cordelia Simmons at bedside and popped blister with sterile needle clear drainage noted  Non stick dressing applied with wound care at home verbalized understanding      Mihir Calderon RN  08/16/23 [id number]

## 2023-11-13 ENCOUNTER — OFFICE VISIT (OUTPATIENT)
Dept: CARDIOLOGY CLINIC | Age: 83
End: 2023-11-13
Payer: MEDICARE

## 2023-11-13 VITALS
BODY MASS INDEX: 36.14 KG/M2 | DIASTOLIC BLOOD PRESSURE: 74 MMHG | WEIGHT: 204 LBS | HEIGHT: 63 IN | SYSTOLIC BLOOD PRESSURE: 130 MMHG | HEART RATE: 72 BPM

## 2023-11-13 DIAGNOSIS — E78.2 MIXED HYPERLIPIDEMIA: ICD-10-CM

## 2023-11-13 DIAGNOSIS — I48.91 ATRIAL FIBRILLATION, UNSPECIFIED TYPE (HCC): Primary | ICD-10-CM

## 2023-11-13 DIAGNOSIS — Z86.79 S/P ABLATION OF ATRIAL FIBRILLATION: ICD-10-CM

## 2023-11-13 DIAGNOSIS — I48.0 PAF (PAROXYSMAL ATRIAL FIBRILLATION) (HCC): ICD-10-CM

## 2023-11-13 DIAGNOSIS — I10 PRIMARY HYPERTENSION: ICD-10-CM

## 2023-11-13 DIAGNOSIS — K92.2 GASTROINTESTINAL HEMORRHAGE, UNSPECIFIED GASTROINTESTINAL HEMORRHAGE TYPE: ICD-10-CM

## 2023-11-13 DIAGNOSIS — Z98.890 S/P ABLATION OF ATRIAL FIBRILLATION: ICD-10-CM

## 2023-11-13 DIAGNOSIS — Z95.818 STATUS POST PLACEMENT OF IMPLANTABLE LOOP RECORDER: ICD-10-CM

## 2023-11-13 DIAGNOSIS — I48.19 ATRIAL FIBRILLATION, PERSISTENT (HCC): ICD-10-CM

## 2023-11-13 PROCEDURE — 1036F TOBACCO NON-USER: CPT | Performed by: INTERNAL MEDICINE

## 2023-11-13 PROCEDURE — G8417 CALC BMI ABV UP PARAM F/U: HCPCS | Performed by: INTERNAL MEDICINE

## 2023-11-13 PROCEDURE — 1090F PRES/ABSN URINE INCON ASSESS: CPT | Performed by: INTERNAL MEDICINE

## 2023-11-13 PROCEDURE — 3075F SYST BP GE 130 - 139MM HG: CPT | Performed by: INTERNAL MEDICINE

## 2023-11-13 PROCEDURE — 1123F ACP DISCUSS/DSCN MKR DOCD: CPT | Performed by: INTERNAL MEDICINE

## 2023-11-13 PROCEDURE — G8427 DOCREV CUR MEDS BY ELIG CLIN: HCPCS | Performed by: INTERNAL MEDICINE

## 2023-11-13 PROCEDURE — 3078F DIAST BP <80 MM HG: CPT | Performed by: INTERNAL MEDICINE

## 2023-11-13 PROCEDURE — G8484 FLU IMMUNIZE NO ADMIN: HCPCS | Performed by: INTERNAL MEDICINE

## 2023-11-13 PROCEDURE — G8400 PT W/DXA NO RESULTS DOC: HCPCS | Performed by: INTERNAL MEDICINE

## 2023-11-13 PROCEDURE — 99214 OFFICE O/P EST MOD 30 MIN: CPT | Performed by: INTERNAL MEDICINE

## 2023-11-13 RX ORDER — FUROSEMIDE 20 MG/1
20 TABLET ORAL DAILY PRN
Qty: 90 TABLET | Refills: 1 | Status: SHIPPED | OUTPATIENT
Start: 2023-11-13

## 2023-11-13 RX ORDER — POTASSIUM CHLORIDE 750 MG/1
10 TABLET, EXTENDED RELEASE ORAL DAILY
Qty: 30 TABLET | Refills: 3 | Status: SHIPPED | OUTPATIENT
Start: 2023-11-13

## 2023-11-13 NOTE — PATIENT INSTRUCTIONS
We are committed to providing you the best care possible. If you receive a survey after visiting one of our offices, please take time to share your experience concerning your physician office visit. These surveys are confidential and no health information about you is shared. We are eager to improve for you and we are counting on your feedback to help make that happen. Please be informed that if you contact our office outside of normal business hours the physician on call cannot help with any scheduling or rescheduling issues, procedure instruction questions or any type of medication issue. We advise you for any urgent/emergency that you go to the nearest emergency room! PLEASE CALL OUR OFFICE DURING NORMAL BUSINESS HOURS    Monday - Friday   8 am to 5 pm    [place]: [address] [place]: [phone]    [place]:  [phone]  Thank you for allowing us to care for you today! We want to ensure we can follow your treatment plan and we strive to give you the best outcomes and experience possible. If you ever have a life threatening emergency and call 911 - for an ambulance (EMS)   Our providers can only care for you at:   [facility] or [facility]. Even if you have someone take you or you drive yourself we can only care for you in a [facility] facility. Our providers are not setup at the other healthcare locations! **It is YOUR responsibilty to bring medication bottles and/or updated medication list to [address] [facility].  This will allow us to better serve you and all your healthcare needs**

## 2024-01-09 ENCOUNTER — PROCEDURE VISIT (OUTPATIENT)
Dept: CARDIOLOGY CLINIC | Age: 84
End: 2024-01-09

## 2024-01-09 DIAGNOSIS — I48.0 PAF (PAROXYSMAL ATRIAL FIBRILLATION) (HCC): ICD-10-CM

## 2024-01-09 DIAGNOSIS — I48.91 ATRIAL FIBRILLATION, UNSPECIFIED TYPE (HCC): Primary | ICD-10-CM

## 2024-01-09 DIAGNOSIS — Z45.09 ENCOUNTER FOR ELECTRONIC ANALYSIS OF REVEAL EVENT RECORDER: ICD-10-CM

## 2024-01-09 DIAGNOSIS — Z45.09 ENCOUNTER FOR LOOP RECORDER CHECK: ICD-10-CM

## 2024-02-07 ENCOUNTER — PROCEDURE VISIT (OUTPATIENT)
Dept: CARDIOLOGY CLINIC | Age: 84
End: 2024-02-07

## 2024-02-07 DIAGNOSIS — I48.0 PAF (PAROXYSMAL ATRIAL FIBRILLATION) (HCC): ICD-10-CM

## 2024-02-07 DIAGNOSIS — Z45.09 ENCOUNTER FOR ELECTRONIC ANALYSIS OF REVEAL EVENT RECORDER: ICD-10-CM

## 2024-02-07 DIAGNOSIS — Z45.09 ENCOUNTER FOR LOOP RECORDER CHECK: ICD-10-CM

## 2024-02-07 DIAGNOSIS — I48.91 ATRIAL FIBRILLATION, UNSPECIFIED TYPE (HCC): Primary | ICD-10-CM

## 2024-03-20 ENCOUNTER — PROCEDURE VISIT (OUTPATIENT)
Dept: CARDIOLOGY CLINIC | Age: 84
End: 2024-03-20
Payer: MEDICARE

## 2024-03-20 DIAGNOSIS — Z45.09 ENCOUNTER FOR LOOP RECORDER CHECK: ICD-10-CM

## 2024-03-20 DIAGNOSIS — I48.0 PAF (PAROXYSMAL ATRIAL FIBRILLATION) (HCC): ICD-10-CM

## 2024-03-20 DIAGNOSIS — I48.91 ATRIAL FIBRILLATION, UNSPECIFIED TYPE (HCC): Primary | ICD-10-CM

## 2024-03-20 DIAGNOSIS — Z45.09 ENCOUNTER FOR ELECTRONIC ANALYSIS OF REVEAL EVENT RECORDER: ICD-10-CM

## 2024-03-20 PROCEDURE — 93298 REM INTERROG DEV EVAL SCRMS: CPT | Performed by: INTERNAL MEDICINE

## 2024-05-06 ENCOUNTER — PROCEDURE VISIT (OUTPATIENT)
Dept: CARDIOLOGY CLINIC | Age: 84
End: 2024-05-06

## 2024-05-06 ENCOUNTER — PROCEDURE VISIT (OUTPATIENT)
Dept: PHYSICAL MEDICINE AND REHAB | Age: 84
End: 2024-05-06
Payer: MEDICARE

## 2024-05-06 DIAGNOSIS — R20.2 PARESTHESIA AND PAIN OF BOTH UPPER EXTREMITIES: ICD-10-CM

## 2024-05-06 DIAGNOSIS — Z45.09 ENCOUNTER FOR ELECTRONIC ANALYSIS OF REVEAL EVENT RECORDER: ICD-10-CM

## 2024-05-06 DIAGNOSIS — M79.602 PARESTHESIA AND PAIN OF BOTH UPPER EXTREMITIES: ICD-10-CM

## 2024-05-06 DIAGNOSIS — M79.601 PARESTHESIA AND PAIN OF BOTH UPPER EXTREMITIES: ICD-10-CM

## 2024-05-06 DIAGNOSIS — Q07.8 MARTIN-GRUBER ANASTOMOSIS (HCC): ICD-10-CM

## 2024-05-06 DIAGNOSIS — G56.03 BILATERAL CARPAL TUNNEL SYNDROME: Primary | ICD-10-CM

## 2024-05-06 DIAGNOSIS — Z45.09 ENCOUNTER FOR LOOP RECORDER CHECK: ICD-10-CM

## 2024-05-06 DIAGNOSIS — I48.91 ATRIAL FIBRILLATION, UNSPECIFIED TYPE (HCC): Primary | ICD-10-CM

## 2024-05-06 DIAGNOSIS — I48.0 PAF (PAROXYSMAL ATRIAL FIBRILLATION) (HCC): ICD-10-CM

## 2024-05-06 PROCEDURE — 95886 MUSC TEST DONE W/N TEST COMP: CPT | Performed by: PHYSICAL MEDICINE & REHABILITATION

## 2024-05-06 PROCEDURE — 95911 NRV CNDJ TEST 9-10 STUDIES: CPT | Performed by: PHYSICAL MEDICINE & REHABILITATION

## 2024-05-06 NOTE — PROGRESS NOTES
EMG REPORT     CHIEF COMPLAINT: Left neck, periscapular and arm pain with hand tingling.    HISTORY OF PRESENT ILLNESS: 83 y.o. right hand dominant female with recent worsening of a left neck and shoulder girdle pain that has been present for at least a year.  During a recent trip out of state, she had much more intense left neck pain and difficulty lying on her left side.  She was evaluated and treated with tender point injections.  She has numbness and tingling that radiates from her left shoulder towards her elbow and sometimes to her hand.  She struggles with sleep because of the symptoms but does not routinely drop things.  She has not noted any cramping or limb discoloration or rash.  She rated the pain severity as 10/10.  She has some similar pains in her right neck and shoulder.  She has no history of diabetes or any thyroid disorder.      PHYSICAL EXAMINATION: Alert.  Only about 50 degrees of active cervical spine rotation bilaterally.  Spurling's maneuver was uncomfortable but did not reproduce limb symptoms.  Upper limb reflexes were trace and symmetric.  Tinel sign was negative.  Thumb opposition MMT was 4/5 bilaterally.  No proximal weakness was noted.  There was some flattening of the thenar eminences bilaterally.  Perception of touch was mildly diminished over the thumb tips and long fingers of each hand.      NERVE CONDUCTION STUDIES:     MOTOR         LATENCY NORMAL AMPLITUDE DISTANCE COND. MAXIMILIANO.   RIGHT  MEDIAN 4.1 < 4.2 msec 2.8 8 cm 49   LEFT  MEDIAN 4.5 < 4.2 msec 2.9 8 cm 53   RIGHT  ULNAR 3.2 < 4.2 msec 4.6 8 cm >50   LEFT  ULNAR 3.0 < 4.2 msec 5.6 8 cm 64      SENSORY  ORTHODROMIC        LATENCY NORMAL AMPLITUDE DISTANCE   RIGHT MEDIAN 2.6 <2.3 msec 39 10 cm   LEFT  MEDIAN 3.0 < 2.3 msec 24 10 cm   RIGHT  ULNAR 2.0 < 2.3 msec 7 10 cm   LEFT  ULNAR 2.2 < 2.3 msec 8 10 cm       Left dorsal ulnar sensory: dL 2.4 msec, amp 9 microV.        NEEDLE EMG:      RIGHT   LEFT     Insertional Activity

## 2024-05-13 ENCOUNTER — OFFICE VISIT (OUTPATIENT)
Dept: CARDIOLOGY CLINIC | Age: 84
End: 2024-05-13
Payer: MEDICARE

## 2024-05-13 VITALS
DIASTOLIC BLOOD PRESSURE: 68 MMHG | SYSTOLIC BLOOD PRESSURE: 132 MMHG | BODY MASS INDEX: 37.13 KG/M2 | HEART RATE: 83 BPM | OXYGEN SATURATION: 97 % | HEIGHT: 62 IN | WEIGHT: 201.8 LBS

## 2024-05-13 DIAGNOSIS — E66.01 SEVERE OBESITY (BMI 35.0-39.9) WITH COMORBIDITY (HCC): ICD-10-CM

## 2024-05-13 DIAGNOSIS — I48.0 PAROXYSMAL ATRIAL FIBRILLATION (HCC): Primary | ICD-10-CM

## 2024-05-13 PROCEDURE — 3075F SYST BP GE 130 - 139MM HG: CPT | Performed by: INTERNAL MEDICINE

## 2024-05-13 PROCEDURE — 1090F PRES/ABSN URINE INCON ASSESS: CPT | Performed by: INTERNAL MEDICINE

## 2024-05-13 PROCEDURE — G8427 DOCREV CUR MEDS BY ELIG CLIN: HCPCS | Performed by: INTERNAL MEDICINE

## 2024-05-13 PROCEDURE — G8417 CALC BMI ABV UP PARAM F/U: HCPCS | Performed by: INTERNAL MEDICINE

## 2024-05-13 PROCEDURE — 1123F ACP DISCUSS/DSCN MKR DOCD: CPT | Performed by: INTERNAL MEDICINE

## 2024-05-13 PROCEDURE — G8400 PT W/DXA NO RESULTS DOC: HCPCS | Performed by: INTERNAL MEDICINE

## 2024-05-13 PROCEDURE — 99214 OFFICE O/P EST MOD 30 MIN: CPT | Performed by: INTERNAL MEDICINE

## 2024-05-13 PROCEDURE — 3078F DIAST BP <80 MM HG: CPT | Performed by: INTERNAL MEDICINE

## 2024-05-13 PROCEDURE — 1036F TOBACCO NON-USER: CPT | Performed by: INTERNAL MEDICINE

## 2024-05-13 RX ORDER — MONTELUKAST SODIUM 10 MG/1
10 TABLET ORAL DAILY
COMMUNITY

## 2024-05-13 NOTE — PATIENT INSTRUCTIONS
**It is YOUR responsibilty to bring medication bottles and/or updated medication list to EACH APPOINTMENT. This will allow us to better serve you and all your healthcare needs**   Thank you for allowing us to care for you today!   We want to ensure we can follow your treatment plan and we strive to give you the best outcomes and experience possible.   If you ever have a life threatening emergency and call 911 - for an ambulance (EMS)   Our providers can only care for you at:   [facility] or [facility].   Even if you have someone take you or you drive yourself we can only care for you in a [facility]. Our providers are not setup at the other healthcare locations!   Please be informed that if you contact our office outside of normal business hours the physician on call cannot help with any scheduling or rescheduling issues, procedure instruction questions or any type of medication issue.    We advise you for any urgent/emergency that you go to the nearest emergency room!    PLEASE CALL OUR OFFICE DURING NORMAL BUSINESS HOURS    Monday - Friday   8 am to 5 pm    [place]: [phone]    [place]: [phone]    [place]:  [phone]  We are committed to providing you the best care possible.    If you receive a survey after visiting one of our offices, please take time to share your experience concerning your physician office visit.  These surveys are confidential and no health information about you is shared.    We are eager to improve for you and we are counting on your feedback to help make that happen.

## 2024-05-13 NOTE — PROGRESS NOTES
Phuong Bloom MD        OFFICE  FOLLOWUP NOTE    Chief complaints: patient is here for management of shortness of breath, HTN, DYSLPIDEMIA, leg swelling, afib, H/O GI Bleeding, h/o afib ablation     Subjective: patient feels better, no chest pain, no shortness of breath, no dizziness, no palpitations    NIMA Tellez is a 83 y.o.year old who  has a past medical history of Asthma, Atrial fibrillation (HCC), Bronchitis, asthmatic, Chest pain, Fibromyalgia, H/O cardiac catheterization, H/O cardiovascular stress test, H/O cardiovascular stress test, H/O complete electrocardiogram, H/O Doppler lower venous ultrasound, H/O echocardiogram, H/O echocardiogram, History of cardiac monitoring, Hyperlipidemia, Hypertension, Lower extremity doppler, and Macular degeneration. and presents for management of shortness of breath, HTN, DYSLPIDEMIA, leg swelling, afib, H/O GI Bleeding, h/o afib ablation , which are well controlled      Current Outpatient Medications   Medication Sig Dispense Refill    montelukast (SINGULAIR) 10 MG tablet Take 1 tablet by mouth daily      potassium chloride (KLOR-CON M10) 10 MEQ extended release tablet Take 1 tablet by mouth daily 30 tablet 3    furosemide (LASIX) 20 MG tablet Take 1 tablet by mouth daily as needed (for edema) 90 tablet 1    dilTIAZem (TIAZAC) 180 MG extended release capsule 120 mg       Probiotic Product (PROBIOTIC PO) Take by mouth      Multiple Vitamins-Minerals (ICAPS AREDS 2 PO) Take by mouth 2 times daily      latanoprost (XALATAN) 0.005 % ophthalmic solution Apply to eye nightly      zinc gluconate 50 MG tablet Take 1 tablet by mouth daily      Cholecalciferol (VITAMIN D3) 50 MCG (2000 UT) CAPS Take by mouth      atorvastatin (LIPITOR) 10 MG tablet Take 1 tablet by mouth daily (Patient taking differently: Take 1 tablet by mouth daily 1/2 tab every day) 90 tablet 1    benzonatate (TESSALON PERLES) 100 MG capsule Take 1 capsule by mouth 3 times daily as needed for

## 2024-06-10 ENCOUNTER — TELEPHONE (OUTPATIENT)
Dept: CARDIOLOGY CLINIC | Age: 84
End: 2024-06-10

## 2024-06-10 NOTE — TELEPHONE ENCOUNTER
Echo     Left Ventricle: Normal left ventricular systolic function with a visually estimated EF of 55 - 60%. Left ventricle size is normal. Mildly increased wall thickness. Normal wall motion. Grade I diastolic dysfunction with normal LAP.    No significant valvular disease noted.    Tricuspid Valve: Mild regurgitation. The estimated RVSP is 27 mmHg.    Pericardium: No pericardial effusion.    Image quality is good.     Next appt 11/14/24 1p

## 2024-06-18 ENCOUNTER — PROCEDURE VISIT (OUTPATIENT)
Dept: CARDIOLOGY CLINIC | Age: 84
End: 2024-06-18

## 2024-06-18 DIAGNOSIS — Z45.09 ENCOUNTER FOR LOOP RECORDER CHECK: ICD-10-CM

## 2024-06-18 DIAGNOSIS — Z45.09 ENCOUNTER FOR ELECTRONIC ANALYSIS OF REVEAL EVENT RECORDER: ICD-10-CM

## 2024-06-18 DIAGNOSIS — I48.91 ATRIAL FIBRILLATION, UNSPECIFIED TYPE (HCC): Primary | ICD-10-CM

## 2024-06-18 DIAGNOSIS — I48.0 PAF (PAROXYSMAL ATRIAL FIBRILLATION) (HCC): ICD-10-CM

## 2024-08-02 PROCEDURE — 93298 REM INTERROG DEV EVAL SCRMS: CPT | Performed by: INTERNAL MEDICINE

## 2024-08-07 ENCOUNTER — PROCEDURE VISIT (OUTPATIENT)
Dept: CARDIOLOGY CLINIC | Age: 84
End: 2024-08-07
Payer: MEDICARE

## 2024-08-07 DIAGNOSIS — Z45.09 ENCOUNTER FOR LOOP RECORDER CHECK: ICD-10-CM

## 2024-08-07 DIAGNOSIS — Z45.09 ENCOUNTER FOR ELECTRONIC ANALYSIS OF REVEAL EVENT RECORDER: ICD-10-CM

## 2024-08-07 DIAGNOSIS — I48.91 ATRIAL FIBRILLATION, UNSPECIFIED TYPE (HCC): Primary | ICD-10-CM

## 2024-08-07 DIAGNOSIS — I48.0 PAF (PAROXYSMAL ATRIAL FIBRILLATION) (HCC): ICD-10-CM

## 2024-09-17 PROCEDURE — 93298 REM INTERROG DEV EVAL SCRMS: CPT | Performed by: INTERNAL MEDICINE

## 2024-10-21 PROCEDURE — 93298 REM INTERROG DEV EVAL SCRMS: CPT | Performed by: INTERNAL MEDICINE

## 2024-11-14 ENCOUNTER — OFFICE VISIT (OUTPATIENT)
Dept: CARDIOLOGY CLINIC | Age: 84
End: 2024-11-14

## 2024-11-14 VITALS
BODY MASS INDEX: 36.32 KG/M2 | DIASTOLIC BLOOD PRESSURE: 74 MMHG | HEART RATE: 87 BPM | SYSTOLIC BLOOD PRESSURE: 138 MMHG | WEIGHT: 205 LBS | HEIGHT: 63 IN

## 2024-11-14 DIAGNOSIS — I10 PRIMARY HYPERTENSION: ICD-10-CM

## 2024-11-14 DIAGNOSIS — Z86.79 S/P ABLATION OF ATRIAL FIBRILLATION: ICD-10-CM

## 2024-11-14 DIAGNOSIS — Z98.890 S/P ABLATION OF ATRIAL FIBRILLATION: ICD-10-CM

## 2024-11-14 DIAGNOSIS — E78.2 MIXED HYPERLIPIDEMIA: ICD-10-CM

## 2024-11-14 DIAGNOSIS — Z45.09 ENCOUNTER FOR ELECTRONIC ANALYSIS OF REVEAL EVENT RECORDER: ICD-10-CM

## 2024-11-14 DIAGNOSIS — E66.01 SEVERE OBESITY (BMI 35.0-39.9) WITH COMORBIDITY: ICD-10-CM

## 2024-11-14 DIAGNOSIS — I48.19 ATRIAL FIBRILLATION, PERSISTENT (HCC): ICD-10-CM

## 2024-11-14 DIAGNOSIS — I48.91 ATRIAL FIBRILLATION, UNSPECIFIED TYPE (HCC): ICD-10-CM

## 2024-11-14 DIAGNOSIS — K92.2 GASTROINTESTINAL HEMORRHAGE, UNSPECIFIED GASTROINTESTINAL HEMORRHAGE TYPE: ICD-10-CM

## 2024-11-14 DIAGNOSIS — Z95.818 STATUS POST PLACEMENT OF IMPLANTABLE LOOP RECORDER: ICD-10-CM

## 2024-11-14 DIAGNOSIS — I48.0 PAROXYSMAL ATRIAL FIBRILLATION (HCC): ICD-10-CM

## 2024-11-14 DIAGNOSIS — I48.0 PAF (PAROXYSMAL ATRIAL FIBRILLATION) (HCC): ICD-10-CM

## 2024-11-14 DIAGNOSIS — Z45.09 ENCOUNTER FOR LOOP RECORDER CHECK: Primary | ICD-10-CM

## 2024-11-14 RX ORDER — BENZONATATE 100 MG/1
100-200 CAPSULE ORAL 3 TIMES DAILY PRN
Qty: 60 CAPSULE | Refills: 0 | Status: SHIPPED | OUTPATIENT
Start: 2024-11-14 | End: 2024-11-21

## 2024-11-14 RX ORDER — BENZONATATE 100 MG/1
100 CAPSULE ORAL 3 TIMES DAILY PRN
Qty: 90 CAPSULE | Refills: 3 | Status: SHIPPED | OUTPATIENT
Start: 2024-11-14

## 2024-11-14 RX ORDER — MAGNESIUM GLUCONATE 30 MG(550)
TABLET ORAL
COMMUNITY

## 2024-11-14 NOTE — PROGRESS NOTES
MCG (2000 UT) CAPS Take by mouth      atorvastatin (LIPITOR) 10 MG tablet Take 1 tablet by mouth daily (Patient taking differently: Take 1 tablet by mouth daily 1 every 3 day) 90 tablet 1    benzonatate (TESSALON PERLES) 100 MG capsule Take 1 capsule by mouth 3 times daily as needed for Cough (Patient not taking: Reported on 11/14/2024) 90 capsule 3    aspirin 81 MG EC tablet aspirin   once daily (Patient not taking: Reported on 11/13/2023)      Compression Stockings MISC by Does not apply route 20 - 30 mmh wear daily and take off at night  Thigh High (Patient not taking: Reported on 11/13/2023) 1 each 1    Elastic Bandages & Supports (MEDICAL COMPRESSION STOCKINGS) MISC 1 each by Does not apply route daily (Patient not taking: Reported on 11/13/2023) 1 each 0    Probiotic Product (PROBIOTIC PO) Take by mouth (Patient not taking: Reported on 11/14/2024)      latanoprost (XALATAN) 0.005 % ophthalmic solution Apply to eye nightly (Patient not taking: Reported on 11/14/2024)      zinc gluconate 50 MG tablet Take 1 tablet by mouth daily (Patient not taking: Reported on 11/14/2024)       No current facility-administered medications for this visit.     Allergies: Latex, Amlodipine, Gentamicin, Torsemide, Cozaar [losartan], Amlodipine besylate, Ciprofloxacin, Codeine, Contrast [iodides], Iodine, Levaquin [levofloxacin], Losartan potassium, Macrodantin [nitrofurantoin macrocrystal], Metoprolol, Nutritional supplements, Penicillins, Wasp venom, Xarelto [rivaroxaban], and Zocor [simvastatin - high dose]  Past Medical History:   Diagnosis Date    Asthma     Atrial fibrillation (HCC) 2020    Bronchitis, asthmatic     Chest pain     Atypical    Fibromyalgia     H/O cardiac catheterization 08/02/1991    Patent coronary aterties with preserved left ventricular funciton.    H/O cardiovascular stress test     10/12 Normal EF70%,11/17/09 ([facility]) Normal pattern of perfusion in all regions. Post stress left venticle is normal in size.

## 2024-11-21 PROCEDURE — 93298 REM INTERROG DEV EVAL SCRMS: CPT | Performed by: INTERNAL MEDICINE

## 2025-03-03 ENCOUNTER — TELEPHONE (OUTPATIENT)
Dept: CARDIOLOGY CLINIC | Age: 85
End: 2025-03-03

## 2025-03-03 NOTE — TELEPHONE ENCOUNTER
Per Ivonne VENEGAS, I emailed Billing to refile DOS  2/8/2025 to Medicare- she got a new Medicare ID # Eff. 1/23/2025.

## 2025-03-27 PROCEDURE — 93298 REM INTERROG DEV EVAL SCRMS: CPT | Performed by: INTERNAL MEDICINE

## 2025-05-19 PROCEDURE — 93298 REM INTERROG DEV EVAL SCRMS: CPT | Performed by: INTERNAL MEDICINE

## 2025-05-28 ENCOUNTER — TELEPHONE (OUTPATIENT)
Dept: CARDIOLOGY CLINIC | Age: 85
End: 2025-05-28

## 2025-05-28 NOTE — TELEPHONE ENCOUNTER
----- Message from AMADOU SALAS MA sent at 5/22/2025  3:21 PM EDT -----  Per Dr. Meyer. On last link report pt having 1.8% burden of afib/flutter and needs OV to discuss possible watchman. Message to EP to schedule.

## 2025-06-03 ENCOUNTER — OFFICE VISIT (OUTPATIENT)
Dept: CARDIOLOGY CLINIC | Age: 85
End: 2025-06-03
Payer: MEDICARE

## 2025-06-03 VITALS
HEIGHT: 63 IN | HEART RATE: 87 BPM | SYSTOLIC BLOOD PRESSURE: 138 MMHG | DIASTOLIC BLOOD PRESSURE: 80 MMHG | WEIGHT: 194.2 LBS | BODY MASS INDEX: 34.41 KG/M2

## 2025-06-03 DIAGNOSIS — I48.19 ATRIAL FIBRILLATION, PERSISTENT (HCC): ICD-10-CM

## 2025-06-03 DIAGNOSIS — I48.91 ATRIAL FIBRILLATION, UNSPECIFIED TYPE (HCC): Primary | ICD-10-CM

## 2025-06-03 DIAGNOSIS — E66.01 SEVERE OBESITY (BMI 35.0-39.9) WITH COMORBIDITY (HCC): ICD-10-CM

## 2025-06-03 DIAGNOSIS — I10 PRIMARY HYPERTENSION: ICD-10-CM

## 2025-06-03 DIAGNOSIS — Z45.09 ENCOUNTER FOR ELECTRONIC ANALYSIS OF REVEAL EVENT RECORDER: ICD-10-CM

## 2025-06-03 DIAGNOSIS — I48.0 PAF (PAROXYSMAL ATRIAL FIBRILLATION) (HCC): ICD-10-CM

## 2025-06-03 DIAGNOSIS — Z45.09 ENCOUNTER FOR LOOP RECORDER CHECK: ICD-10-CM

## 2025-06-03 DIAGNOSIS — I48.0 PAROXYSMAL ATRIAL FIBRILLATION (HCC): ICD-10-CM

## 2025-06-03 DIAGNOSIS — Z95.818 STATUS POST PLACEMENT OF IMPLANTABLE LOOP RECORDER: ICD-10-CM

## 2025-06-03 PROCEDURE — 3079F DIAST BP 80-89 MM HG: CPT | Performed by: INTERNAL MEDICINE

## 2025-06-03 PROCEDURE — 1036F TOBACCO NON-USER: CPT | Performed by: INTERNAL MEDICINE

## 2025-06-03 PROCEDURE — 3075F SYST BP GE 130 - 139MM HG: CPT | Performed by: INTERNAL MEDICINE

## 2025-06-03 PROCEDURE — G8417 CALC BMI ABV UP PARAM F/U: HCPCS | Performed by: INTERNAL MEDICINE

## 2025-06-03 PROCEDURE — 99214 OFFICE O/P EST MOD 30 MIN: CPT | Performed by: INTERNAL MEDICINE

## 2025-06-03 PROCEDURE — G8400 PT W/DXA NO RESULTS DOC: HCPCS | Performed by: INTERNAL MEDICINE

## 2025-06-03 PROCEDURE — G8427 DOCREV CUR MEDS BY ELIG CLIN: HCPCS | Performed by: INTERNAL MEDICINE

## 2025-06-03 PROCEDURE — 1159F MED LIST DOCD IN RCRD: CPT | Performed by: INTERNAL MEDICINE

## 2025-06-03 PROCEDURE — 1090F PRES/ABSN URINE INCON ASSESS: CPT | Performed by: INTERNAL MEDICINE

## 2025-06-03 PROCEDURE — 1123F ACP DISCUSS/DSCN MKR DOCD: CPT | Performed by: INTERNAL MEDICINE

## 2025-06-03 NOTE — PATIENT INSTRUCTIONS
Thank you for allowing us to care for you today!   We want to ensure we can follow your treatment plan and we strive to give you the best outcomes and experience possible.   If you ever have a life threatening emergency and call 911 - for an ambulance (EMS)  REMEMBER  Our providers can only care for you at:   [facility] or [facility]   Even if you have someone take you or you drive yourself we can only care for you in a [facility] facility. Our providers are not setup at the other healthcare locations!    PLEASE CALL OUR OFFICE DURING NORMAL BUSINESS HOURS  Monday through Friday 8 am to 5 pm  AFTER HOURS the physician on-call cannot help with scheduling, rescheduling, procedure instruction questions or any type of medication need or issue.  [facility] P:[phone] - [facility] P:[phone] - [facility] P:[phone]      If you receive a survey:  We would appreciate you taking the time to share your experience concerning your provider visit in the office.    These surveys are confidential!  We are eager to improve and are counting on you to share your feedback so we can ensure you get the best care possible.

## 2025-06-03 NOTE — PROGRESS NOTES
CLINICAL STAFF DOCUMENTATION    Dr. Phuong Martin  1940  [id number]    Have you had any Chest Pain recently? - No    Have you had any Shortness of Breath - No    Have you had any dizziness - No    Have you had any palpitations recently? - No    Any thyroid issues? - No    Do you have any edema - swelling in No      When did you have your last labs drawn 10/2022  What doctor ordered PCP  Do we have the labs in their chart Yes    Do you need any prescriptions refilled? - No    Do you have a surgery or procedure scheduled in the near future - No    Do use tobacco products? - No  Do you drink alcohol? - No  Do you use any illicit drugs? - No  Caffeine? - No    Check medication list thoroughly!!! AND RECONCILE OUTSIDE MEDICATIONS  If dose has changed change the entire order not just the MG  BE SURE TO ASK PATIENT IF THEY NEED MEDICATION REFILLS  Verify Pharmacy and update if incorrect  Add to every patient's \"wrap up\" the following dot phrase AFTERVISITCARDIOHEARTHOUSE and ensure we explain this to our patients

## 2025-06-03 NOTE — PROGRESS NOTES
Phuong Bloom MD        OFFICE  FOLLOWUP NOTE    Chief complaints: patient is here for management of  shortness of breath, HTN, DYSLPIDEMIA, leg swelling, afib, H/O GI Bleeding, h/o afib ablation     Subjective: patient feels better, no chest pain, no shortness of breath, no dizziness, no palpitations    NIMA Tellez is a 84 y.o.year old who  has a past medical history of Asthma, Atrial fibrillation (HCC), Bronchitis, asthmatic, Chest pain, Fibromyalgia, H/O cardiac catheterization, H/O cardiovascular stress test, H/O cardiovascular stress test, H/O complete electrocardiogram, H/O Doppler lower venous ultrasound, H/O echocardiogram, H/O echocardiogram, History of cardiac monitoring, Hyperlipidemia, Hypertension, Lower extremity doppler, and Macular degeneration. and presents for management of  shortness of breath, HTN, DYSLPIDEMIA, leg swelling, afib, H/O GI Bleeding, h/o afib ablation  shortness of breath, HTN, DYSLPIDEMIA, leg swelling, afib, H/O GI Bleeding, h/o afib ablation which are well controlled      Current Outpatient Medications   Medication Sig Dispense Refill    Potassium Gluconate 595 (99 K) MG TABS Take by mouth      Netarsudil-Latanoprost (ROCKLATAN OP) Apply to eye      albuterol sulfate (PROAIR RESPICLICK) 108 (90 Base) MCG/ACT aerosol powder inhalation Inhale into the lungs every 4 hours as needed for Wheezing or Shortness of Breath      benzonatate (TESSALON PERLES) 100 MG capsule Take 1 capsule by mouth 3 times daily as needed for Cough 90 capsule 3    montelukast (SINGULAIR) 10 MG tablet Take 1 tablet by mouth daily      potassium chloride (KLOR-CON M10) 10 MEQ extended release tablet Take 1 tablet by mouth daily 30 tablet 3    furosemide (LASIX) 20 MG tablet Take 1 tablet by mouth daily as needed (for edema) 90 tablet 1    Compression Stockings MISC by Does not apply route 20 - 30 mmh wear daily and take off at night  Thigh High 1 each 0    Ascorbic Acid (VITAMIN C) 250 MG

## 2025-06-06 ENCOUNTER — OFFICE VISIT (OUTPATIENT)
Age: 85
End: 2025-06-06

## 2025-06-06 VITALS
DIASTOLIC BLOOD PRESSURE: 82 MMHG | HEIGHT: 63 IN | HEART RATE: 83 BPM | WEIGHT: 193 LBS | BODY MASS INDEX: 34.2 KG/M2 | SYSTOLIC BLOOD PRESSURE: 140 MMHG

## 2025-06-06 DIAGNOSIS — I48.19 ATRIAL FIBRILLATION, PERSISTENT (HCC): ICD-10-CM

## 2025-06-06 DIAGNOSIS — I10 PRIMARY HYPERTENSION: ICD-10-CM

## 2025-06-06 DIAGNOSIS — I48.0 PAF (PAROXYSMAL ATRIAL FIBRILLATION) (HCC): Primary | ICD-10-CM
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
## 2025-06-11 ENCOUNTER — TELEPHONE (OUTPATIENT)
Age: 85
End: 2025-06-11

## 2025-06-11 NOTE — TELEPHONE ENCOUNTER
Saw patient with Dr Meyer on Friday 6/6/2025.  My role as Watchman Coordinator explained.   Valente discussed, Brochure provided and [employer] video shared.   Patient states she is unsure if she would like to proceed. States not sure she ants another procedure.  Will further educate if she decided to proceed.  Will be available.  Electronically signed by Ivy Ren RN on 6/11/2025 at 8:14 AM [facility] Care Coordinator

## 2025-06-23 PROCEDURE — 93298 REM INTERROG DEV EVAL SCRMS: CPT | Performed by: INTERNAL MEDICINE

## 2025-08-20 ENCOUNTER — OFFICE VISIT (OUTPATIENT)
Age: 85
End: 2025-08-20
Payer: MEDICARE

## 2025-08-20 VITALS
SYSTOLIC BLOOD PRESSURE: 142 MMHG | BODY MASS INDEX: 34.55 KG/M2 | WEIGHT: 195 LBS | DIASTOLIC BLOOD PRESSURE: 100 MMHG | HEIGHT: 63 IN

## 2025-08-20 DIAGNOSIS — Z01.419 ENCOUNTER FOR GYNECOLOGICAL EXAMINATION WITHOUT ABNORMAL FINDING: ICD-10-CM

## 2025-08-20 DIAGNOSIS — Z12.31 ENCOUNTER FOR SCREENING MAMMOGRAM FOR MALIGNANT NEOPLASM OF BREAST: Primary | ICD-10-CM

## 2025-08-20 PROCEDURE — G8427 DOCREV CUR MEDS BY ELIG CLIN: HCPCS | Performed by: OBSTETRICS & GYNECOLOGY

## 2025-08-20 PROCEDURE — G8417 CALC BMI ABV UP PARAM F/U: HCPCS | Performed by: OBSTETRICS & GYNECOLOGY

## 2025-08-20 PROCEDURE — G0101 CA SCREEN;PELVIC/BREAST EXAM: HCPCS | Performed by: OBSTETRICS & GYNECOLOGY

## 2025-08-20 ASSESSMENT — ENCOUNTER SYMPTOMS
SHORTNESS OF BREATH: 0
ABDOMINAL PAIN: 0